# Patient Record
Sex: FEMALE | Race: WHITE | NOT HISPANIC OR LATINO | Employment: FULL TIME | URBAN - METROPOLITAN AREA
[De-identification: names, ages, dates, MRNs, and addresses within clinical notes are randomized per-mention and may not be internally consistent; named-entity substitution may affect disease eponyms.]

---

## 2018-05-17 ENCOUNTER — OFFICE VISIT (OUTPATIENT)
Dept: HEMATOLOGY/ONCOLOGY | Facility: CLINIC | Age: 23
End: 2018-05-17
Payer: MEDICAID

## 2018-05-17 VITALS
HEIGHT: 67 IN | DIASTOLIC BLOOD PRESSURE: 76 MMHG | HEART RATE: 81 BPM | TEMPERATURE: 99 F | WEIGHT: 189.13 LBS | BODY MASS INDEX: 29.69 KG/M2 | SYSTOLIC BLOOD PRESSURE: 123 MMHG

## 2018-05-17 DIAGNOSIS — D50.9 IRON DEFICIENCY ANEMIA, UNSPECIFIED IRON DEFICIENCY ANEMIA TYPE: ICD-10-CM

## 2018-05-17 DIAGNOSIS — E61.1 IRON DEFICIENCY: ICD-10-CM

## 2018-05-17 PROCEDURE — 99203 OFFICE O/P NEW LOW 30 MIN: CPT | Mod: ,,, | Performed by: INTERNAL MEDICINE

## 2018-05-17 RX ORDER — METHYLPREDNISOLONE 4 MG/1
TABLET ORAL
Refills: 0 | COMMUNITY
Start: 2018-05-15 | End: 2018-10-06

## 2018-05-17 RX ORDER — IRON,CARBONYL/ASCORBIC ACID 100-250 MG
1 TABLET ORAL DAILY
Qty: 30 EACH | Refills: 6 | Status: SHIPPED | OUTPATIENT
Start: 2018-05-17 | End: 2020-04-06 | Stop reason: SDUPTHER

## 2018-05-17 NOTE — PROGRESS NOTES
[facility] Hematolgy/Oncology  History & Physical    Subjective:      Patient ID:   NAME: Mena Nichols-Ponthieux : 1995     22 y.o. female    Referring Doc: Diana Parker  Other Physicians: Guicho        Chief Complaint: iron defic      HPI:  22 y.o. female with diagnosis of iron deficiency anemia who has been referred by Yaz for evaluation by medical hematology/oncology. She recently had a baby about 8 months ago. She has had iron issues for several years. She has had anemia issues with both pregnancies. She has some vertigo issues and is on a steroid back. She has fatigue and occasional nausea. Occasional light headedness. No CO, SOB, V. Occasional HA's.              ROS:   GEN: normal without any fever, night sweats or weight loss  HEENT: normal with no HA's, sore throat, stiff neck, changes in vision  CV: normal with no CP, SOB, PND, EDDY or orthopnea  PULM: normal with no SOB, cough, hemoptysis, sputum or pleuritic pain  GI: normal with no abdominal pain, nausea, vomiting, constipation, diarrhea, melanotic stools, BRBPR, or hematemesis  : normal with no hematuria, dysuria  BREAST: normal with no mass, discharge, pain  SKIN: normal with no rash, erythema, bruising, or swelling       Past Medical/Surgical History:  Past Medical History:   Diagnosis Date    Iron deficiency 2018    Iron deficiency anemia 2018     History reviewed. No pertinent surgical history.      Allergies:  Review of patient's allergies indicates:  No Known Allergies    Social/Family History:  Social History     Social History    Marital status: Single     Spouse name: N/A    Number of children: N/A    Years of education: N/A     Occupational History    Not on file.     Social History Main Topics    Smoking status: Never Smoker    Smokeless tobacco: Not on file    Alcohol use No    Drug use: No    Sexual activity: Not Currently     Partners: Male     Other Topics Concern    Not on file     Social  "History Narrative    No narrative on file     History reviewed. No pertinent family history.      Medications:    Current Outpatient Prescriptions:     methylPREDNISolone (MEDROL DOSEPACK) 4 mg tablet, TK UTD, Disp: , Rfl: 0      Pathology:  Cancer Staging  No matching staging information was found for the patient.      Objective:   Vitals:  Blood pressure 123/76, pulse 81, temperature 99.1 °F (37.3 °C), height 5' 7" (1.702 m), weight 85.8 kg (189 lb 1.6 oz).    Physical Examination:   GEN: no apparent distress, comfortable; AAOx3  HEAD: atraumatic and normocephalic  EYES: no pallor, no icterus, PERRLA  ENT: OMM, no pharyngeal erythema, external ears WNL; no nasal discharge; no thrush  NECK: no masses, thyroid normal, trachea midline, no LAD/LN's, supple  CV: RRR with no murmur; normal pulse; normal S1 and S2; no pedal edema  CHEST: Normal respiratory effort; CTAB; normal breath sounds; no wheeze or crackles  ABDOM: nontender and nondistended; soft; normal bowel sounds; no rebound/guarding  MUSC/Skeletal: ROM normal; no crepitus; joints normal; no deformities or arthropathy  EXTREM: no clubbing, cyanosis, inflammation or swelling  SKIN: no rashes, lesions, ulcers, petechiae or subcutaneous nodules  : no guevara  NEURO: grossly intact; motor/sensory WNL; AAOx3; no tremors  PSYCH: normal mood, affect and behavior  LYMPH: normal cervical, supraclavicular, axillary and groin LN's      Labs:   5/15/2018  On chart        Radiology/Diagnostic Studies:          All lab results and imaging results have been reviewed and discussed with the patient    Assessment:   (1)  22 y.o. female with diagnosis of iron deficiency anemia who has been referred by Yaz for evaluation by medical hematology/oncology. She recently had a baby about 8 months ago.   - her hgb is currently wnl at 12.1  - MCV is normal at 84.3  - iron was wnl at 65 and TIBC was normal at 348  - ferritin was low at 12    (2) Vertigo - s/p current " prednisone does pack                Plan:       Iron deficiency anemia, unspecified iron deficiency anemia type    Iron deficiency            PLAN:  1. Try ICAR-C oral iron supplement first  2. Check labs months  3. F/u with PCP and GYN  4. RTC in  3 months  Fax note to Diana Parker NP, Guicho        I have explained and the patient understands all of  the current recommendation(s). I have answered all of their questions to the best of my ability and to their complete satisfaction.             Thank you for allowing me to participate in this patient's care. Please call with any questions or concerns.    Electronically signed Kaz Saldana MD

## 2018-05-17 NOTE — LETTER
May 17, 2018      Diana Parker NP  [address] [address]  [address]  [place] [place] [address]           [facility] Hematology Oncology  [address]  [address]  [place] [address]  Phone: [phone]  Fax: [phone]          Patient: Mena Rascon   MR Number: [id number]   YOB: 1995   Date of Visit: 5/17/2018       Dear Diana Parker:    Thank you for referring Mena Rascon to me for evaluation. Attached you will find relevant portions of my assessment and plan of care.    If you have questions, please do not hesitate to call me. I look forward to following Mena Rascon along with you.    Sincerely,    Kaz Saldana MD    Enclosure  CC:  No Recipients    If you would like to receive this communication electronically, please contact [email or web address] or [phone] to request more information on [facility] Link access.    For providers and/or their staff who would like to refer a patient to [facility], please contact us through our one-stop-shop provider referral line, [facility], at [phone].    If you feel you have received this communication in error or would no longer like to receive these types of communications, please e-mail [email or web address]

## 2018-08-21 ENCOUNTER — OFFICE VISIT (OUTPATIENT)
Dept: HEMATOLOGY/ONCOLOGY | Facility: CLINIC | Age: 23
End: 2018-08-21
Payer: MEDICAID

## 2018-08-21 VITALS
WEIGHT: 190.69 LBS | RESPIRATION RATE: 18 BRPM | SYSTOLIC BLOOD PRESSURE: 118 MMHG | DIASTOLIC BLOOD PRESSURE: 82 MMHG | HEART RATE: 86 BPM | TEMPERATURE: 98 F | BODY MASS INDEX: 29.87 KG/M2

## 2018-08-21 DIAGNOSIS — E61.1 IRON DEFICIENCY: Primary | ICD-10-CM

## 2018-08-21 LAB
% SATURATION: 11 %
ALBUMIN SERPL-MCNC: 4.3 G/DL (ref 3.1–4.7)
ALP SERPL-CCNC: 72 IU/L (ref 40–104)
ALT (SGPT): 13 IU/L (ref 3–33)
AST SERPL-CCNC: 17 IU/L (ref 10–40)
BILIRUB SERPL-MCNC: 0.9 MG/DL (ref 0.3–1)
BUN SERPL-MCNC: 8 MG/DL (ref 8–20)
CALCIUM SERPL-MCNC: 8.9 MG/DL (ref 7.7–10.4)
CHLORIDE: 101 MMOL/L (ref 98–110)
CO2 SERPL-SCNC: 23.8 MMOL/L (ref 22.8–31.6)
CREATININE: 0.76 MG/DL (ref 0.6–1.4)
FERRITIN SERPL-MCNC: 48 NG/ML (ref 24–162)
GLUCOSE: 116 MG/DL (ref 70–99)
IRON: 34 MCG/DL (ref 24–162)
POTASSIUM SERPL-SCNC: 3.4 MMOL/L (ref 3.5–5)
PROT SERPL-MCNC: 8 G/DL (ref 6–8.2)
SODIUM: 135 MMOL/L (ref 134–144)
TOTAL IRON BINDING CAPACITY: 311 MCG/DL (ref 177–435)

## 2018-08-21 PROCEDURE — 99214 OFFICE O/P EST MOD 30 MIN: CPT | Mod: ,,, | Performed by: INTERNAL MEDICINE

## 2018-08-21 NOTE — LETTER
August 21, 2018      Diana Parker NP  [address] [address]  [address]  [facility]  [place] [address]           [facility] - Hematology Oncology  [address]  [address]  [facility] [address]  Phone: [phone]  Fax: [phone]          Patient: Mena Rascon   MR Number: [id number]   YOB: 1995   Date of Visit: 8/21/2018       Dear Diana Parker:    Thank you for referring Mena Rascon to me for evaluation. Attached you will find relevant portions of my assessment and plan of care.    If you have questions, please do not hesitate to call me. I look forward to following Mena Rascon along with you.    Sincerely,    Kaz Saldana MD    Enclosure  CC:  No Recipients    If you would like to receive this communication electronically, please contact [email or web address] or [phone] to request more information on [facility] Link access.    For providers and/or their staff who would like to refer a patient to [facility], please contact us through our one-stop-shop provider referral line, [facility], at [phone].    If you feel you have received this communication in error or would no longer like to receive these types of communications, please e-mail [email or web address]

## 2018-08-21 NOTE — PROGRESS NOTES
[facility] Hematology/Oncology  PROGRESS NOTE - 2nd Follow-up Visit      Subjective:       Patient ID:   NAME: Mena Nichols-Tonythialexander : 1995     22 y.o. female    Referring Doc: Diana Parker  Other Physicians: Guicho    Chief Complaint:  Iron defic anemia f/u    History of Present Illness:     Patient returns today for a 2nd regularly scheduled follow-up visit.  The patient is here today to go over the results of the recently ordered labs, tests and studies. She had repeat labs this am. She is here by herself. She denies any CP, SOB, HA's or N/V. Some mild upper respiratory issues.             ROS:   GEN: normal without any fever, night sweats or weight loss  HEENT: normal with no HA's, sore throat, stiff neck, changes in vision  CV: normal with no CP, SOB, PND, EDDY or orthopnea  PULM: normal with no SOB, cough, hemoptysis, sputum or pleuritic pain  GI: normal with no abdominal pain, nausea, vomiting, constipation, diarrhea, melanotic stools, BRBPR, or hematemesis  : normal with no hematuria, dysuria  BREAST: normal with no mass, discharge, pain  SKIN: normal with no rash, erythema, bruising, or swelling    Allergies:  Review of patient's allergies indicates:  No Known Allergies    Medications:    Current Outpatient Medications:     iron-vitamin C 100-250 mg, ICAR-C, (ICAR-C) 100-250 mg Tab, Take 1 tablet by mouth once daily., Disp: 30 each, Rfl: 6    methylPREDNISolone (MEDROL DOSEPACK) 4 mg tablet, TK UTD, Disp: , Rfl: 0    PMHx/PSHx Updates:  See patient's last visit with me on 2018.  See H&P on 2018        Pathology:  Cancer Staging  No matching staging information was found for the patient.          Objective:     Vitals:  Blood pressure 118/82, pulse 86, temperature 98.1 °F (36.7 °C), resp. rate 18, weight 86.5 kg (190 lb 11.2 oz).    Physical Examination:   GEN: no apparent distress, comfortable; AAOx3  HEAD: atraumatic and normocephalic  EYES: no pallor, no icterus, PERRLA  ENT: OMM, no  pharyngeal erythema, external ears WNL; no nasal discharge; no thrush  NECK: no masses, thyroid normal, trachea midline, no LAD/LN's, supple  CV: RRR with no murmur; normal pulse; normal S1 and S2; no pedal edema  CHEST: Normal respiratory effort; CTAB; normal breath sounds; no wheeze or crackles  ABDOM: nontender and nondistended; soft; normal bowel sounds; no rebound/guarding  MUSC/Skeletal: ROM normal; no crepitus; joints normal; no deformities or arthropathy  EXTREM: no clubbing, cyanosis, inflammation or swelling  SKIN: no rashes, lesions, ulcers, petechiae or subcutaneous nodules  : no guevara  NEURO: grossly intact; motor/sensory WNL; AAOx3; no tremors  PSYCH: normal mood, affect and behavior  LYMPH: normal cervical, supraclavicular, axillary and groin LN's            Labs:     8/3/2018  Lab Results   Component Value Date    WBC 6.0 08/21/2018    HGB 13.2 08/21/2018    HCT 39.8 08/21/2018     08/21/2018       Iron 34 and ferritin 48    Radiology/Diagnostic Studies:    No results found.    I have reviewed all available lab results and radiology reports.    Assessment/Plan:   (1) 22 y.o. female with diagnosis of iron deficiency anemia who has been referred by Yaz for evaluation by medical hematology/oncology. She recently had a baby about 8 months ago.   - her hgb is currently wnl at 13.2 and better  - MCV is normal at 86.7  - iron and ferritin are good now     (2) Vertigo - s/p prednisone in past            Iron deficiency          PLAN:  1. Check labs every three months  2. Continue oral iron for now  3. F/u with PCP and GYN  4. RTC in 6 months  Fax note to , Diana Parker NP, [place]    Discussion:       I spent over 25 mins of time with the patient. Reviewed results of the recently ordered labs, tests and studies; made directives with regards to the results. Over half of this time was spent couseling and coordinating care.    I have explained all of the above in detail and the  patient understands all of the current recommendation(s). I have answered all of their questions to the best of my ability and to their complete satisfaction.   The patient is to continue with the current management plan.            Electronically signed by Kaz Saldana MD

## 2018-10-06 ENCOUNTER — HOSPITAL ENCOUNTER (EMERGENCY)
Facility: HOSPITAL | Age: 23
Discharge: HOME | End: 2018-10-06
Attending: EMERGENCY MEDICINE
Payer: MEDICAID

## 2018-10-06 VITALS
SYSTOLIC BLOOD PRESSURE: 110 MMHG | BODY MASS INDEX: 28.25 KG/M2 | HEART RATE: 81 BPM | TEMPERATURE: 98 F | OXYGEN SATURATION: 98 % | RESPIRATION RATE: 18 BRPM | HEIGHT: 67 IN | DIASTOLIC BLOOD PRESSURE: 80 MMHG | WEIGHT: 180 LBS

## 2018-10-06 DIAGNOSIS — J02.9 VIRAL PHARYNGITIS: Primary | ICD-10-CM

## 2018-10-06 LAB
B-HCG UR QL: NEGATIVE
CTP QC/QA: YES
DEPRECATED S PYO AG THROAT QL EIA: NEGATIVE

## 2018-10-06 PROCEDURE — 87081 CULTURE SCREEN ONLY: CPT

## 2018-10-06 PROCEDURE — 81025 URINE PREGNANCY TEST: CPT | Performed by: EMERGENCY MEDICINE

## 2018-10-06 PROCEDURE — 87880 STREP A ASSAY W/OPTIC: CPT

## 2018-10-06 PROCEDURE — 99282 EMERGENCY DEPT VISIT SF MDM: CPT

## 2018-10-06 NOTE — ED PROVIDER NOTES
"Encounter Date: 10/6/2018    SCRIBE #1 NOTE: I, Michelle Hernandez, am scribing for, and in the presence of, Dr. Colorado .       History     Chief Complaint   Patient presents with    Sore Throat       Time seen by provider: 7:05 AM on 10/06/2018    Mena Ovalle is a 23 y.o. female with a PMHx of amenia who presents to the ED with complaints of sore throat and a productive cough with an onset x 1 day PTA. The patient relays that she has been coughing up mucous and had a sore throat since last night. She is concerned that she may have strep throat. She states that would not be able to see her PCP for a while and wanted to have a strep test done as soon as possible. Patient relays that she has been "kissing on her kids" a lot and wants to make sure that she did not give them strep throat. She denies any relief with the "allergy medication" she took last night. The patient denies fever, nausea, vomiting, or any other symptoms at this time. No pertinent SHx noted.         The history is provided by the patient.     Review of patient's allergies indicates:  No Known Allergies  Past Medical History:   Diagnosis Date    Iron deficiency 5/17/2018    Iron deficiency     Iron deficiency anemia 5/17/2018     History reviewed. No pertinent surgical history.  Family History   Problem Relation Age of Onset    Breast cancer Maternal Grandmother     Lymphoma Maternal Grandmother     Cervical cancer Maternal Grandmother      Social History     Tobacco Use    Smoking status: Never Smoker    Smokeless tobacco: Never Used   Substance Use Topics    Alcohol use: No    Drug use: No     Review of Systems   Constitutional: Negative for activity change, appetite change, chills, fatigue and fever.   HENT: Positive for sore throat. Negative for congestion, ear pain and rhinorrhea.    Eyes: Negative for visual disturbance.   Respiratory: Positive for cough. Negative for apnea and shortness of breath.    Cardiovascular: Negative for " chest pain and palpitations.   Gastrointestinal: Negative for abdominal distention, abdominal pain, diarrhea, nausea and vomiting.   Genitourinary: Negative for difficulty urinating.   Musculoskeletal: Negative for neck pain.   Skin: Negative for pallor and rash.   Neurological: Negative for headaches.   Hematological: Does not bruise/bleed easily.   Psychiatric/Behavioral: Negative for agitation.       Physical Exam     Initial Vitals [10/06/18 0701]   BP Pulse Resp Temp SpO2   110/80 81 18 98.2 °F (36.8 °C) 98 %      MAP       --         Physical Exam    Nursing note and vitals reviewed.  Constitutional: She appears well-developed and well-nourished. She is not diaphoretic. No distress.   HENT:   Head: Normocephalic and atraumatic.   Mouth/Throat: Uvula is midline and mucous membranes are normal. Posterior oropharyngeal erythema (mild ) present. No oropharyngeal exudate, posterior oropharyngeal edema or tonsillar abscesses.   Eyes: EOM are normal. Pupils are equal, round, and reactive to light.   Neck: Normal range of motion. Neck supple.   Cardiovascular: Normal rate, regular rhythm, normal heart sounds and intact distal pulses. Exam reveals no gallop and no friction rub.    No murmur heard.  Pulmonary/Chest: Breath sounds normal. No respiratory distress. She has no wheezes. She has no rhonchi. She has no rales.   Abdominal: Soft. Bowel sounds are normal. There is no tenderness. There is no rebound and no guarding.   Musculoskeletal: Normal range of motion.   Neurological: She is alert and oriented to person, place, and time.   Skin: Skin is warm and dry.   Psychiatric: She has a normal mood and affect. Her behavior is normal. Judgment and thought content normal.         ED Course   Procedures  Labs Reviewed   THROAT SCREEN, RAPID   CULTURE, STREP A,  THROAT   POCT URINE PREGNANCY          Imaging Results    None          Medical Decision Making:   History:   Old Medical Records: I decided to obtain old medical  records.  Initial Assessment:   23-year-old female presented with a chief complaint of a sore throat.  Differential Diagnosis:   Initial differential diagnosis included but not limited to peritonsillar abscess, pharyngitis, upper respiratory infection.  Clinical Tests:   Lab Tests: Ordered and Reviewed  ED Management:  The patient was urgently evaluated in the emergency department, her evaluation was significant for a young female mild redness in the back of her throat.  The patient's rapid strep screen was noted to be negative.  The patient likely has a pharyngitis from an upper respiratory infection.  She is stable for discharge to home.  She is to take over-the-counter medications as needed for symptomatic relief and she is to follow her PCP for further care.            Scribe Attestation:   Scribe #1: I performed the above scribed service and the documentation accurately describes the services I performed. I attest to the accuracy of the note.           I, Dr. Washington Colorado, personally performed the services described in this documentation. All medical record entries made by the scribe were at my direction and in my presence.  I have reviewed the chart and agree that the record reflects my personal performance and is accurate and complete. Washington Colorado MD.  7:34 AM 10/06/2018       Clinical Impression:   The encounter diagnosis was Viral pharyngitis.      Disposition:   Disposition: Discharged  Condition: Stable                        Washington Colorado MD  10/06/18 0736

## 2018-10-08 LAB — BACTERIA THROAT CULT: NORMAL

## 2018-12-13 DIAGNOSIS — Z83.49 FAMILY HISTORY OF MTHFR DEFICIENCY: Primary | ICD-10-CM

## 2019-03-04 ENCOUNTER — HOSPITAL ENCOUNTER (EMERGENCY)
Facility: HOSPITAL | Age: 24
Discharge: HOME | End: 2019-03-04
Attending: EMERGENCY MEDICINE
Payer: MEDICAID

## 2019-03-04 VITALS
HEIGHT: 67 IN | RESPIRATION RATE: 16 BRPM | SYSTOLIC BLOOD PRESSURE: 139 MMHG | HEART RATE: 89 BPM | DIASTOLIC BLOOD PRESSURE: 84 MMHG | TEMPERATURE: 99 F | OXYGEN SATURATION: 98 % | WEIGHT: 190 LBS | BODY MASS INDEX: 29.82 KG/M2

## 2019-03-04 DIAGNOSIS — L08.9 INFECTED BITE WOUND OF FINGER, INITIAL ENCOUNTER: Primary | ICD-10-CM

## 2019-03-04 DIAGNOSIS — S61.259A INFECTED BITE WOUND OF FINGER, INITIAL ENCOUNTER: Primary | ICD-10-CM

## 2019-03-04 PROCEDURE — 99283 EMERGENCY DEPT VISIT LOW MDM: CPT

## 2019-03-04 PROCEDURE — 25000003 PHARM REV CODE 250: Performed by: EMERGENCY MEDICINE

## 2019-03-04 RX ORDER — MUPIROCIN 20 MG/G
1 OINTMENT TOPICAL
Status: COMPLETED | OUTPATIENT
Start: 2019-03-04 | End: 2019-03-04

## 2019-03-04 RX ORDER — AMOXICILLIN 500 MG/1
500 CAPSULE ORAL
COMMUNITY
End: 2019-03-06

## 2019-03-04 RX ADMIN — MUPIROCIN 22 G: 20 OINTMENT TOPICAL at 08:03

## 2019-03-04 NOTE — ED PROVIDER NOTES
"Encounter Date: 3/4/2019    SCRIBE #1 NOTE: I, Val Eller, am scribing for, and in the presence of, Washington Colorado MD.       History     Chief Complaint   Patient presents with    Wound Check     right finger       Time seen by provider: 8:43 AM on 03/04/2019    Mena Ovalle is a 23 y.o. female who presents to the ED with an onset of an area of swelling on her right third finger starting 4 days ago. She states the wound started as a "pimple" and then became swollen and itchy this morning. The patient has been using Bactroban. She denies history of diabetes, fever, or any other symptoms at this time. Patient has a history of anemia. No pertinent SHx noted. No known drug allergies noted.       The history is provided by the patient.     Review of patient's allergies indicates:  No Known Allergies  Past Medical History:   Diagnosis Date    Iron deficiency 5/17/2018    Iron deficiency     Iron deficiency anemia 5/17/2018     No past surgical history on file.  Family History   Problem Relation Age of Onset    Breast cancer Maternal Grandmother     Lymphoma Maternal Grandmother     Cervical cancer Maternal Grandmother      Social History     Tobacco Use    Smoking status: Never Smoker    Smokeless tobacco: Never Used   Substance Use Topics    Alcohol use: No    Drug use: No     Review of Systems   Constitutional: Negative for chills and fever. Diaphoresis: resolved.   HENT: Negative for nosebleeds.    Eyes: Negative for visual disturbance.   Respiratory: Negative for cough and shortness of breath.    Cardiovascular: Negative for chest pain and palpitations.   Gastrointestinal: Negative for abdominal pain, diarrhea, nausea and vomiting.   Genitourinary: Negative for dysuria and hematuria.   Musculoskeletal: Negative for back pain and neck pain.   Skin: Negative for rash.        Positive for pustule.    Neurological: Negative for seizures, syncope and headaches.     Physical Exam     Initial Vitals " [03/04/19 0835]   BP Pulse Resp Temp SpO2   139/84 89 16 98.9 °F (37.2 °C) 98 %      MAP       --         Physical Exam    Nursing note and vitals reviewed.  Constitutional: She appears well-developed and well-nourished. She is not diaphoretic. No distress.   HENT:   Head: Normocephalic and atraumatic.   Eyes: EOM are normal. Pupils are equal, round, and reactive to light.   Neck: Normal range of motion. Neck supple.   Cardiovascular: Normal rate, regular rhythm, normal heart sounds and intact distal pulses. Exam reveals no gallop and no friction rub.    No murmur heard.  Pulmonary/Chest: Breath sounds normal. No respiratory distress. She has no wheezes. She has no rhonchi. She has no rales.   Musculoskeletal: Normal range of motion.   Pustule noted to the mid aspect of the right 3rd finger with surrounding redness.    Neurological: She is alert and oriented to person, place, and time.   Skin: Skin is warm and dry. There is erythema.   Psychiatric: She has a normal mood and affect. Her behavior is normal. Judgment and thought content normal.       ED Course   Procedures  Labs Reviewed - No data to display     Imaging Results    None          Medical Decision Making:   History:   Old Medical Records: I decided to obtain old medical records.  Initial Assessment:   23-year-old female presented with a chief complaint of a wound.  Differential Diagnosis:   Initial differential diagnosis included but not limited to abscess, infected insect bite, and cellulitis.  ED Management:  The patient was urgently evaluated in the emergency department, her evaluation was significant for a young female with a likely infected insect bite to the finger.  The patient does have a small amount of redness noted.  I did open the patient's insect bite, which looked like a small pustule with a needle, and patient tolerated the procedure well. There was no packing needed.  The procedure was very simple.  The patient did get localized wound care  after this and did get Bactroban ointment apply to the site.  She is stable for discharge home.  She will be discharged home on Bactroban ointment and she is to follow up with her PCP for further care.            Scribe Attestation:   Scribe #1: I performed the above scribed service and the documentation accurately describes the services I performed. I attest to the accuracy of the note.        I, Dr. Washington Colorado, personally performed the services described in this documentation. All medical record entries made by the scribe were at my direction and in my presence.  I have reviewed the chart and agree that the record reflects my personal performance and is accurate and complete. Washington Colorado MD.  5:44 PM 03/04/2019          Clinical Impression:       ICD-10-CM ICD-9-CM   1. Infected bite wound of finger, initial encounter S61.259A 883.1    L08.9          Disposition:   Disposition: Discharged  Condition: Stable                        Washington Colorado MD  03/04/19 6294

## 2019-03-06 ENCOUNTER — HOSPITAL ENCOUNTER (EMERGENCY)
Facility: HOSPITAL | Age: 24
Discharge: HOME | End: 2019-03-06
Attending: EMERGENCY MEDICINE
Payer: COMMERCIAL

## 2019-03-06 VITALS
RESPIRATION RATE: 16 BRPM | SYSTOLIC BLOOD PRESSURE: 112 MMHG | WEIGHT: 190 LBS | OXYGEN SATURATION: 99 % | TEMPERATURE: 99 F | HEART RATE: 87 BPM | BODY MASS INDEX: 29.76 KG/M2 | DIASTOLIC BLOOD PRESSURE: 76 MMHG

## 2019-03-06 DIAGNOSIS — L02.511 ABSCESS OF FINGER OF RIGHT HAND: Primary | ICD-10-CM

## 2019-03-06 LAB
B-HCG UR QL: NEGATIVE
CTP QC/QA: YES

## 2019-03-06 PROCEDURE — 10060 I&D ABSCESS SIMPLE/SINGLE: CPT

## 2019-03-06 PROCEDURE — 25000003 PHARM REV CODE 250: Performed by: PHYSICIAN ASSISTANT

## 2019-03-06 PROCEDURE — 81025 URINE PREGNANCY TEST: CPT | Performed by: PHYSICIAN ASSISTANT

## 2019-03-06 PROCEDURE — 26010 DRAINAGE OF FINGER ABSCESS: CPT

## 2019-03-06 PROCEDURE — 99283 EMERGENCY DEPT VISIT LOW MDM: CPT | Mod: 25

## 2019-03-06 RX ORDER — LIDOCAINE HYDROCHLORIDE 10 MG/ML
10 INJECTION INFILTRATION; PERINEURAL
Status: COMPLETED | OUTPATIENT
Start: 2019-03-06 | End: 2019-03-06

## 2019-03-06 RX ORDER — MUPIROCIN 20 MG/G
OINTMENT TOPICAL 3 TIMES DAILY
COMMUNITY
End: 2019-07-29

## 2019-03-06 RX ORDER — SULFAMETHOXAZOLE AND TRIMETHOPRIM 800; 160 MG/1; MG/1
1 TABLET ORAL 2 TIMES DAILY
Qty: 14 TABLET | Refills: 0 | Status: SHIPPED | OUTPATIENT
Start: 2019-03-06 | End: 2019-03-13

## 2019-03-06 RX ORDER — SULFAMETHOXAZOLE AND TRIMETHOPRIM 800; 160 MG/1; MG/1
1 TABLET ORAL
Status: COMPLETED | OUTPATIENT
Start: 2019-03-06 | End: 2019-03-06

## 2019-03-06 RX ADMIN — LIDOCAINE HYDROCHLORIDE 10 ML: 10 INJECTION, SOLUTION INFILTRATION; PERINEURAL at 09:03

## 2019-03-06 RX ADMIN — SULFAMETHOXAZOLE AND TRIMETHOPRIM 1 TABLET: 800; 160 TABLET ORAL at 10:03

## 2019-03-06 NOTE — DISCHARGE INSTRUCTIONS
Take tylenol or motrin as needed.  Continue antibiotics as prescribed.  Follow up with your primary care provider.  Keep wound clean and dry.  For worsening symptoms, chest pain, shortness of breath, increased abdominal pain, high grade fever, stroke or stroke like symptoms, immediately go to the nearest Emergency Room or call 911 as soon as possible.

## 2019-05-14 ENCOUNTER — TELEPHONE (OUTPATIENT)
Dept: OPHTHALMOLOGY | Facility: CLINIC | Age: 24
End: 2019-05-14

## 2019-05-14 NOTE — TELEPHONE ENCOUNTER
----- Message from Cass Mccurdy sent at 5/14/2019 11:29 AM CDT -----  Contact: Patient ([employer] [facility]) ph#[phone]  Patient ([facility] employee) ph#[phone] requesting an eye exam on 5/24/19.  Please call, to notify if she can be worked in.  Reason: red eye and need glasses  Patient has eye med insurance

## 2019-05-27 ENCOUNTER — TELEPHONE (OUTPATIENT)
Dept: OPHTHALMOLOGY | Facility: CLINIC | Age: 24
End: 2019-05-27

## 2019-05-27 NOTE — TELEPHONE ENCOUNTER
----- Message from Jorge Morgan sent at 5/27/2019  8:09 AM CDT -----  Contact: Patient  Type: Needs Medical Advice    Who Called:  Patient  Best Call Back Number: [phone] (ask for Mena)  Additional Information: Patient would like to reschedule missed appointment. Please call to advise. Thanks!

## 2019-06-06 ENCOUNTER — TELEPHONE (OUTPATIENT)
Dept: HEMATOLOGY/ONCOLOGY | Facility: CLINIC | Age: 24
End: 2019-06-06

## 2019-06-06 DIAGNOSIS — D50.9 IRON DEFICIENCY ANEMIA, UNSPECIFIED IRON DEFICIENCY ANEMIA TYPE: ICD-10-CM

## 2019-06-06 DIAGNOSIS — E61.1 IRON DEFICIENCY: Primary | ICD-10-CM

## 2019-06-07 ENCOUNTER — TELEPHONE (OUTPATIENT)
Dept: HEMATOLOGY/ONCOLOGY | Facility: CLINIC | Age: 24
End: 2019-06-07

## 2019-06-21 ENCOUNTER — OFFICE VISIT (OUTPATIENT)
Dept: OPTOMETRY | Facility: CLINIC | Age: 24
End: 2019-06-21
Payer: COMMERCIAL

## 2019-06-21 DIAGNOSIS — Z01.00 EXAMINATION OF EYES AND VISION: Primary | ICD-10-CM

## 2019-06-21 DIAGNOSIS — H52.7 REFRACTIVE ERROR: ICD-10-CM

## 2019-06-21 DIAGNOSIS — H04.123 DRY EYE SYNDROME, BILATERAL: ICD-10-CM

## 2019-06-21 PROCEDURE — 92004 COMPRE OPH EXAM NEW PT 1/>: CPT | Mod: S$GLB,,, | Performed by: OPTOMETRIST

## 2019-06-21 PROCEDURE — 99999 PR PBB SHADOW E&M-EST. PATIENT-LVL II: CPT | Mod: PBBFAC,,, | Performed by: OPTOMETRIST

## 2019-06-21 PROCEDURE — 92004 PR EYE EXAM, NEW PATIENT,COMPREHESV: ICD-10-PCS | Mod: S$GLB,,, | Performed by: OPTOMETRIST

## 2019-06-21 PROCEDURE — 92015 DETERMINE REFRACTIVE STATE: CPT | Mod: S$GLB,,, | Performed by: OPTOMETRIST

## 2019-06-21 PROCEDURE — 99999 PR PBB SHADOW E&M-EST. PATIENT-LVL II: ICD-10-PCS | Mod: PBBFAC,,, | Performed by: OPTOMETRIST

## 2019-06-21 PROCEDURE — 92015 PR REFRACTION: ICD-10-PCS | Mod: S$GLB,,, | Performed by: OPTOMETRIST

## 2019-06-21 NOTE — PROGRESS NOTES
"HPI     HPI    Pt here today for yearly eye exam and blurred vision.   (+) Pt states eyes gets red occasionally. One eye at a time. Pt states   eyes have been getting red over the last few years. Pt states she uses   "clear eye drops" for rendded and only work 30 minutes to an hour and   redness comes back. Pt states no crust.       Would patient like a refraction today?Yes, Pt states used to wear glasses.   Vision has been blurry near and far.      (-)drops  (-)flashes  (-)floaters  (-)diplopia     (-)Diabetes     OCULAR HISTORY  Last Eye Exam: 5 years  (-)eye surgery              Last edited by Jose Gates, OD on 6/21/2019  9:19 AM. (History)            Assessment /Plan     For exam results, see Encounter Report.    Examination of eyes and vision    Refractive error    Dry eye syndrome, bilateral      Mild ZAFAR OU. Discussed ocular affects of dry eyes. Recommend OTC systane gel artificial tears twice daily, caution transient blurring of vision with gel use. Discussed chronicity of ZAFAR. RTC if symptoms not alleviated by continued use of artificial tears. D/C clear eyes drops, substitute with lumify as needed, use sparingly.     Dispensed updated spectacle Rx. Discussed various spectacle lens options. Discussed adaptation period to new specs.  Demonstrated new spec Rx vs uncorrected vision in phoropter with patient satisfaction.      RTC in 1  year for comprehensive eye exam, or sooner prn.                        "

## 2019-07-26 ENCOUNTER — TELEPHONE (OUTPATIENT)
Dept: HEMATOLOGY/ONCOLOGY | Facility: CLINIC | Age: 24
End: 2019-07-26

## 2019-07-26 DIAGNOSIS — Z83.49 FAMILY HISTORY OF MTHFR DEFICIENCY: ICD-10-CM

## 2019-07-26 DIAGNOSIS — E61.1 IRON DEFICIENCY: Primary | ICD-10-CM

## 2019-07-26 DIAGNOSIS — D50.9 IRON DEFICIENCY ANEMIA, UNSPECIFIED IRON DEFICIENCY ANEMIA TYPE: ICD-10-CM

## 2019-07-26 NOTE — TELEPHONE ENCOUNTER
Called to see if the pt had any labs done prior to f/u appt.     for the labs to be done @ [facility]

## 2019-07-29 ENCOUNTER — OFFICE VISIT (OUTPATIENT)
Dept: HEMATOLOGY/ONCOLOGY | Facility: CLINIC | Age: 24
End: 2019-07-29
Payer: MEDICAID

## 2019-07-29 ENCOUNTER — LAB VISIT (OUTPATIENT)
Dept: LAB | Facility: HOSPITAL | Age: 24
End: 2019-07-29
Attending: INTERNAL MEDICINE
Payer: MEDICAID

## 2019-07-29 VITALS
HEART RATE: 75 BPM | SYSTOLIC BLOOD PRESSURE: 122 MMHG | DIASTOLIC BLOOD PRESSURE: 82 MMHG | WEIGHT: 202.19 LBS | BODY MASS INDEX: 31.67 KG/M2 | RESPIRATION RATE: 20 BRPM | TEMPERATURE: 98 F

## 2019-07-29 DIAGNOSIS — E61.1 IRON DEFICIENCY: Primary | ICD-10-CM

## 2019-07-29 DIAGNOSIS — D50.9 IRON DEFICIENCY ANEMIA, UNSPECIFIED IRON DEFICIENCY ANEMIA TYPE: ICD-10-CM

## 2019-07-29 DIAGNOSIS — E61.1 IRON DEFICIENCY: ICD-10-CM

## 2019-07-29 DIAGNOSIS — Z83.49 FAMILY HISTORY OF MTHFR DEFICIENCY: ICD-10-CM

## 2019-07-29 LAB
ALBUMIN SERPL BCP-MCNC: 4 G/DL (ref 3.5–5.2)
ALP SERPL-CCNC: 65 U/L (ref 55–135)
ALT SERPL W/O P-5'-P-CCNC: 26 U/L (ref 10–44)
ANION GAP SERPL CALC-SCNC: 9 MMOL/L (ref 8–16)
AST SERPL-CCNC: 18 U/L (ref 10–40)
BASOPHILS # BLD AUTO: 0.04 K/UL (ref 0–0.2)
BASOPHILS NFR BLD: 0.6 % (ref 0–1.9)
BILIRUB SERPL-MCNC: 0.4 MG/DL (ref 0.1–1)
BUN SERPL-MCNC: 12 MG/DL (ref 6–20)
CALCIUM SERPL-MCNC: 9.3 MG/DL (ref 8.7–10.5)
CHLORIDE SERPL-SCNC: 105 MMOL/L (ref 95–110)
CO2 SERPL-SCNC: 25 MMOL/L (ref 23–29)
CREAT SERPL-MCNC: 0.8 MG/DL (ref 0.5–1.4)
DIFFERENTIAL METHOD: ABNORMAL
EOSINOPHIL # BLD AUTO: 0.1 K/UL (ref 0–0.5)
EOSINOPHIL NFR BLD: 1.4 % (ref 0–8)
ERYTHROCYTE [DISTWIDTH] IN BLOOD BY AUTOMATED COUNT: 12.8 % (ref 11.5–14.5)
EST. GFR  (AFRICAN AMERICAN): >60 ML/MIN/1.73 M^2
EST. GFR  (NON AFRICAN AMERICAN): >60 ML/MIN/1.73 M^2
GLUCOSE SERPL-MCNC: 106 MG/DL (ref 70–110)
HCT VFR BLD AUTO: 38.5 % (ref 37–48.5)
HGB BLD-MCNC: 12.2 G/DL (ref 12–16)
IMM GRANULOCYTES # BLD AUTO: 0.01 K/UL (ref 0–0.04)
LYMPHOCYTES # BLD AUTO: 2.3 K/UL (ref 1–4.8)
LYMPHOCYTES NFR BLD: 35 % (ref 18–48)
MCH RBC QN AUTO: 27.2 PG (ref 27–31)
MCHC RBC AUTO-ENTMCNC: 31.7 G/DL (ref 32–36)
MCV RBC AUTO: 86 FL (ref 82–98)
MONOCYTES # BLD AUTO: 0.4 K/UL (ref 0.3–1)
MONOCYTES NFR BLD: 6.7 % (ref 4–15)
NEUTROPHILS # BLD AUTO: 3.6 K/UL (ref 1.8–7.7)
NEUTROPHILS NFR BLD: 56.1 % (ref 38–73)
NRBC BLD-RTO: 0 /100 WBC
PLATELET # BLD AUTO: 306 K/UL (ref 150–350)
PMV BLD AUTO: 10.4 FL (ref 9.2–12.9)
POTASSIUM SERPL-SCNC: 4 MMOL/L (ref 3.5–5.1)
PROT SERPL-MCNC: 7.5 G/DL (ref 6–8.4)
RBC # BLD AUTO: 4.49 M/UL (ref 4–5.4)
SODIUM SERPL-SCNC: 139 MMOL/L (ref 136–145)
WBC # BLD AUTO: 6.43 K/UL (ref 3.9–12.7)

## 2019-07-29 PROCEDURE — 80053 COMPREHEN METABOLIC PANEL: CPT

## 2019-07-29 PROCEDURE — 99213 PR OFFICE/OUTPT VISIT, EST, LEVL III, 20-29 MIN: ICD-10-PCS | Mod: S$GLB,,, | Performed by: INTERNAL MEDICINE

## 2019-07-29 PROCEDURE — 99213 OFFICE O/P EST LOW 20 MIN: CPT | Mod: S$GLB,,, | Performed by: INTERNAL MEDICINE

## 2019-07-29 PROCEDURE — 36415 COLL VENOUS BLD VENIPUNCTURE: CPT

## 2019-07-29 PROCEDURE — 85025 COMPLETE CBC W/AUTO DIFF WBC: CPT

## 2019-07-29 NOTE — PROGRESS NOTES
[facility]  PROGRESS NOTE -   Follow-up Visit      Subjective:       Patient ID:   NAME: Mena Ovalle : 1995     23 y.o. female    Referring Doc: Diana Parker  Other Physicians: [place]    Chief Complaint:  Iron defic anemia f/u    History of Present Illness:     Patient returns today for a  regularly scheduled follow-up visit.  The patient is here today to go over the results of the recently ordered labs, tests and studies. She had repeat labs this am. She is here by herself. She denies any CP, SOB, HA's or N/V.          ROS:   GEN: normal without any fever, night sweats or weight loss  HEENT: normal with no HA's, sore throat, stiff neck, changes in vision  CV: normal with no CP, SOB, PND, EDDY or orthopnea  PULM: normal with no SOB, cough, hemoptysis, sputum or pleuritic pain  GI: normal with no abdominal pain, nausea, vomiting, constipation, diarrhea, melanotic stools, BRBPR, or hematemesis  : normal with no hematuria, dysuria  BREAST: normal with no mass, discharge, pain  SKIN: normal with no rash, erythema, bruising, or swelling    Allergies:  Review of patient's allergies indicates:  No Known Allergies    Medications:    Current Outpatient Medications:     iron-vitamin C 100-250 mg, ICAR-C, (ICAR-C) 100-250 mg Tab, Take 1 tablet by mouth once daily., Disp: 30 each, Rfl: 6    PMHx/PSHx Updates:  See patient's last visit with me on 2018.  See H&P on 2018        Pathology:  Cancer Staging  No matching staging information was found for the patient.          Objective:     Vitals:  Blood pressure 122/82, pulse 75, temperature 98.1 °F (36.7 °C), temperature source Oral, resp. rate 20, weight 91.7 kg (202 lb 3.2 oz).    Physical Examination:   GEN: no apparent distress, comfortable; AAOx3  HEAD: atraumatic and normocephalic  EYES: no pallor, no icterus, PERRLA  ENT: OMM, no pharyngeal erythema, external ears WNL; no nasal discharge; no thrush  NECK: no masses, thyroid  normal, trachea midline, no LAD/LN's, supple  CV: RRR with no murmur; normal pulse; normal S1 and S2; no pedal edema  CHEST: Normal respiratory effort; CTAB; normal breath sounds; no wheeze or crackles  ABDOM: nontender and nondistended; soft; normal bowel sounds; no rebound/guarding  MUSC/Skeletal: ROM normal; no crepitus; joints normal; no deformities or arthropathy  EXTREM: no clubbing, cyanosis, inflammation or swelling  SKIN: no rashes, lesions, ulcers, petechiae or subcutaneous nodules  : no guevara  NEURO: grossly intact; motor/sensory WNL; AAOx3; no tremors  PSYCH: normal mood, affect and behavior  LYMPH: normal cervical, supraclavicular, axillary and groin LN's            Labs:     7/29/2019  Lab Results   Component Value Date    WBC 6.43 07/29/2019    HGB 12.2 07/29/2019    HCT 38.5 07/29/2019    MCV 86 07/29/2019     07/29/2019     CMP  Sodium   Date Value Ref Range Status   07/29/2019 139 136 - 145 mmol/L Final   08/21/2018 135 134 - 144 mmol/L      Potassium   Date Value Ref Range Status   07/29/2019 4.0 3.5 - 5.1 mmol/L Final     Chloride   Date Value Ref Range Status   07/29/2019 105 95 - 110 mmol/L Final   08/21/2018 101 98 - 110 mmol/L      CO2   Date Value Ref Range Status   07/29/2019 25 23 - 29 mmol/L Final     Glucose   Date Value Ref Range Status   07/29/2019 106 70 - 110 mg/dL Final   08/21/2018 116 (H) 70 - 99 mg/dL      BUN, Bld   Date Value Ref Range Status   07/29/2019 12 6 - 20 mg/dL Final     Creatinine   Date Value Ref Range Status   07/29/2019 0.8 0.5 - 1.4 mg/dL Final   08/21/2018 0.76 0.60 - 1.40 mg/dL      Calcium   Date Value Ref Range Status   07/29/2019 9.3 8.7 - 10.5 mg/dL Final     Total Protein   Date Value Ref Range Status   07/29/2019 7.5 6.0 - 8.4 g/dL Final     Albumin   Date Value Ref Range Status   07/29/2019 4.0 3.5 - 5.2 g/dL Final   08/21/2018 4.3 3.1 - 4.7 g/dL      Total Bilirubin   Date Value Ref Range Status   07/29/2019 0.4 0.1 - 1.0 mg/dL Final      Comment:     For infants and newborns, interpretation of results should be based  on gestational age, weight and in agreement with clinical  observations.  Premature Infant recommended reference ranges:  Up to 24 hours.............<8.0 mg/dL  Up to 48 hours............<12.0 mg/dL  3-5 days..................<15.0 mg/dL  6-29 days.................<15.0 mg/dL       Alkaline Phosphatase   Date Value Ref Range Status   07/29/2019 65 55 - 135 U/L Final     AST   Date Value Ref Range Status   07/29/2019 18 10 - 40 U/L Final     ALT   Date Value Ref Range Status   07/29/2019 26 10 - 44 U/L Final     Anion Gap   Date Value Ref Range Status   07/29/2019 9 8 - 16 mmol/L Final     eGFR if    Date Value Ref Range Status   07/29/2019 >60 >60 mL/min/1.73 m^2 Final     eGFR if non    Date Value Ref Range Status   07/29/2019 >60 >60 mL/min/1.73 m^2 Final     Comment:     Calculation used to obtain the estimated glomerular filtration  rate (eGFR) is the CKD-EPI equation.              Radiology/Diagnostic Studies:    No results found.    I have reviewed all available lab results and radiology reports.    Assessment/Plan:   (1) 23 y.o. female with diagnosis of iron deficiency anemia who has been referred by Yaz for evaluation by medical hematology/oncology. She recently had a baby about 8 months ago.   - her hgb is currently wnl at 12.2  - MCV is normal at 86.0  - iron and ferritin were good previously     (2) Vertigo - s/p prednisone in past            Iron deficiency          PLAN:  1. Check labs every six months; repeat order MTHFR  2. Continue oral iron for now  3. F/u with PCP and GYN  4. RTC in 6 months  Fax note to , Diana Parker NP, [place]    Discussion:       I spent over 25 mins of time with the patient. Reviewed results of the recently ordered labs, tests and studies; made directives with regards to the results. Over half of this time was spent couseling and coordinating  care.    I have explained all of the above in detail and the patient understands all of the current recommendation(s). I have answered all of their questions to the best of my ability and to their complete satisfaction.   The patient is to continue with the current management plan.            Electronically signed by Kaz Saldana MD

## 2019-12-23 ENCOUNTER — OFFICE VISIT (OUTPATIENT)
Dept: OBSTETRICS AND GYNECOLOGY | Facility: CLINIC | Age: 24
End: 2019-12-23
Payer: COMMERCIAL

## 2019-12-23 VITALS
SYSTOLIC BLOOD PRESSURE: 102 MMHG | DIASTOLIC BLOOD PRESSURE: 70 MMHG | HEIGHT: 67 IN | BODY MASS INDEX: 31.11 KG/M2 | WEIGHT: 198.19 LBS

## 2019-12-23 DIAGNOSIS — Z82.49 FAMILY HISTORY OF PULMONARY EMBOLISM: ICD-10-CM

## 2019-12-23 DIAGNOSIS — Z30.9 ENCOUNTER FOR CONTRACEPTIVE MANAGEMENT, UNSPECIFIED TYPE: ICD-10-CM

## 2019-12-23 DIAGNOSIS — Z01.419 GYNECOLOGIC EXAM NORMAL: Primary | ICD-10-CM

## 2019-12-23 PROCEDURE — 88175 CYTOPATH C/V AUTO FLUID REDO: CPT

## 2019-12-23 PROCEDURE — 99999 PR PBB SHADOW E&M-EST. PATIENT-LVL III: ICD-10-PCS | Mod: PBBFAC,,, | Performed by: OBSTETRICS & GYNECOLOGY

## 2019-12-23 PROCEDURE — 99999 PR PBB SHADOW E&M-EST. PATIENT-LVL III: CPT | Mod: PBBFAC,,, | Performed by: OBSTETRICS & GYNECOLOGY

## 2019-12-23 PROCEDURE — 99385 PREV VISIT NEW AGE 18-39: CPT | Mod: S$GLB,,, | Performed by: OBSTETRICS & GYNECOLOGY

## 2019-12-23 PROCEDURE — 99385 PR PREVENTIVE VISIT,NEW,18-39: ICD-10-PCS | Mod: S$GLB,,, | Performed by: OBSTETRICS & GYNECOLOGY

## 2019-12-23 NOTE — PROGRESS NOTES
Chief Complaint   Patient presents with    [facility]    Well Woman    Contraception       History of Present Illness: Mena Ovalle is a 24 y.o. female that presents today 12/23/2019 for well gyn visit.  Used mirena until 7/19 removed.  She reports last couple years periods are heavy and very light on mirena.      Past Medical History:   Diagnosis Date    Abnormal Pap smear of cervix     repeat pap    Family history of pulmonary embolism     MOTHER with MTHFR, PE found after knee replacement.     Iron deficiency 5/17/2018    Iron deficiency     Iron deficiency anemia 5/17/2018       Past Surgical History:   Procedure Laterality Date    WISDOM TOOTH EXTRACTION  11/15/2019       Current Outpatient Medications   Medication Sig Dispense Refill    iron-vitamin C 100-250 mg, ICAR-C, (ICAR-C) 100-250 mg Tab Take 1 tablet by mouth once daily. 30 each 6     No current facility-administered medications for this visit.        Review of patient's allergies indicates:  No Known Allergies    Family History   Problem Relation Age of Onset    Breast cancer Maternal Grandmother     Lymphoma Maternal Grandmother     Cervical cancer Maternal Grandmother     Pulmonary embolism Mother         MTHFR    Glaucoma Neg Hx     Retinal detachment Neg Hx     Macular degeneration Neg Hx     Ovarian cancer Neg Hx        Social History     Socioeconomic History    Marital status:      Spouse name: Not on file    Number of children: Not on file    Years of education: Not on file    Highest education level: Not on file   Occupational History    Not on file   Social Needs    Financial resource strain: Not on file    Food insecurity:     Worry: Not on file     Inability: Not on file    Transportation needs:     Medical: Not on file     Non-medical: Not on file   Tobacco Use    Smoking status: Never Smoker    Smokeless tobacco: Never Used   Substance and Sexual Activity    Alcohol use: No    Drug use: No  "   Sexual activity: Not Currently     Partners: Male   Lifestyle    Physical activity:     Days per week: Not on file     Minutes per session: Not on file    Stress: Not on file   Relationships    Social connections:     Talks on phone: Not on file     Gets together: Not on file     Attends Denominational service: Not on file     Active member of club or organization: Not on file     Attends meetings of clubs or organizations: Not on file     Relationship status: Not on file   Other Topics Concern    Not on file   Social History Narrative    Not on file       OB History    Para Term  AB Living   3 2 2   1     SAB TAB Ectopic Multiple Live Births   1              # Outcome Date GA Lbr Trace/2nd Weight Sex Delivery Anes PTL Lv   3 2014 6w0d          2 Term      Vag-Spont      1 Term      Vag-Spont          Review of Symptoms:  GENERAL: Denies weight gain or weight loss. Feeling well overall.   SKIN: Denies rash or lesions.   HEAD: Denies head injury or headache.   NODES: Denies enlarged lymph nodes.   CHEST: Denies chest pain or shortness of breath.   CARDIOVASCULAR: Denies palpitations or left sided chest pain.   ABDOMEN: No abdominal pain, constipation, diarrhea, nausea, vomiting or rectal bleeding.   URINARY: No frequency, dysuria, hematuria, or burning on urination.  HEMATOLOGIC: No easy bruisability or excessive bleeding.   MUSCULOSKELETAL: Denies joint pain or swelling.     /70   Ht 5' 7" (1.702 m)   Wt 89.9 kg (198 lb 3.1 oz)   LMP 2019   Physical Exam:  APPEARANCE: Well nourished, well developed, in no acute distress.  SKIN: Normal skin turgor, no lesions.  NECK: Neck symmetric without masses   RESPIRATORY: Normal respiratory effort with no retractions or use of accessory muscles  CARDIOVASCULAR: Peripheral vascular system with no swelling no varicosities and palpation of pulses normal  LYMPHATIC: No enlargements of the lymph nodes noted in the neck, axillae, or groin  ABDOMEN: " Soft. No tenderness or masses. No hepatosplenomegaly. No hernias.  BREASTS: Symmetrical, no skin changes or visible lesions. No palpable masses, nipple discharge or adenopathy bilaterally.  PELVIC: Normal external female genitalia without lesions. Normal hair distribution. Adequate perineal body, normal urethral meatus. Urethra with no masses.  Bladder nontender. Vagina moist and well rugated without lesions or discharge. Cervix pink and without lesions. No significant cystocele or rectocele. Bimanual exam showed uterus normal size, shape, position, mobile and nontender. Adnexa without masses or tenderness. Urethra and bladder normal.   EXTREMITIES: No clubbing cyanosis or edema.    ASSESSMENT/PLAN:  Gynecologic exam normal  -     Liquid-Based Pap Smear, Screening    Family history of pulmonary embolism  -     METHYLENETETRAHYDROFOL GENOTYPING ([id number]); Future; Expected date: 12/23/2019          Patient was counseled today on Pelvic exams and Pap Smear guidelines.   We discussed STD screening if at high risk for a STD.  We discussed recommendation for breast cancer screening with mammogram every other year after the age of 40 and annually after the age of 50.    We discussed colon cancer screening when indicated.   Osteoporosis screening discussed when indicated.   She was advised to see her primary care physician for all other health maintenance.     FOLLOW-UP with me for next routine visit.

## 2020-01-11 LAB
FINAL PATHOLOGIC DIAGNOSIS: NORMAL
Lab: NORMAL

## 2020-01-14 ENCOUNTER — OFFICE VISIT (OUTPATIENT)
Dept: OBSTETRICS AND GYNECOLOGY | Facility: CLINIC | Age: 25
End: 2020-01-14
Payer: COMMERCIAL

## 2020-01-14 VITALS
SYSTOLIC BLOOD PRESSURE: 120 MMHG | WEIGHT: 203.5 LBS | HEIGHT: 67 IN | DIASTOLIC BLOOD PRESSURE: 76 MMHG | BODY MASS INDEX: 31.94 KG/M2

## 2020-01-14 DIAGNOSIS — Z30.430 ENCOUNTER FOR IUD INSERTION: Primary | ICD-10-CM

## 2020-01-14 LAB
B-HCG UR QL: NEGATIVE
CTP QC/QA: YES

## 2020-01-14 PROCEDURE — 99999 PR PBB SHADOW E&M-EST. PATIENT-LVL III: ICD-10-PCS | Mod: PBBFAC,,, | Performed by: OBSTETRICS & GYNECOLOGY

## 2020-01-14 PROCEDURE — 81025 POCT URINE PREGNANCY: ICD-10-PCS | Mod: S$GLB,,, | Performed by: OBSTETRICS & GYNECOLOGY

## 2020-01-14 PROCEDURE — 99499 NO LOS: ICD-10-PCS | Mod: S$GLB,,, | Performed by: OBSTETRICS & GYNECOLOGY

## 2020-01-14 PROCEDURE — 58300 PR INSERT INTRAUTERINE DEVICE: ICD-10-PCS | Mod: S$GLB,,, | Performed by: OBSTETRICS & GYNECOLOGY

## 2020-01-14 PROCEDURE — 58300 INSERT INTRAUTERINE DEVICE: CPT | Mod: S$GLB,,, | Performed by: OBSTETRICS & GYNECOLOGY

## 2020-01-14 PROCEDURE — 99499 UNLISTED E&M SERVICE: CPT | Mod: S$GLB,,, | Performed by: OBSTETRICS & GYNECOLOGY

## 2020-01-14 PROCEDURE — 99999 PR PBB SHADOW E&M-EST. PATIENT-LVL III: CPT | Mod: PBBFAC,,, | Performed by: OBSTETRICS & GYNECOLOGY

## 2020-01-14 PROCEDURE — 87491 CHLMYD TRACH DNA AMP PROBE: CPT

## 2020-01-14 PROCEDURE — 81025 URINE PREGNANCY TEST: CPT | Mod: S$GLB,,, | Performed by: OBSTETRICS & GYNECOLOGY

## 2020-01-14 NOTE — PROGRESS NOTES
TIMEOUT PERFORMED  Patient identified, device confirmed, and allergies reviewed.     IUD PLACEMENT:    Female patient  presents for an IUD placement.    negative UPT    PRE-IUD PLACEMENT COUNSELING:  All contraceptive options were reviewed and the patient chooses an IUD.  The patient's history was reviewed and there are no contraindications to an IUD. The procedure and minimal risks of pain, bleeding, perforation and infection at the insertion and spontaneous expulsion within the first two weeks was discussed. The benefits of amenorrhea and no systemic side effects were explained. All questions were answered and the patient agrees to proceed. Consent was signed (scanned into computer).    EXAM:  Uterine Position: AV    PROCEDURE:  TIME OUT PERFORMED.  The cervix visualized with a speculum.  A single tooth tenaculum placed on the anterior lip.  The uterus sounded to 7 cm using sterile technique.    The Mirena       IUD was loaded and placed high in uterine fundus without difficulty using sterile technique.  The string was cut to 2cm length from exo cervix.  The tenaculum and speculum were removed. The patient tolerated the procedure well.    ASSESSMENT:  1. Contraception management / IUD insertion.V25.0.    POST IUD PLACEMENT COUNSELING:  Manage post IUD placement pain with NSAIDs, Tylenol or Rx per [employer].  IUD danger signs and how to check the strings.  Removal in 5 years for Mirena IUD and in 10 years for Copper IUD.    Counseling lasted approximately 15 minutes and all her questions were answered.    FOLLOW-UP: With me in 6 weeks.

## 2020-01-15 LAB
C TRACH DNA SPEC QL NAA+PROBE: NOT DETECTED
N GONORRHOEA DNA SPEC QL NAA+PROBE: NOT DETECTED

## 2020-01-17 ENCOUNTER — OFFICE VISIT (OUTPATIENT)
Dept: PSYCHIATRY | Facility: CLINIC | Age: 25
End: 2020-01-17
Payer: COMMERCIAL

## 2020-01-17 PROCEDURE — 90837 PSYTX W PT 60 MINUTES: CPT | Mod: S$GLB,,, | Performed by: SOCIAL WORKER

## 2020-01-17 PROCEDURE — 90837 PR PSYCHOTHERAPY W/PATIENT, 60 MIN: ICD-10-PCS | Mod: S$GLB,,, | Performed by: SOCIAL WORKER

## 2020-01-17 NOTE — PROGRESS NOTES
Psychiatry Initial Visit (PhD/LCSW)  Diagnostic Interview - CPT 95208    Date: 1/17/2020    Site: [place]    Referral source: self-referral      EAP Visit #1 of 5    [facility]      Diagnostic Impression - Plan:       ICD-10-CM ICD-9-CM   1. Ill-defined and unknown cause of mortality R99 799.9       Plan:individual psychotherapy    Return to Clinic: 1 week    Length of Service (minutes): 45

## 2020-01-22 ENCOUNTER — OFFICE VISIT (OUTPATIENT)
Dept: PSYCHIATRY | Facility: CLINIC | Age: 25
End: 2020-01-22
Payer: COMMERCIAL

## 2020-01-22 PROCEDURE — 90834 PSYTX W PT 45 MINUTES: CPT | Mod: S$GLB,,, | Performed by: SOCIAL WORKER

## 2020-01-22 PROCEDURE — 90834 PR PSYCHOTHERAPY W/PATIENT, 45 MIN: ICD-10-PCS | Mod: S$GLB,,, | Performed by: SOCIAL WORKER

## 2020-01-22 NOTE — PROGRESS NOTES
Individual Psychotherapy (PhD/LCSW)    1/22/2020    #2 EAP Visit of 5     See clinician's confidential notes.     Diagnosis:     ICD-10-CM ICD-9-CM   1. Ill-defined and unknown cause of mortality R99 799.9       Plan:  individual psychotherapy    Return to clinic: as scheduled    Length of Service (minutes): 45

## 2020-02-20 ENCOUNTER — OFFICE VISIT (OUTPATIENT)
Dept: PSYCHIATRY | Facility: CLINIC | Age: 25
End: 2020-02-20
Payer: COMMERCIAL

## 2020-02-20 PROCEDURE — 90834 PSYTX W PT 45 MINUTES: CPT | Mod: S$GLB,,, | Performed by: SOCIAL WORKER

## 2020-02-20 PROCEDURE — 90834 PR PSYCHOTHERAPY W/PATIENT, 45 MIN: ICD-10-PCS | Mod: S$GLB,,, | Performed by: SOCIAL WORKER

## 2020-02-20 NOTE — PROGRESS NOTES
Individual Psychotherapy (PhD/LCSW)    2/20/2020    Site:  [facility] #3 of 5    See clinician's confidential notes.     Diagnosis:     ICD-10-CM ICD-9-CM   1. Ill-defined and unknown cause of mortality R99 799.9       Plan:  individual psychotherapy    Return to clinic: as scheduled    Length of Service (minutes): 45

## 2020-03-03 ENCOUNTER — OFFICE VISIT (OUTPATIENT)
Dept: PSYCHIATRY | Facility: CLINIC | Age: 25
End: 2020-03-03
Payer: COMMERCIAL

## 2020-03-03 PROCEDURE — 90837 PR PSYCHOTHERAPY W/PATIENT, 60 MIN: ICD-10-PCS | Mod: S$GLB,,, | Performed by: SOCIAL WORKER

## 2020-03-03 PROCEDURE — 90837 PSYTX W PT 60 MINUTES: CPT | Mod: S$GLB,,, | Performed by: SOCIAL WORKER

## 2020-03-03 NOTE — PROGRESS NOTES
Individual Psychotherapy (PhD/LCSW)    3/3/2020    Site:  [facility] Visit 4 of 5    Diagnosis:     ICD-10-CM ICD-9-CM   1. Ill-defined and unknown cause of mortality R99 799.9       Plan:  individual psychotherapy    Return to clinic: as scheduled    Length of Service (minutes): 60

## 2020-03-23 ENCOUNTER — PATIENT MESSAGE (OUTPATIENT)
Dept: PSYCHIATRY | Facility: CLINIC | Age: 25
End: 2020-03-23

## 2020-04-03 ENCOUNTER — LAB VISIT (OUTPATIENT)
Dept: LAB | Facility: HOSPITAL | Age: 25
End: 2020-04-03
Attending: INTERNAL MEDICINE
Payer: COMMERCIAL

## 2020-04-03 DIAGNOSIS — E61.1 IRON DEFICIENCY: Primary | ICD-10-CM

## 2020-04-03 LAB
ALBUMIN SERPL BCP-MCNC: 3.9 G/DL (ref 3.5–5.2)
ALP SERPL-CCNC: 75 U/L (ref 55–135)
ALT SERPL W/O P-5'-P-CCNC: 20 U/L (ref 10–44)
ANION GAP SERPL CALC-SCNC: 8 MMOL/L (ref 8–16)
AST SERPL-CCNC: 16 U/L (ref 10–40)
BASOPHILS # BLD AUTO: 0.04 K/UL (ref 0–0.2)
BASOPHILS NFR BLD: 0.5 % (ref 0–1.9)
BILIRUB SERPL-MCNC: 0.3 MG/DL (ref 0.1–1)
BUN SERPL-MCNC: 9 MG/DL (ref 6–20)
CALCIUM SERPL-MCNC: 9.2 MG/DL (ref 8.7–10.5)
CHLORIDE SERPL-SCNC: 105 MMOL/L (ref 95–110)
CO2 SERPL-SCNC: 25 MMOL/L (ref 23–29)
CREAT SERPL-MCNC: 0.8 MG/DL (ref 0.5–1.4)
DIFFERENTIAL METHOD: ABNORMAL
EOSINOPHIL # BLD AUTO: 0.1 K/UL (ref 0–0.5)
EOSINOPHIL NFR BLD: 1.1 % (ref 0–8)
ERYTHROCYTE [DISTWIDTH] IN BLOOD BY AUTOMATED COUNT: 13.6 % (ref 11.5–14.5)
EST. GFR  (AFRICAN AMERICAN): >60 ML/MIN/1.73 M^2
EST. GFR  (NON AFRICAN AMERICAN): >60 ML/MIN/1.73 M^2
FERRITIN SERPL-MCNC: 15 NG/ML (ref 20–300)
GLUCOSE SERPL-MCNC: 90 MG/DL (ref 70–110)
HCT VFR BLD AUTO: 37.6 % (ref 37–48.5)
HGB BLD-MCNC: 11.7 G/DL (ref 12–16)
IMM GRANULOCYTES # BLD AUTO: 0.02 K/UL (ref 0–0.04)
IMM GRANULOCYTES NFR BLD AUTO: 0.2 % (ref 0–0.5)
IRON SERPL-MCNC: 50 UG/DL (ref 30–160)
LYMPHOCYTES # BLD AUTO: 2.4 K/UL (ref 1–4.8)
LYMPHOCYTES NFR BLD: 29.3 % (ref 18–48)
MCH RBC QN AUTO: 26.2 PG (ref 27–31)
MCHC RBC AUTO-ENTMCNC: 31.1 G/DL (ref 32–36)
MCV RBC AUTO: 84 FL (ref 82–98)
MONOCYTES # BLD AUTO: 0.6 K/UL (ref 0.3–1)
MONOCYTES NFR BLD: 7.8 % (ref 4–15)
NEUTROPHILS # BLD AUTO: 4.9 K/UL (ref 1.8–7.7)
NEUTROPHILS NFR BLD: 61.1 % (ref 38–73)
NRBC BLD-RTO: 0 /100 WBC
PLATELET # BLD AUTO: 349 K/UL (ref 150–350)
PMV BLD AUTO: 10.2 FL (ref 9.2–12.9)
POTASSIUM SERPL-SCNC: 3.7 MMOL/L (ref 3.5–5.1)
PROT SERPL-MCNC: 7.6 G/DL (ref 6–8.4)
RBC # BLD AUTO: 4.47 M/UL (ref 4–5.4)
SATURATED IRON: 13 % (ref 20–50)
SODIUM SERPL-SCNC: 138 MMOL/L (ref 136–145)
TOTAL IRON BINDING CAPACITY: 400 UG/DL (ref 250–450)
TRANSFERRIN SERPL-MCNC: 270 MG/DL (ref 200–375)
WBC # BLD AUTO: 8.05 K/UL (ref 3.9–12.7)

## 2020-04-03 PROCEDURE — 36415 COLL VENOUS BLD VENIPUNCTURE: CPT

## 2020-04-03 PROCEDURE — 85025 COMPLETE CBC W/AUTO DIFF WBC: CPT

## 2020-04-03 PROCEDURE — 82728 ASSAY OF FERRITIN: CPT

## 2020-04-03 PROCEDURE — 83540 ASSAY OF IRON: CPT

## 2020-04-03 PROCEDURE — 80053 COMPREHEN METABOLIC PANEL: CPT

## 2020-04-03 NOTE — PROGRESS NOTES
[facility] Hematology/Oncology  PROGRESS NOTE -   Telemedicine Visit      Subjective:       Patient ID:   NAME: Mena Ovalle : 1995     24 y.o. female    Referring Doc: none  Other Physicians: Destiny Odonnell    Chief Complaint:  Iron defic anemia f/u    History of Present Illness:     Patient is being seen today via a telemedicine follow-up visit in lieu of a normal in-person visit due to the recent COVID19 outbreak. The patient is currently located at home. This visit type is a virtual visit with synchronous audio with video.     The patient is here today to go over the results of the recently ordered labs, tests and studies. She had repeat labs this am. She is here by herself. She denies any CP, SOB, HA's or N/V.  She has some fatigue. She has not been taking any oral iron.        ROS:   GEN: normal without any fever, night sweats or weight loss; fatigue  HEENT: normal with no HA's, sore throat, stiff neck, changes in vision  CV: normal with no CP, SOB, PND, EDDY or orthopnea  PULM: normal with no SOB, cough, hemoptysis, sputum or pleuritic pain  GI: normal with no abdominal pain, nausea, vomiting, constipation, diarrhea, melanotic stools, BRBPR, or hematemesis  : normal with no hematuria, dysuria  BREAST: normal with no mass, discharge, pain  SKIN: normal with no rash, erythema, bruising, or swelling    Allergies:  Review of patient's allergies indicates:  No Known Allergies    Medications:    Current Outpatient Medications:     iron-vitamin C 100-250 mg, ICAR-C, (ICAR-C) 100-250 mg Tab, Take 1 tablet by mouth once daily., Disp: 30 each, Rfl: 6    PMHx/PSHx Updates:  See patient's last visit with me on 2019.  See H&P on 2018        Pathology:  Cancer Staging  No matching staging information was found for the patient.          Objective:     Vitals:  Afebrile      Physical Examination:   GEN: no apparent distress, comfortable; AAOx3  HEAD: atraumatic and normocephalic  EYES: no conjunctival pallor  or muddiness, no icterus; normal pupil reaction to ambient light  ENT: OMM, no pharyngeal erythema, external bilateral ears WNL; no visible thrush or ulcers  NECK: no masses or swelling, trachea midline, no visible LAD/LN's   CV: no palpitations; no pedal edema; no noticeable JVD or neck vein distension  CHEST: Normal respiratory effort; chest wall breath movements symmetrical; no audible wheezing  ABDOM: non-distended; no bloating  MUSC/Skeletal: ROM normal; joints visibly normal; no deformities or arthropathy  EXTREM: no clubbing, cyanosis, inflammation or swelling  SKIN: no rashes, lesions, ulcers, petechiae or subcutaneous nodules  : no guevara  NEURO: moving all 4 extremities; AAOx3; no tremors  PSYCH: normal mood, affect and behavior  LYMPH: no visible LN's or LAD        Labs:     4/3/2020  Lab Results   Component Value Date    WBC 8.05 04/03/2020    HGB 11.7 (L) 04/03/2020    HCT 37.6 04/03/2020    MCV 84 04/03/2020     04/03/2020     CMP  Sodium   Date Value Ref Range Status   04/03/2020 138 136 - 145 mmol/L Final   08/21/2018 135 134 - 144 mmol/L      Potassium   Date Value Ref Range Status   04/03/2020 3.7 3.5 - 5.1 mmol/L Final     Chloride   Date Value Ref Range Status   04/03/2020 105 95 - 110 mmol/L Final   08/21/2018 101 98 - 110 mmol/L      CO2   Date Value Ref Range Status   04/03/2020 25 23 - 29 mmol/L Final     Glucose   Date Value Ref Range Status   04/03/2020 90 70 - 110 mg/dL Final   08/21/2018 116 (H) 70 - 99 mg/dL      BUN, Bld   Date Value Ref Range Status   04/03/2020 9 6 - 20 mg/dL Final     Creatinine   Date Value Ref Range Status   04/03/2020 0.8 0.5 - 1.4 mg/dL Final   08/21/2018 0.76 0.60 - 1.40 mg/dL      Calcium   Date Value Ref Range Status   04/03/2020 9.2 8.7 - 10.5 mg/dL Final     Total Protein   Date Value Ref Range Status   04/03/2020 7.6 6.0 - 8.4 g/dL Final     Albumin   Date Value Ref Range Status   04/03/2020 3.9 3.5 - 5.2 g/dL Final   08/21/2018 4.3 3.1 - 4.7 g/dL       Total Bilirubin   Date Value Ref Range Status   04/03/2020 0.3 0.1 - 1.0 mg/dL Final     Comment:     For infants and newborns, interpretation of results should be based  on gestational age, weight and in agreement with clinical  observations.  Premature Infant recommended reference ranges:  Up to 24 hours.............<8.0 mg/dL  Up to 48 hours............<12.0 mg/dL  3-5 days..................<15.0 mg/dL  6-29 days.................<15.0 mg/dL       Alkaline Phosphatase   Date Value Ref Range Status   04/03/2020 75 55 - 135 U/L Final     AST   Date Value Ref Range Status   04/03/2020 16 10 - 40 U/L Final     ALT   Date Value Ref Range Status   04/03/2020 20 10 - 44 U/L Final     Anion Gap   Date Value Ref Range Status   04/03/2020 8 8 - 16 mmol/L Final     eGFR if    Date Value Ref Range Status   04/03/2020 >60 >60 mL/min/1.73 m^2 Final     eGFR if non    Date Value Ref Range Status   04/03/2020 >60 >60 mL/min/1.73 m^2 Final     Comment:     Calculation used to obtain the estimated glomerular filtration  rate (eGFR) is the CKD-EPI equation.          Lab Results   Component Value Date    IRON 50 04/03/2020    TIBC 400 04/03/2020    FERRITIN 15 (L) 04/03/2020         Radiology/Diagnostic Studies:    No results found.    I have reviewed all available lab results and radiology reports.    Assessment/Plan:   (1) 24 y.o. female with diagnosis of iron deficiency anemia who has been referred by Yaz for evaluation by medical hematology/oncology. She recently had a baby about 8 months ago.   - her hgb is now a little lower at 11.7  - MCV is normal at 86.0  - iron is good but ferritin down to 15       (2) Vertigo - s/p prednisone in past            Iron deficiency          PLAN:  1. Check labs every 3 months; repeat order MTHFR  2. Call in ICAR-C po daily for now  3. F/u with PCP and GYN  4. RTC in 6 months  Fax note to Destiny Odonnell    Discussion:       Total Time spent on  patient:    I spent over 25 mins of time with the patient. Reviewed results of the recently ordered labs, tests, reports and studies; made directives with regards to the results. Over half of this time was spent couseling and coordinating care, making treatment and analytical decisions; ordering necessary labs, tests and studies; and discussing treatment options and setting up treatment plan(s) if indicated.        COVID-19 Discussion:    I had long discussion with patient and any applicable family about the COVID-19 coronavirus epidemic and the recommended precautions with regard to cancer and/or hematology patients. I have re-iterated the CDC recommendations for adequate hand washing, use of hand -like products, and coughing into elbow, etc. In addition, especially for our patients who are on chemotherapy and/or our otherwise immunocompromised patients, I have recommended avoidance of crowds, including movie theaters, restaurants, churches, etc. I have recommended avoidance of any sick or symptomatic family members and/or friends. I have also recommended avoidance of any raw and unwashed food products, and general avoidance of food items that have not been prepared by themselves. The patient has been asked to call us immediately with any symptom developments, issues, questions or other general concerns.       Telemedicine Statement:    The patient acknowledged and agreed to the audio/video encounter and the patient who is being provided medical services by telemedicine is:  (1) informed of the relationship between the physician and patient and the respective role of any other health care provider with respect to management of the patient; and (2) notified that he or she may decline to receive medical services by telemedicine and may withdraw from such care at any time.      I have explained all of the above in detail and the patient understands all of the current recommendation(s). I have answered all of  their questions to the best of my ability and to their complete satisfaction.   The patient is to continue with the current management plan.            Electronically signed by Kaz Saldana MD        Answers for HPI/ROS submitted by the patient on 4/6/2020   appetite change : No  unexpected weight change: No  visual disturbance: No  cough: No  shortness of breath: No  chest pain: No  abdominal pain: No  diarrhea: No  frequency: No  back pain: No  rash: No  headaches: No  adenopathy: No  nervous/ anxious: Yes

## 2020-04-06 ENCOUNTER — OFFICE VISIT (OUTPATIENT)
Dept: PSYCHIATRY | Facility: CLINIC | Age: 25
End: 2020-04-06
Payer: COMMERCIAL

## 2020-04-06 ENCOUNTER — OFFICE VISIT (OUTPATIENT)
Dept: HEMATOLOGY/ONCOLOGY | Facility: CLINIC | Age: 25
End: 2020-04-06
Payer: COMMERCIAL

## 2020-04-06 DIAGNOSIS — E61.1 IRON DEFICIENCY: Primary | ICD-10-CM

## 2020-04-06 DIAGNOSIS — D50.9 IRON DEFICIENCY ANEMIA, UNSPECIFIED IRON DEFICIENCY ANEMIA TYPE: ICD-10-CM

## 2020-04-06 PROCEDURE — 99213 OFFICE O/P EST LOW 20 MIN: CPT | Mod: 95,,, | Performed by: INTERNAL MEDICINE

## 2020-04-06 PROCEDURE — 99201 PR OFFICE/OUTPT VISIT,NEW,LEVL I: CPT | Mod: 95,,, | Performed by: SOCIAL WORKER

## 2020-04-06 PROCEDURE — 99213 PR OFFICE/OUTPT VISIT, EST, LEVL III, 20-29 MIN: ICD-10-PCS | Mod: 95,,, | Performed by: INTERNAL MEDICINE

## 2020-04-06 PROCEDURE — 99201 PR OFFICE/OUTPT VISIT,NEW,LEVL I: ICD-10-PCS | Mod: 95,,, | Performed by: SOCIAL WORKER

## 2020-04-06 RX ORDER — IRON,CARBONYL/ASCORBIC ACID 100-250 MG
1 TABLET ORAL DAILY
Qty: 30 EACH | Refills: 6 | Status: SHIPPED | OUTPATIENT
Start: 2020-04-06 | End: 2020-07-08 | Stop reason: ALTCHOICE

## 2020-04-06 NOTE — PROGRESS NOTES
Individual Psychotherapy (PhD/LCSW)    4/6/2020    Site:  [facility] Visit - Virtual Session and client was at her place of employment when we conducted same.     See clinician's confidential notes if necessary.             Diagnosis:   1. Ill-defined and unknown cause of mortality        Plan:  individual psychotherapy    Return to clinic: as scheduled    Length of Service (minutes): 45

## 2020-04-07 ENCOUNTER — TELEPHONE (OUTPATIENT)
Dept: HEMATOLOGY/ONCOLOGY | Facility: CLINIC | Age: 25
End: 2020-04-07

## 2020-04-20 ENCOUNTER — TELEPHONE (OUTPATIENT)
Dept: PSYCHIATRY | Facility: CLINIC | Age: 25
End: 2020-04-20

## 2020-04-20 ENCOUNTER — PATIENT MESSAGE (OUTPATIENT)
Dept: PSYCHIATRY | Facility: CLINIC | Age: 25
End: 2020-04-20

## 2020-04-20 NOTE — TELEPHONE ENCOUNTER
----- Message from Natalya Calvo LCSW sent at 4/20/2020  3:54 PM CDT -----  Regarding: Scheduling  She had trouble with virtual.  Please give her another appt asap.

## 2020-04-21 ENCOUNTER — OFFICE VISIT (OUTPATIENT)
Dept: PSYCHIATRY | Facility: CLINIC | Age: 25
End: 2020-04-21
Payer: COMMERCIAL

## 2020-04-21 DIAGNOSIS — F43.20 ADJUSTMENT DISORDER, UNSPECIFIED TYPE: Primary | ICD-10-CM

## 2020-04-21 DIAGNOSIS — Z01.84 ANTIBODY RESPONSE EXAMINATION: ICD-10-CM

## 2020-04-21 PROCEDURE — 90834 PSYTX W PT 45 MINUTES: CPT | Mod: 95,,, | Performed by: SOCIAL WORKER

## 2020-04-21 PROCEDURE — 90834 PR PSYCHOTHERAPY W/PATIENT, 45 MIN: ICD-10-PCS | Mod: 95,,, | Performed by: SOCIAL WORKER

## 2020-04-22 ENCOUNTER — LAB VISIT (OUTPATIENT)
Dept: LAB | Facility: HOSPITAL | Age: 25
End: 2020-04-22
Attending: INTERNAL MEDICINE
Payer: COMMERCIAL

## 2020-04-22 DIAGNOSIS — Z01.84 ANTIBODY RESPONSE EXAMINATION: ICD-10-CM

## 2020-04-22 LAB — SARS-COV-2 IGG SERPL QL IA: NEGATIVE

## 2020-04-22 PROCEDURE — 86769 SARS-COV-2 COVID-19 ANTIBODY: CPT

## 2020-04-22 PROCEDURE — 36415 COLL VENOUS BLD VENIPUNCTURE: CPT

## 2020-05-19 ENCOUNTER — CLINICAL SUPPORT (OUTPATIENT)
Dept: URGENT CARE | Facility: CLINIC | Age: 25
End: 2020-05-19
Payer: COMMERCIAL

## 2020-05-19 ENCOUNTER — PATIENT MESSAGE (OUTPATIENT)
Dept: PSYCHIATRY | Facility: CLINIC | Age: 25
End: 2020-05-19

## 2020-05-19 VITALS
BODY MASS INDEX: 32.74 KG/M2 | WEIGHT: 208.63 LBS | OXYGEN SATURATION: 98 % | DIASTOLIC BLOOD PRESSURE: 75 MMHG | HEART RATE: 78 BPM | HEIGHT: 67 IN | SYSTOLIC BLOOD PRESSURE: 119 MMHG | TEMPERATURE: 98 F

## 2020-05-19 DIAGNOSIS — B86 SCABIES: Primary | ICD-10-CM

## 2020-05-19 DIAGNOSIS — L25.9 CONTACT DERMATITIS, UNSPECIFIED CONTACT DERMATITIS TYPE, UNSPECIFIED TRIGGER: ICD-10-CM

## 2020-05-19 PROCEDURE — 99204 PR OFFICE/OUTPT VISIT, NEW, LEVL IV, 45-59 MIN: ICD-10-PCS | Mod: 25,S$GLB,, | Performed by: NURSE PRACTITIONER

## 2020-05-19 PROCEDURE — 96372 THER/PROPH/DIAG INJ SC/IM: CPT | Mod: S$GLB,,, | Performed by: NURSE PRACTITIONER

## 2020-05-19 PROCEDURE — 96372 PR INJECTION,THERAP/PROPH/DIAG2ST, IM OR SUBCUT: ICD-10-PCS | Mod: S$GLB,,, | Performed by: NURSE PRACTITIONER

## 2020-05-19 PROCEDURE — 99204 OFFICE O/P NEW MOD 45 MIN: CPT | Mod: 25,S$GLB,, | Performed by: NURSE PRACTITIONER

## 2020-05-19 RX ORDER — PERMETHRIN 50 MG/G
CREAM TOPICAL ONCE
Qty: 60 G | Refills: 0 | Status: SHIPPED | OUTPATIENT
Start: 2020-05-19 | End: 2020-05-19

## 2020-05-19 RX ORDER — PREDNISONE 20 MG/1
20 TABLET ORAL 2 TIMES DAILY
Qty: 10 TABLET | Refills: 0 | Status: SHIPPED | OUTPATIENT
Start: 2020-05-19 | End: 2020-05-24

## 2020-05-19 RX ORDER — DEXAMETHASONE SODIUM PHOSPHATE 4 MG/ML
8 INJECTION, SOLUTION INTRA-ARTICULAR; INTRALESIONAL; INTRAMUSCULAR; INTRAVENOUS; SOFT TISSUE
Status: COMPLETED | OUTPATIENT
Start: 2020-05-19 | End: 2020-05-19

## 2020-05-19 RX ORDER — HYDROXYZINE HYDROCHLORIDE 50 MG/1
50 TABLET, FILM COATED ORAL 4 TIMES DAILY PRN
Qty: 30 TABLET | Refills: 0 | Status: SHIPPED | OUTPATIENT
Start: 2020-05-19 | End: 2020-07-08 | Stop reason: ALTCHOICE

## 2020-05-19 RX ADMIN — DEXAMETHASONE SODIUM PHOSPHATE 8 MG: 4 INJECTION, SOLUTION INTRA-ARTICULAR; INTRALESIONAL; INTRAMUSCULAR; INTRAVENOUS; SOFT TISSUE at 11:05

## 2020-05-19 NOTE — PATIENT INSTRUCTIONS
Scabies     To prevent spread of infection, wash clothing, linens, and toys in very hot water.     Scabies is an infection caused by very tiny mites that burrow into the skin. The mites are called Sarcoptes scabiei. They cause severe itching. Though children are most commonly infected, anyone can get scabies. Scabies mites can pass from person to person through close physical contact. They can also be passed through shared clothing, towels, and bedding. Scabies infection is not usually dangerous, but it is uncomfortable. Because it is so contagious, scabies should be treated immediately to keep the infection from spreading.  Symptoms  Symptoms of scabies appear about 2 to 6 weeks after infection in a child or adult who has never had scabies before. A child or adult who has been infected before will experience symptoms much sooner, in 1 to 4 days. Signs of scabies infection may include:  · Intense itching, especially at night or after a hot bath  · Skin irritations that look like hives, insect bites, pimples, or blisters, especially on warmer areas of the body (such as between the fingers, in the armpits, and in the creases of the wrists, elbows, and knees)  · Sores on the body caused by scratching (the sores may become infected)  · Rafael Gonzalez created by mites traveling under the skin, which look like lines on the skins surface  Treating scabies infection  Scabies infections are usually treated with a prescription lotion that kills the mites. The lotion must be applied to the entire body from the neck down. This includes the palms of the hands, soles of the feet, groin, and under the fingernails. The lotion must be left on for 8 to 14 hours. In some cases, a second application of lotion is needed a week after the first. Medicines work quickly, but most children and adults continue to have an itchy rash for several weeks after treatment. Marks on the skin from scabies usually go away in 1 to 2 weeks, but sometimes  take a few months to clear.  Preventing spread of the infection  To prevent reinfection and the spread of scabies to others, follow these instructions:  · Wash the infected persons clothing, towels, bed linens, cloth toys, and other personal items in very hot, soapy water. Dry them thoroughly. Do not share among family members.   · Seal items that cant be washed in plastic bags for 2 weeks.  · Vacuum floors and furniture. Throw the vacuum bag away afterward.  · Notify an infected childs school and caregivers so that other children can be checked and treated.  · Keep an infected child home from  or school until the morning after treatment for scabies.  · Warn children not to share items such as clothing and towels with other children.  · You may need to treat all household members who may have been exposed to scabies, whether they show symptoms or not. Talk with your healthcare provider.  · Do not spray your house with chemicals or pesticides. These can be dangerous to your familys health.  When to call the healthcare provider if:  · The infected person has a fever, red streaks, pain, or swelling of the skin.  · Sores get worse or do not heal.  · New rashes appear or itching continues for more than 2 weeks after treatment.   Date Last Reviewed: 6/1/2016 © 2000-2017 [employer]. [address] [place] [address], [place] [address]. All rights reserved. This information is not intended as a substitute for professional medical care. Always follow your healthcare professional's instructions.        Contact Dermatitis  Contact dermatitis is a skin rash caused by something that touches the skin and makes it irritated and inflamed. Your skin may be red, swollen, dry, and may be cracked. Blisters may form and ooze. The rash will itch.  Contact dermatitis can form on the face and neck, backs of hands, forearms, genitals, and lower legs.  People can get contact dermatitis from lots of sources. These  "include:  · Plants such as poison ivy, oak, or sumac  · Chemicals in hair dyes and rinses, soaps, solvents, waxes, fingernail polish, and deodorants   · Jewelry or watchbands made of nickel  Contact dermatitis is not passed from person to person.  Talk with your healthcare provider about what may have caused the rash. A type of allergy testing called "patch testing" may be used to discover what you are allergic to. You will need to avoid the source of your rash in the future to prevent it from coming back.  Treatment is done to relieve itching and prevent the rash from coming back. The rash should go away in a few days to a few weeks.  Home care  Your healthcare provider may prescribe medicine to relieve swelling and itching. Follow all instructions when using these medicines.  General care:  · Avoid anything that heats up your skin, such as hot showers or baths, or direct sunlight. This can make itching worse.  · Apply cold compresses to soothe your sores to help relieve your symptoms. Do this for 30 minutes 3 to 4 times a day. You can make a cold compress by soaking a cloth in cold water. Squeeze out excess water. You can add colloidal oatmeal to the water to help reduce itching. For severe itching in a small area, apply an ice pack wrapped in a thin towel. Do this for 20 minutes 3 to 4 times a day.  · You can also try wet dressings. One way to do this is to wear a wet piece of clothing under a dry one. Wear a damp shirt under a dry shirt if your upper body is affected. This can relieve itching and prevent you from scratching the affected area.  · You can also help relieve large areas of itching by taking a lukewarm bath with colloidal oatmeal added to the water.  · Use hydrocortisone cream for redness and irritation, unless another medicine was prescribed. You can also use benzocaine anesthetic cream or spray. Calamine lotion can also relieve mild symptoms.  · Use oral diphenhydramine to help reduce itching. You " can buy this antihistamine at drug and grocery stores. It can make you sleepy, so use lower doses during the daytime. Or you can use loratadine. This is an antihistamine that will not make you sleepy. Do not use diphenhydramine if you have glaucoma or have trouble urinating due to an enlarged prostate.  · If a plant causes your rash, make sure to wash your skin and the clothes you were wearing when you came into contact with the plant. This is to wash away the plant oils that gave you the rash and prevent more or worse symptoms.  · Stay away from the substance or object that causes your symptoms. If you cant avoid it, wear gloves or some other type of protection.  Follow-up care  Follow up with your healthcare provider, or as advised.  When to seek medical advice  Call your healthcare provider right away if any of these occur:  · Spreading of the rash to other parts of your body  · Severe swelling of your face, eyelids, mouth, throat or tongue  · Trouble urinating due to swelling in the genital area  · Fever of 100.4°F (38°C) or higher  · Redness or swelling that gets worse  · Pain that gets worse  · Foul-smelling fluid leaking from the skin  · Yellow-brown crusts on the open blisters  Date Last Reviewed: 9/1/2016  © 9719-6692 [employer]. [address] [place] [address], [place] [address]. All rights reserved. This information is not intended as a substitute for professional medical care. Always follow your healthcare professional's instructions.

## 2020-05-19 NOTE — PROGRESS NOTES
"Subjective:       Patient ID: Mena Ovalle is a 24 y.o. female.    Vitals:  height is 5' 7" (1.702 m) and weight is 94.6 kg (208 lb 9.6 oz). Her oral temperature is 98.3 °F (36.8 °C). Her blood pressure is 119/75 and her pulse is 78. Her oxygen saturation is 98%.     Chief Complaint: Rash    Reports intense itching, worse at night    Rash   This is a new problem. The current episode started in the past 7 days. The problem has been gradually worsening since onset. The affected locations include the neck, back, chest, left ear, right ear, left arm, right arm, left buttock, right buttock, right upper leg, right lower leg, left upper leg, left lower leg, left shoulder and right shoulder. The rash is characterized by itchiness, redness and burning (Hot to touch). It is unknown if there was an exposure to a precipitant. Pertinent negatives include no cough, fever or sore throat. Past treatments include antihistamine. The treatment provided no relief.       Constitution: Negative for chills and fever.   HENT: Negative for facial swelling and sore throat.    Neck: Negative for painful lymph nodes.   Eyes: Negative for eye itching and eyelid swelling.   Respiratory: Negative for cough.    Musculoskeletal: Negative for joint pain and joint swelling.   Skin: Positive for rash and erythema. Negative for color change, pale, wound, abrasion, laceration, lesion, skin thickening/induration, puncture wound, bruising, abscess, avulsion and hives.   Allergic/Immunologic: Negative for environmental allergies, immunocompromised state and hives.   Hematologic/Lymphatic: Negative for swollen lymph nodes.       Objective:      Physical Exam   Constitutional: She is oriented to person, place, and time. She appears well-developed and well-nourished.   HENT:   Head: Normocephalic and atraumatic. Head is without abrasion, without contusion and without laceration.   Right Ear: External ear normal.   Left Ear: External ear normal.   Nose: " Nose normal.   Mouth/Throat: Oropharynx is clear and moist and mucous membranes are normal.   Eyes: Pupils are equal, round, and reactive to light. Conjunctivae, EOM and lids are normal.   Neck: Trachea normal, full passive range of motion without pain and phonation normal. Neck supple.   Cardiovascular: Normal rate, regular rhythm and normal heart sounds.   Pulmonary/Chest: Effort normal and breath sounds normal. No stridor. No respiratory distress.   Musculoskeletal: Normal range of motion.   Neurological: She is alert and oriented to person, place, and time.   Skin: Skin is warm, dry, intact, rash and urticarial. Capillary refill takes less than 2 seconds. Lesions:  erythemaabrasion, burn, bruising and ecchymosis       Psychiatric: She has a normal mood and affect. Her speech is normal and behavior is normal. Judgment and thought content normal. Cognition and memory are normal.   Nursing note and vitals reviewed.        Assessment:       1. Scabies    2. Contact dermatitis, unspecified contact dermatitis type, unspecified trigger        Plan:     concern for scabies, will treat with permethrin    Scabies    Contact dermatitis, unspecified contact dermatitis type, unspecified trigger    Other orders  -     predniSONE (DELTASONE) 20 MG tablet; Take 1 tablet (20 mg total) by mouth 2 (two) times daily. for 5 days  Dispense: 10 tablet; Refill: 0  -     dexamethasone injection 8 mg  -     hydrOXYzine (ATARAX) 50 MG tablet; Take 1 tablet (50 mg total) by mouth 4 (four) times daily as needed for Itching.  Dispense: 30 tablet; Refill: 0  -     permethrin (ELIMITE) 5 % cream; Apply topically once. for 1 dose  Dispense: 60 g; Refill: 0

## 2020-05-19 NOTE — LETTER
May 19, 2020      [facility]  [address]  [facility] [address]  Phone: [phone]       Patient: Mena Ovalle   YOB: 1995  Date of Visit: 05/19/2020    To Whom It May Concern:    Omega Ovalle  was at [facility] on 05/19/2020. She may return to work/school on 5/21/2020 with no restrictions. If you have any questions or concerns, or if I can be of further assistance, please do not hesitate to contact me.    Sincerely,    JACKIE Patel

## 2020-07-08 ENCOUNTER — OFFICE VISIT (OUTPATIENT)
Dept: FAMILY MEDICINE | Facility: CLINIC | Age: 25
End: 2020-07-08
Payer: COMMERCIAL

## 2020-07-08 VITALS
SYSTOLIC BLOOD PRESSURE: 122 MMHG | BODY MASS INDEX: 32.7 KG/M2 | WEIGHT: 208.31 LBS | RESPIRATION RATE: 18 BRPM | OXYGEN SATURATION: 97 % | HEART RATE: 91 BPM | TEMPERATURE: 98 F | HEIGHT: 67 IN | DIASTOLIC BLOOD PRESSURE: 84 MMHG

## 2020-07-08 DIAGNOSIS — Z13.220 NEED FOR LIPID SCREENING: ICD-10-CM

## 2020-07-08 DIAGNOSIS — Z86.2 HISTORY OF IRON DEFICIENCY ANEMIA: Primary | ICD-10-CM

## 2020-07-08 DIAGNOSIS — E66.9 OBESITY (BMI 30-39.9): ICD-10-CM

## 2020-07-08 PROCEDURE — 99999 PR PBB SHADOW E&M-EST. PATIENT-LVL IV: CPT | Mod: PBBFAC,,, | Performed by: FAMILY MEDICINE

## 2020-07-08 PROCEDURE — 99999 PR PBB SHADOW E&M-EST. PATIENT-LVL IV: ICD-10-PCS | Mod: PBBFAC,,, | Performed by: FAMILY MEDICINE

## 2020-07-08 PROCEDURE — 3008F PR BODY MASS INDEX (BMI) DOCUMENTED: ICD-10-PCS | Mod: CPTII,S$GLB,, | Performed by: FAMILY MEDICINE

## 2020-07-08 PROCEDURE — 99203 OFFICE O/P NEW LOW 30 MIN: CPT | Mod: S$GLB,,, | Performed by: FAMILY MEDICINE

## 2020-07-08 PROCEDURE — 3008F BODY MASS INDEX DOCD: CPT | Mod: CPTII,S$GLB,, | Performed by: FAMILY MEDICINE

## 2020-07-08 PROCEDURE — 99203 PR OFFICE/OUTPT VISIT, NEW, LEVL III, 30-44 MIN: ICD-10-PCS | Mod: S$GLB,,, | Performed by: FAMILY MEDICINE

## 2020-07-08 NOTE — PROGRESS NOTES
Subjective:       Patient ID: Mena Ovalle is a 24 y.o. female.    Chief Complaint: Establish Care    HPI   Patient presents to establish care with a new family doctor.  As an adult she has previously followed with her gyn (Dr. Destiny Odonnell) for all of her routine care and prior to this she was seen at the [facility] in [facility].  She tells me she feels good today and has no current concerns or complaints.  She has a history iron deficiency anemia and follows with hematology within our system for this with recent labs in April of this year showing a normal CMP, essentially normal CBC with only a slightly low hemoglobin at 11.7 and normal iron and TIBC with exception of her iron saturation low at 13.  Questioning her about health maintenance issues she does not believe she has ever had lipid panel screening but tells me she is up-to-date with Tdap vaccination receiving this last year upon being hired at [employer], completed the Gardasil series as a teenager at the [facility] and previously had STD and HIV screening in her teens but is now only long-term committed relationship and has deferred these screenings through her gyn.    Review of Systems   Constitutional: Negative for activity change, appetite change, fatigue and unexpected weight change.   HENT: Negative for congestion, hearing loss, sore throat, trouble swallowing and voice change.    Eyes: Negative for visual disturbance.   Respiratory: Negative for cough, chest tightness, shortness of breath and wheezing.    Cardiovascular: Negative for chest pain, palpitations and leg swelling.   Gastrointestinal: Negative for abdominal pain, blood in stool, constipation, diarrhea, nausea and vomiting.   Genitourinary: Negative for difficulty urinating, dysuria, flank pain, frequency and menstrual problem.   Musculoskeletal: Negative for arthralgias, back pain, gait problem, joint swelling, myalgias, neck pain and neck stiffness.   Skin: Negative  "for rash and wound.   Neurological: Negative for dizziness, tremors, syncope, weakness, light-headedness, numbness and headaches.   Hematological: Negative for adenopathy. Does not bruise/bleed easily.   Psychiatric/Behavioral: Negative for dysphoric mood and sleep disturbance. The patient is not nervous/anxious.          Objective:      Vitals:    07/08/20 0947   BP: 122/84   Pulse: 91   Resp: 18   Temp: 98 °F (36.7 °C)   TempSrc: Oral   SpO2: 97%   Weight: 94.5 kg (208 lb 5.4 oz)   Height: 5' 7" (1.702 m)   PainSc: 0-No pain     Physical Exam  Vitals signs and nursing note reviewed.   Constitutional:       General: She is not in acute distress.     Appearance: She is well-developed. She is not diaphoretic.   HENT:      Head: Normocephalic and atraumatic.   Cardiovascular:      Rate and Rhythm: Normal rate and regular rhythm.      Heart sounds: Normal heart sounds. No murmur. No friction rub. No gallop.    Pulmonary:      Effort: Pulmonary effort is normal. No respiratory distress.      Breath sounds: Normal breath sounds. No wheezing.   Chest:      Chest wall: No tenderness.   Abdominal:      General: Bowel sounds are normal. There is no distension.      Palpations: Abdomen is soft. There is no mass.      Tenderness: There is no abdominal tenderness. There is no guarding or rebound.   Musculoskeletal: Normal range of motion.         General: No tenderness or deformity.   Skin:     General: Skin is warm and dry.   Neurological:      Mental Status: She is alert and oriented to person, place, and time.   Psychiatric:         Behavior: Behavior normal.         Thought Content: Thought content normal.         Judgment: Judgment normal.           Assessment:       1. History of iron deficiency anemia    2. Obesity (BMI 30-39.9)    3. Need for lipid screening          Plan:   History of iron deficiency anemia    Obesity (BMI 30-39.9)    Need for lipid screening  -     Lipid Panel; Future; Expected date: " 07/08/2020      Follow up in about 1 year (around 7/8/2021).    Mena appears to be healthy and doing well currently without use of any medications.  She will continue follow with Hematology for her history of iron deficiency anemia but currently her labs look good.  My biggest concern today is her weight and I discussed her current weight and BMI and chronic health risks of obesity.  She reports significant weight gain with her last pregnancy and has not had a focused effort for weight loss since giving birth.  She reports with her 1st pregnancy she had no difficulty with weight loss using an unknown weight loss medication for brief period of time, focusing on smaller portions and lower calorie options and exercising more with a .  She tells me she has been considering making dietary changes in getting a  again but has no interest in any weight loss medications at this time.  I reviewed her current diet and exercise routine and recommended slowly make changes back to her previous diet and exercise which she has little difficulty losing weight and keeping this off for an extended period of time.  I did offer a quick follow-up appointment to make sure she is able to make these changes and is successful with weight loss but she had no interest in this and I advised her to return sooner than routine yearly follow-up if she has any difficulty with weight loss.  I discussed the need for lipid panel screening and she was interested in this order but was not fasting.  She will be scheduled to complete this in the near future and will call her with results once obtained.  I have asked my nurse to obtain her Gardasil series from the [facility] and she will obtain her Tdap vaccination from [facility] and provide us with a copy to update her maintenance issues.  I did offer STD/HIV screening today but she still has no interest in this.  If she has any interest in this in the future I am  happy to place these orders at any time.

## 2020-07-20 ENCOUNTER — PATIENT MESSAGE (OUTPATIENT)
Dept: FAMILY MEDICINE | Facility: CLINIC | Age: 25
End: 2020-07-20

## 2020-08-05 ENCOUNTER — OFFICE VISIT (OUTPATIENT)
Dept: PRIMARY CARE CLINIC | Facility: CLINIC | Age: 25
End: 2020-08-05
Payer: COMMERCIAL

## 2020-08-05 VITALS
SYSTOLIC BLOOD PRESSURE: 131 MMHG | OXYGEN SATURATION: 99 % | DIASTOLIC BLOOD PRESSURE: 78 MMHG | RESPIRATION RATE: 20 BRPM | TEMPERATURE: 99 F | HEART RATE: 93 BPM

## 2020-08-05 DIAGNOSIS — R50.9 FEVER: ICD-10-CM

## 2020-08-05 DIAGNOSIS — Z20.822 SUSPECTED COVID-19 VIRUS INFECTION: Primary | ICD-10-CM

## 2020-08-05 PROCEDURE — 99203 PR OFFICE/OUTPT VISIT, NEW, LEVL III, 30-44 MIN: ICD-10-PCS | Mod: S$GLB,,, | Performed by: PHYSICIAN ASSISTANT

## 2020-08-05 PROCEDURE — 99203 OFFICE O/P NEW LOW 30 MIN: CPT | Mod: S$GLB,,, | Performed by: PHYSICIAN ASSISTANT

## 2020-08-05 PROCEDURE — U0003 INFECTIOUS AGENT DETECTION BY NUCLEIC ACID (DNA OR RNA); SEVERE ACUTE RESPIRATORY SYNDROME CORONAVIRUS 2 (SARS-COV-2) (CORONAVIRUS DISEASE [COVID-19]), AMPLIFIED PROBE TECHNIQUE, MAKING USE OF HIGH THROUGHPUT TECHNOLOGIES AS DESCRIBED BY CMS-2020-01-R: HCPCS

## 2020-08-05 NOTE — PROGRESS NOTES
Subjective:        Time seen by provider: 8:37 AM on 08/05/2020    Mena Ovalle is a 24 y.o. female who presents for an evaluation of possible COVID-19. The patient c/o cough, congestion, body aches, sweats, and diarrhea. She denies any other symptoms at this time. Patient reports exposure to an individual who recently tested positive for COVID-19. Her children are also experiencing similar symptoms. No pertinent PMHx or PSHx.     Review of Systems   Constitutional: Positive for diaphoresis. Negative for activity change, appetite change, fatigue and fever.   HENT: Positive for congestion. Negative for rhinorrhea and sore throat.    Respiratory: Positive for cough. Negative for chest tightness, shortness of breath and wheezing.    Cardiovascular: Negative for chest pain and palpitations.   Gastrointestinal: Positive for diarrhea. Negative for nausea and vomiting.   Musculoskeletal: Positive for myalgias (generalized). Negative for arthralgias.   Skin: Negative for rash.   Neurological: Negative for weakness, light-headedness, numbness and headaches.       Objective:      Physical Exam  Vitals signs and nursing note reviewed.   Constitutional:       General: She is not in acute distress.     Appearance: She is well-developed. She is not diaphoretic.   HENT:      Head: Normocephalic and atraumatic.      Nose: Nose normal.   Eyes:      Conjunctiva/sclera: Conjunctivae normal.   Neck:      Musculoskeletal: Normal range of motion.   Cardiovascular:      Rate and Rhythm: Normal rate and regular rhythm.      Heart sounds: Normal heart sounds. No murmur.   Pulmonary:      Effort: No respiratory distress.      Breath sounds: Normal breath sounds. No wheezing.   Musculoskeletal: Normal range of motion.   Skin:     General: Skin is warm and dry.   Neurological:      Mental Status: She is alert and oriented to person, place, and time.         Assessment and Plan:      Diagnoses and all orders for this visit:    Suspected  Covid-19 Virus Infection  -     COVID-19 Routine Screening  - Discharge home and await results.   - Return to clinic or ED for new or worsening symptoms.   - Follow-up with PCP as needed.     Scribe Attestation:   I, Pretty Smiley, am scribing for, and in the presence of, Ann Marie Torres PA-C. I performed the above scribed service and the documentation accurately describes the services I performed. I attest to the accuracy of the note.    I, Ann Marie Torres PA-C, personally performed the services described in this documentation. All medical record entries made by the scribe were at my direction and in my presence.  I have reviewed the chart and agree that the record reflects my personal performance and is accurate and complete. Ann Marie Torres PA-C.  12:25 PM 08/05/2020

## 2020-08-06 ENCOUNTER — TELEPHONE (OUTPATIENT)
Dept: EMERGENCY MEDICINE | Facility: HOSPITAL | Age: 25
End: 2020-08-06

## 2020-08-06 LAB — SARS-COV-2 RNA RESP QL NAA+PROBE: NOT DETECTED

## 2020-08-06 NOTE — TELEPHONE ENCOUNTER
Unable to reach patient to notify of COVID result on behalf of Employee [employer] and unable to leave VM. Patient is cleared to RTW tomorrow from a COVID standpoint.

## 2020-08-11 ENCOUNTER — OFFICE VISIT (OUTPATIENT)
Dept: URGENT CARE | Facility: CLINIC | Age: 25
End: 2020-08-11
Payer: COMMERCIAL

## 2020-08-11 VITALS
WEIGHT: 206.38 LBS | BODY MASS INDEX: 32.39 KG/M2 | HEART RATE: 94 BPM | DIASTOLIC BLOOD PRESSURE: 85 MMHG | TEMPERATURE: 98 F | OXYGEN SATURATION: 97 % | SYSTOLIC BLOOD PRESSURE: 118 MMHG | HEIGHT: 67 IN

## 2020-08-11 DIAGNOSIS — J40 BRONCHITIS: Primary | ICD-10-CM

## 2020-08-11 DIAGNOSIS — J01.90 SUBACUTE SINUSITIS, UNSPECIFIED LOCATION: ICD-10-CM

## 2020-08-11 PROCEDURE — 71046 X-RAY EXAM CHEST 2 VIEWS: CPT | Mod: S$GLB,,, | Performed by: NURSE PRACTITIONER

## 2020-08-11 PROCEDURE — 99214 PR OFFICE/OUTPT VISIT, EST, LEVL IV, 30-39 MIN: ICD-10-PCS | Mod: S$GLB,,, | Performed by: NURSE PRACTITIONER

## 2020-08-11 PROCEDURE — 71046 PR XRAY, CHEST, 2 VIEWS: ICD-10-PCS | Mod: S$GLB,,, | Performed by: NURSE PRACTITIONER

## 2020-08-11 PROCEDURE — 99214 OFFICE O/P EST MOD 30 MIN: CPT | Mod: S$GLB,,, | Performed by: NURSE PRACTITIONER

## 2020-08-11 RX ORDER — AMOXICILLIN AND CLAVULANATE POTASSIUM 875; 125 MG/1; MG/1
1 TABLET, FILM COATED ORAL EVERY 12 HOURS
Qty: 14 TABLET | Refills: 0 | Status: SHIPPED | OUTPATIENT
Start: 2020-08-11 | End: 2020-08-18

## 2020-08-11 RX ORDER — GUAIFENESIN 1200 MG/1
1200 TABLET, EXTENDED RELEASE ORAL 2 TIMES DAILY
Qty: 20 TABLET | Refills: 0 | Status: SHIPPED | OUTPATIENT
Start: 2020-08-11 | End: 2020-08-21

## 2020-08-11 RX ORDER — FLUTICASONE PROPIONATE 50 MCG
2 SPRAY, SUSPENSION (ML) NASAL DAILY
Qty: 16 G | Refills: 0 | Status: SHIPPED | OUTPATIENT
Start: 2020-08-11 | End: 2020-11-11 | Stop reason: SDUPTHER

## 2020-08-11 RX ORDER — PREDNISONE 20 MG/1
40 TABLET ORAL DAILY
Qty: 10 TABLET | Refills: 0 | Status: SHIPPED | OUTPATIENT
Start: 2020-08-11 | End: 2020-08-16

## 2020-08-11 NOTE — PROGRESS NOTES
"Subjective:       Patient ID: Mena Ovalle is a 24 y.o. female.    Vitals:  height is 5' 7" (1.702 m) and weight is 93.6 kg (206 lb 6.4 oz). Her oral temperature is 98 °F (36.7 °C). Her blood pressure is 118/85 and her pulse is 94. Her oxygen saturation is 97%.     Chief Complaint: Cough    Patient presents today with complaints of cough, fatigue, ear fullness and sob.  Patient reports that she was tested for COVID on 8/6/2020 and it was negative. Denies f/c/n/v/d. She has tried dayquil/nyquil without relief.     Cough  This is a new problem. The current episode started 1 to 4 weeks ago. The problem has been gradually worsening. The problem occurs constantly. The cough is non-productive. Associated symptoms include ear pain, headaches and shortness of breath. Pertinent negatives include no chills, eye redness, fever, hemoptysis, myalgias, rash, sore throat or wheezing. Associated symptoms comments: Weakness/Fatigue  Lightheadedness  Chest congestion. She has tried OTC cough suppressant (Sudafed PE) for the symptoms. The treatment provided no relief. Her past medical history is significant for bronchitis and pneumonia.       Constitution: Positive for fatigue. Negative for chills, sweating and fever.   HENT: Positive for ear pain, congestion and sinus pressure. Negative for sinus pain, sore throat and voice change.    Neck: Negative for painful lymph nodes.   Eyes: Negative for eye redness.   Respiratory: Positive for cough, sputum production and shortness of breath. Negative for chest tightness, bloody sputum, COPD, stridor, wheezing and asthma.    Gastrointestinal: Negative for nausea, vomiting and diarrhea.   Musculoskeletal: Negative for muscle ache.   Skin: Negative for rash.   Allergic/Immunologic: Negative for seasonal allergies and asthma.   Neurological: Positive for headaches. Negative for dizziness and light-headedness.   Hematologic/Lymphatic: Negative for swollen lymph nodes.       Objective:    "   Physical Exam   Constitutional: She is oriented to person, place, and time. She appears well-developed. She is cooperative.  Non-toxic appearance. She appears ill. No distress.   HENT:   Head: Normocephalic and atraumatic.   Ears:   Right Ear: Hearing, tympanic membrane, external ear and ear canal normal.   Left Ear: Hearing, tympanic membrane, external ear and ear canal normal.   Nose: Mucosal edema present. No rhinorrhea or nasal deformity. No epistaxis. Right sinus exhibits no maxillary sinus tenderness and no frontal sinus tenderness. Left sinus exhibits no maxillary sinus tenderness and no frontal sinus tenderness.   Mouth/Throat: Uvula is midline and mucous membranes are normal. No trismus in the jaw. Normal dentition. No uvula swelling. Posterior oropharyngeal edema and posterior oropharyngeal erythema present. No oropharyngeal exudate.   Eyes: Conjunctivae and lids are normal. No scleral icterus.   Neck: Trachea normal, full passive range of motion without pain and phonation normal. Neck supple. No neck rigidity. No edema and no erythema present.   Cardiovascular: Normal rate, regular rhythm and normal heart sounds.   Pulmonary/Chest: Effort normal and breath sounds normal. No respiratory distress. She has no decreased breath sounds. She has no rhonchi.   Abdominal: Normal appearance.   Musculoskeletal: Normal range of motion.         General: No deformity.   Neurological: She is alert and oriented to person, place, and time. She exhibits normal muscle tone. Coordination normal.   Skin: Skin is warm, dry, intact, not diaphoretic and not pale. Psychiatric: Her speech is normal and behavior is normal. Judgment and thought content normal.   Nursing note and vitals reviewed.        Assessment:       1. Bronchitis    2. Subacute sinusitis, unspecified location        Plan:         Bronchitis  -     XR CHEST PA AND LATERAL; Future; Expected date: 08/11/2020  -     amoxicillin-clavulanate 875-125mg (AUGMENTIN)  875-125 mg per tablet; Take 1 tablet by mouth every 12 (twelve) hours. for 7 days  Dispense: 14 tablet; Refill: 0  -     guaiFENesin 1,200 mg Ta12; Take 1,200 mg by mouth 2 (two) times daily. for 10 days  Dispense: 20 tablet; Refill: 0  -     fluticasone propionate (FLONASE) 50 mcg/actuation nasal spray; 2 sprays (100 mcg total) by Each Nostril route once daily.  Dispense: 16 g; Refill: 0  -     predniSONE (DELTASONE) 20 MG tablet; Take 2 tablets (40 mg total) by mouth once daily. for 5 days  Dispense: 10 tablet; Refill: 0    Subacute sinusitis, unspecified location  -     amoxicillin-clavulanate 875-125mg (AUGMENTIN) 875-125 mg per tablet; Take 1 tablet by mouth every 12 (twelve) hours. for 7 days  Dispense: 14 tablet; Refill: 0  -     guaiFENesin 1,200 mg Ta12; Take 1,200 mg by mouth 2 (two) times daily. for 10 days  Dispense: 20 tablet; Refill: 0  -     fluticasone propionate (FLONASE) 50 mcg/actuation nasal spray; 2 sprays (100 mcg total) by Each Nostril route once daily.  Dispense: 16 g; Refill: 0

## 2020-08-11 NOTE — LETTER
August 11, 2020      [facility]  [address]  [place] [address]  Phone: [phone]       Patient: Mena Ovalle   YOB: 1995  Date of Visit: 08/11/2020    To Whom It May Concern:    Omega Ovalle  was at [facility] on 08/11/2020. She may return to work/school on 08/14/2020 with no restrictions. If you have any questions or concerns, or if I can be of further assistance, please do not hesitate to contact me.    Sincerely,    Sarah Cali MA

## 2020-09-28 NOTE — TELEPHONE ENCOUNTER
Please advise. Pt needing covid testing.   
Ht.   165cm      Wt.   142#      BMI: 23.6         IBW    130#      Pt is at   109 %  IBW

## 2020-11-11 ENCOUNTER — OFFICE VISIT (OUTPATIENT)
Dept: FAMILY MEDICINE | Facility: CLINIC | Age: 25
End: 2020-11-11
Payer: COMMERCIAL

## 2020-11-11 VITALS
BODY MASS INDEX: 32.33 KG/M2 | TEMPERATURE: 98 F | HEART RATE: 86 BPM | SYSTOLIC BLOOD PRESSURE: 118 MMHG | DIASTOLIC BLOOD PRESSURE: 78 MMHG | OXYGEN SATURATION: 98 % | WEIGHT: 206 LBS | HEIGHT: 67 IN

## 2020-11-11 DIAGNOSIS — R53.83 FATIGUE, UNSPECIFIED TYPE: ICD-10-CM

## 2020-11-11 DIAGNOSIS — Z00.00 ANNUAL PHYSICAL EXAM: Primary | ICD-10-CM

## 2020-11-11 DIAGNOSIS — E61.1 IRON DEFICIENCY: ICD-10-CM

## 2020-11-11 DIAGNOSIS — G43.709 CHRONIC MIGRAINE WITHOUT AURA WITHOUT STATUS MIGRAINOSUS, NOT INTRACTABLE: ICD-10-CM

## 2020-11-11 DIAGNOSIS — J01.90 SUBACUTE SINUSITIS, UNSPECIFIED LOCATION: ICD-10-CM

## 2020-11-11 DIAGNOSIS — J40 BRONCHITIS: ICD-10-CM

## 2020-11-11 DIAGNOSIS — T78.40XA ALLERGY, INITIAL ENCOUNTER: ICD-10-CM

## 2020-11-11 PROCEDURE — 99214 PR OFFICE/OUTPT VISIT, EST, LEVL IV, 30-39 MIN: ICD-10-PCS | Mod: S$GLB,,, | Performed by: NURSE PRACTITIONER

## 2020-11-11 PROCEDURE — 3008F BODY MASS INDEX DOCD: CPT | Mod: CPTII,S$GLB,, | Performed by: NURSE PRACTITIONER

## 2020-11-11 PROCEDURE — 3008F PR BODY MASS INDEX (BMI) DOCUMENTED: ICD-10-PCS | Mod: CPTII,S$GLB,, | Performed by: NURSE PRACTITIONER

## 2020-11-11 PROCEDURE — 99214 OFFICE O/P EST MOD 30 MIN: CPT | Mod: S$GLB,,, | Performed by: NURSE PRACTITIONER

## 2020-11-11 RX ORDER — BUTALBITAL, ACETAMINOPHEN AND CAFFEINE 50; 325; 40 MG/1; MG/1; MG/1
1 TABLET ORAL EVERY 6 HOURS PRN
Qty: 60 TABLET | Refills: 1 | Status: SHIPPED | OUTPATIENT
Start: 2020-11-11 | End: 2020-12-11

## 2020-11-11 RX ORDER — CETIRIZINE HYDROCHLORIDE 10 MG/1
10 TABLET ORAL DAILY
Status: ON HOLD | COMMUNITY
End: 2022-02-10

## 2020-11-11 RX ORDER — FLUTICASONE PROPIONATE 50 MCG
1 SPRAY, SUSPENSION (ML) NASAL 2 TIMES DAILY PRN
Qty: 15.58 ML | Refills: 3 | Status: ON HOLD | OUTPATIENT
Start: 2020-11-11 | End: 2022-02-10

## 2020-11-11 RX ORDER — FLUTICASONE PROPIONATE 50 MCG
2 SPRAY, SUSPENSION (ML) NASAL DAILY
Qty: 16 G | Refills: 3 | Status: ON HOLD | OUTPATIENT
Start: 2020-11-11 | End: 2022-02-10

## 2020-11-11 NOTE — PROGRESS NOTES
Subjective:       Patient ID: Mena Ovalle is a 25 y.o. female.    Chief Complaint: Establish Care and Headache (off and on dizziness some)    HPI  Review of Systems   Constitutional:        Vss; bp is well controlled  States has headaches always on right side of head and temple  States allergies have been increasing  Is on zyrtec now  Needs annual labs done  States has fatigue as well  States headaches are not as bad with lights and noise off  Has not been do eye doctor in over a year  Wears glasses  Denies fever denies chills  Denies c/p denies sob       Objective:      Physical Exam  Vitals signs and nursing note reviewed.   Constitutional:       Appearance: Normal appearance. She is normal weight.   HENT:      Head: Normocephalic and atraumatic.      Right Ear: Tympanic membrane, ear canal and external ear normal.      Left Ear: Tympanic membrane, ear canal and external ear normal.      Nose: Nose normal.      Mouth/Throat:      Mouth: Mucous membranes are moist.      Pharynx: Oropharynx is clear.   Eyes:      Pupils: Pupils are equal, round, and reactive to light.   Neck:      Musculoskeletal: Normal range of motion.      Comments: No thrills no bruits  No abnormal neck masses felt  Cardiovascular:      Rate and Rhythm: Normal rate and regular rhythm.      Pulses: Normal pulses.      Heart sounds: Normal heart sounds.      Comments: s1 s2 nsr  No edema on bilateral lower ext  Pulmonary:      Effort: Pulmonary effort is normal.      Breath sounds: Normal breath sounds.   Skin:     General: Skin is warm and dry.   Neurological:      General: No focal deficit present.      Mental Status: She is alert and oriented to person, place, and time. Mental status is at baseline.   Psychiatric:         Mood and Affect: Mood normal.         Behavior: Behavior normal.         Thought Content: Thought content normal.         Judgment: Judgment normal.         Assessment:       1. Annual physical exam    2. Iron  deficiency    3. Fatigue, unspecified type    4. Allergy, initial encounter    5. Chronic migraine without aura without status migrainosus, not intractable    6. Bronchitis    7. Subacute sinusitis, unspecified location        Plan:       Take flonase as ordered  Take firocet as ordered; do not take tylenol when taking fiorcet- use ibuprofen instead  Go to eye doctor to make sure glasses prescription is correct  Keep room dark and quiet  Use antihistamine as well  Get fasting labs done

## 2020-11-23 ENCOUNTER — OFFICE VISIT (OUTPATIENT)
Dept: URGENT CARE | Facility: CLINIC | Age: 25
End: 2020-11-23
Payer: COMMERCIAL

## 2020-11-23 VITALS
DIASTOLIC BLOOD PRESSURE: 78 MMHG | WEIGHT: 209 LBS | HEART RATE: 89 BPM | TEMPERATURE: 99 F | OXYGEN SATURATION: 98 % | HEIGHT: 67 IN | BODY MASS INDEX: 32.8 KG/M2 | SYSTOLIC BLOOD PRESSURE: 113 MMHG | RESPIRATION RATE: 18 BRPM

## 2020-11-23 DIAGNOSIS — H65.192 ACUTE EFFUSION OF LEFT EAR: ICD-10-CM

## 2020-11-23 DIAGNOSIS — H66.92 LEFT OTITIS MEDIA, UNSPECIFIED OTITIS MEDIA TYPE: Primary | ICD-10-CM

## 2020-11-23 PROCEDURE — 99214 OFFICE O/P EST MOD 30 MIN: CPT | Mod: S$GLB,,, | Performed by: NURSE PRACTITIONER

## 2020-11-23 PROCEDURE — 99214 PR OFFICE/OUTPT VISIT, EST, LEVL IV, 30-39 MIN: ICD-10-PCS | Mod: S$GLB,,, | Performed by: NURSE PRACTITIONER

## 2020-11-23 PROCEDURE — 3008F PR BODY MASS INDEX (BMI) DOCUMENTED: ICD-10-PCS | Mod: CPTII,S$GLB,, | Performed by: NURSE PRACTITIONER

## 2020-11-23 PROCEDURE — 3008F BODY MASS INDEX DOCD: CPT | Mod: CPTII,S$GLB,, | Performed by: NURSE PRACTITIONER

## 2020-11-23 RX ORDER — AMOXICILLIN AND CLAVULANATE POTASSIUM 875; 125 MG/1; MG/1
1 TABLET, FILM COATED ORAL 2 TIMES DAILY
Qty: 14 TABLET | Refills: 0 | Status: SHIPPED | OUTPATIENT
Start: 2020-11-23 | End: 2020-11-30

## 2020-11-23 RX ORDER — PSEUDOEPHEDRINE HCL 30 MG
60 TABLET ORAL EVERY 6 HOURS PRN
Qty: 24 TABLET | Refills: 0 | Status: SHIPPED | OUTPATIENT
Start: 2020-11-23 | End: 2021-11-23

## 2020-11-24 NOTE — PROGRESS NOTES
"Subjective:       Patient ID: Mena Ovalle is a 25 y.o. female.    Vitals:  height is 5' 7" (1.702 m) and weight is 94.8 kg (209 lb). Her temperature is 98.8 °F (37.1 °C). Her blood pressure is 113/78 and her pulse is 89. Her respiration is 18 and oxygen saturation is 98%.     Chief Complaint: Ear Fullness    Mena Ovalle is a 25 year old female presenting to the clinic with c/o left ear fullness and decreased hearing. She states that this started when she got home from being on a flight. She has been using nasal spray and zyrtec without relief of symptoms.     Ear Fullness   Associated symptoms include hearing loss. Pertinent negatives include no coughing, diarrhea, headaches, rash, sore throat or vomiting.       Constitution: Negative for chills, fatigue and fever.   HENT: Positive for ear pain and hearing loss. Negative for congestion and sore throat.    Neck: Negative for painful lymph nodes.   Cardiovascular: Negative for chest pain and leg swelling.   Eyes: Negative for double vision and blurred vision.   Respiratory: Negative for cough and shortness of breath.    Gastrointestinal: Negative for nausea, vomiting and diarrhea.   Genitourinary: Negative for dysuria, frequency, urgency and history of kidney stones.   Musculoskeletal: Negative for joint pain, joint swelling, muscle cramps and muscle ache.   Skin: Negative for color change, pale, rash and bruising.   Allergic/Immunologic: Negative for seasonal allergies.   Neurological: Negative for dizziness, history of vertigo, light-headedness, passing out and headaches.   Hematologic/Lymphatic: Negative for swollen lymph nodes.   Psychiatric/Behavioral: Negative for nervous/anxious, sleep disturbance and depression. The patient is not nervous/anxious.        Objective:      Physical Exam   Constitutional: She is oriented to person, place, and time. She appears well-developed. She is cooperative.  Non-toxic appearance. She does not appear ill. No " distress.   HENT:   Head: Normocephalic and atraumatic.   Ears:   Right Ear: Hearing, tympanic membrane, external ear and ear canal normal.   Left Ear: Hearing, external ear and ear canal normal. Tympanic membrane is bulging. A middle ear effusion is present.   Nose: Nose normal. No mucosal edema, rhinorrhea or nasal deformity. No epistaxis. Right sinus exhibits no maxillary sinus tenderness and no frontal sinus tenderness. Left sinus exhibits no maxillary sinus tenderness and no frontal sinus tenderness.   Mouth/Throat: Uvula is midline, oropharynx is clear and moist and mucous membranes are normal. No trismus in the jaw. Normal dentition. No uvula swelling. No posterior oropharyngeal erythema.   Eyes: Conjunctivae and lids are normal. Right eye exhibits no discharge. Left eye exhibits no discharge. No scleral icterus.   Neck: Trachea normal, normal range of motion, full passive range of motion without pain and phonation normal. Neck supple.   Cardiovascular: Normal rate, regular rhythm, normal heart sounds and normal pulses.   Pulmonary/Chest: Effort normal and breath sounds normal. No respiratory distress.   Abdominal: Soft. Normal appearance and bowel sounds are normal. She exhibits no distension, no pulsatile midline mass and no mass. There is no abdominal tenderness.   Musculoskeletal: Normal range of motion.         General: No deformity.   Neurological: She is alert and oriented to person, place, and time. She exhibits normal muscle tone. Coordination normal.   Skin: Skin is warm, dry, intact, not diaphoretic and not pale. Psychiatric: Her speech is normal and behavior is normal. Judgment and thought content normal.   Nursing note and vitals reviewed.        Assessment:       1. Left otitis media, unspecified otitis media type    2. Acute effusion of left ear        Plan:       Patient appears to have left AOM with no evidence of TM perforation. No mastoid tenderness to suggest mastoiditis. Will start  sudafed, augmentin, continue zyrtec and flonase.   Follow up as needed.     Left otitis media, unspecified otitis media type    Acute effusion of left ear    Other orders  -     pseudoephedrine (SUDAFED) 30 MG tablet; Take 2 tablets (60 mg total) by mouth every 6 (six) hours as needed for Congestion.  Dispense: 24 tablet; Refill: 0  -     amoxicillin-clavulanate 875-125mg (AUGMENTIN) 875-125 mg per tablet; Take 1 tablet by mouth 2 (two) times daily. for 7 days  Dispense: 14 tablet; Refill: 0

## 2020-11-24 NOTE — PATIENT INSTRUCTIONS
Middle Ear Infection (Adult)  You have an infection of the middle ear, the space behind the eardrum. This is also called acute otitis media (AOM). Sometimes it is caused by the common cold. This is because congestion can block the internal passage (eustachian tube) that drains fluid from the middle ear. When the middle ear fills with fluid, bacteria can grow there and cause an infection. Oral antibiotics are used to treat this illness, not ear drops. Symptoms usually start to improve within 1 to 2 days of treatment.    Home care  The following are general care guidelines:  · Finish all of the antibiotic medicine given, even though you may feel better after the first few days.  · You may use over-the-counter medicine, such as acetaminophen or ibuprofen, to control pain and fever, unless something else was prescribed. If you have chronic liver or kidney disease or have ever had a stomach ulcer or gastrointestinal bleeding, talk with your healthcare provider before using these medicines. Do not give aspirin to anyone under 18 years of age who has a fever. It may cause severe illness or death.  Follow-up care  Follow up with your healthcare provider, or as advised, in 2 weeks if all symptoms have not gotten better, or if hearing doesn't go back to normal within 1 month.  When to seek medical advice  Call your healthcare provider right away if any of these occur:  · Ear pain gets worse or does not improve after 3 days of treatment  · Unusual drowsiness or confusion  · Neck pain, stiff neck, or headache  · Fluid or blood draining from the ear canal  · Fever of 100.4°F (38°C) or as advised   · Seizure  Date Last Reviewed: 6/1/2016  © 4210-8318 [employer]. [address] [place] [address], [place] [address]. All rights reserved. This information is not intended as a substitute for professional medical care. Always follow your healthcare professional's instructions.

## 2020-12-18 ENCOUNTER — IMMUNIZATION (OUTPATIENT)
Dept: PRIMARY CARE CLINIC | Facility: CLINIC | Age: 25
End: 2020-12-18
Payer: COMMERCIAL

## 2020-12-18 DIAGNOSIS — Z23 NEED FOR VACCINATION: ICD-10-CM

## 2020-12-18 PROCEDURE — 91300 COVID-19, MRNA, LNP-S, PF, 30 MCG/0.3 ML DOSE VACCINE: CPT | Mod: S$GLB,,, | Performed by: FAMILY MEDICINE

## 2020-12-18 PROCEDURE — 0001A COVID-19, MRNA, LNP-S, PF, 30 MCG/0.3 ML DOSE VACCINE: ICD-10-PCS | Mod: CV19,S$GLB,, | Performed by: FAMILY MEDICINE

## 2020-12-18 PROCEDURE — 0001A COVID-19, MRNA, LNP-S, PF, 30 MCG/0.3 ML DOSE VACCINE: CPT | Mod: CV19,S$GLB,, | Performed by: FAMILY MEDICINE

## 2020-12-18 PROCEDURE — 91300 COVID-19, MRNA, LNP-S, PF, 30 MCG/0.3 ML DOSE VACCINE: ICD-10-PCS | Mod: S$GLB,,, | Performed by: FAMILY MEDICINE

## 2021-01-06 ENCOUNTER — PATIENT MESSAGE (OUTPATIENT)
Dept: OBSTETRICS AND GYNECOLOGY | Facility: CLINIC | Age: 26
End: 2021-01-06

## 2021-01-08 ENCOUNTER — IMMUNIZATION (OUTPATIENT)
Dept: PRIMARY CARE CLINIC | Facility: CLINIC | Age: 26
End: 2021-01-08
Payer: COMMERCIAL

## 2021-01-08 DIAGNOSIS — Z23 NEED FOR VACCINATION: ICD-10-CM

## 2021-01-08 PROCEDURE — 0002A COVID-19, MRNA, LNP-S, PF, 30 MCG/0.3 ML DOSE VACCINE: CPT | Mod: S$GLB,,, | Performed by: FAMILY MEDICINE

## 2021-01-08 PROCEDURE — 0002A COVID-19, MRNA, LNP-S, PF, 30 MCG/0.3 ML DOSE VACCINE: ICD-10-PCS | Mod: S$GLB,,, | Performed by: FAMILY MEDICINE

## 2021-01-08 PROCEDURE — 91300 COVID-19, MRNA, LNP-S, PF, 30 MCG/0.3 ML DOSE VACCINE: CPT | Mod: S$GLB,,, | Performed by: FAMILY MEDICINE

## 2021-01-08 PROCEDURE — 91300 COVID-19, MRNA, LNP-S, PF, 30 MCG/0.3 ML DOSE VACCINE: ICD-10-PCS | Mod: S$GLB,,, | Performed by: FAMILY MEDICINE

## 2021-03-12 LAB
PAP RECOMMENDATION EXT: NORMAL
PAP SMEAR: NORMAL

## 2021-06-25 ENCOUNTER — HOSPITAL ENCOUNTER (EMERGENCY)
Facility: HOSPITAL | Age: 26
Discharge: HOME | End: 2021-06-25
Attending: EMERGENCY MEDICINE
Payer: COMMERCIAL

## 2021-06-25 VITALS
DIASTOLIC BLOOD PRESSURE: 64 MMHG | SYSTOLIC BLOOD PRESSURE: 105 MMHG | TEMPERATURE: 98 F | RESPIRATION RATE: 15 BRPM | HEIGHT: 67 IN | WEIGHT: 185 LBS | OXYGEN SATURATION: 100 % | HEART RATE: 86 BPM | BODY MASS INDEX: 29.03 KG/M2

## 2021-06-25 DIAGNOSIS — O20.0 THREATENED MISCARRIAGE: Primary | ICD-10-CM

## 2021-06-25 LAB
ABO + RH BLD: NORMAL
ALBUMIN SERPL BCP-MCNC: 4.2 G/DL (ref 3.5–5.2)
ALP SERPL-CCNC: 49 U/L (ref 55–135)
ALT SERPL W/O P-5'-P-CCNC: 21 U/L (ref 10–44)
ANION GAP SERPL CALC-SCNC: 12 MMOL/L (ref 8–16)
AST SERPL-CCNC: 23 U/L (ref 10–40)
B-HCG UR QL: POSITIVE
BACTERIA #/AREA URNS HPF: ABNORMAL /HPF
BASOPHILS # BLD AUTO: 0.04 K/UL (ref 0–0.2)
BASOPHILS NFR BLD: 0.4 % (ref 0–1.9)
BILIRUB SERPL-MCNC: 0.6 MG/DL (ref 0.1–1)
BILIRUB UR QL STRIP: NEGATIVE
BUN SERPL-MCNC: 7 MG/DL (ref 6–20)
C TRACH RRNA SPEC QL PROBE: NEGATIVE
CALCIUM SERPL-MCNC: 8.8 MG/DL (ref 8.7–10.5)
CHLORIDE SERPL-SCNC: 104 MMOL/L (ref 95–110)
CLARITY UR: CLEAR
CO2 SERPL-SCNC: 24 MMOL/L (ref 23–29)
COLOR UR: YELLOW
CREAT SERPL-MCNC: 0.6 MG/DL (ref 0.5–1.4)
CTP QC/QA: YES
DIFFERENTIAL METHOD: ABNORMAL
EOSINOPHIL # BLD AUTO: 0.2 K/UL (ref 0–0.5)
EOSINOPHIL NFR BLD: 1.7 % (ref 0–8)
ERYTHROCYTE [DISTWIDTH] IN BLOOD BY AUTOMATED COUNT: 13.2 % (ref 11.5–14.5)
EST. GFR  (AFRICAN AMERICAN): >60 ML/MIN/1.73 M^2
EST. GFR  (NON AFRICAN AMERICAN): >60 ML/MIN/1.73 M^2
GLUCOSE SERPL-MCNC: 97 MG/DL (ref 70–110)
GLUCOSE UR QL STRIP: NEGATIVE
HBV SURFACE AG SERPL QL IA: NEGATIVE
HCG INTACT+B SERPL-ACNC: 3360 MIU/ML
HCT VFR BLD AUTO: 35.5 % (ref 37–48.5)
HGB BLD-MCNC: 11.6 G/DL (ref 12–16)
HGB UR QL STRIP: ABNORMAL
HIV 1+2 AB+HIV1 P24 AG SERPL QL IA: NEGATIVE
HYALINE CASTS #/AREA URNS LPF: 25 /LPF
IMM GRANULOCYTES # BLD AUTO: 0.03 K/UL (ref 0–0.04)
IMM GRANULOCYTES NFR BLD AUTO: 0.3 % (ref 0–0.5)
INDIRECT COOMBS: NEGATIVE
KETONES UR QL STRIP: ABNORMAL
LEUKOCYTE ESTERASE UR QL STRIP: ABNORMAL
LYMPHOCYTES # BLD AUTO: 3.3 K/UL (ref 1–4.8)
LYMPHOCYTES NFR BLD: 31.8 % (ref 18–48)
MCH RBC QN AUTO: 28.2 PG (ref 27–31)
MCHC RBC AUTO-ENTMCNC: 32.7 G/DL (ref 32–36)
MCV RBC AUTO: 86 FL (ref 82–98)
MICROSCOPIC COMMENT: ABNORMAL
MONOCYTES # BLD AUTO: 0.9 K/UL (ref 0.3–1)
MONOCYTES NFR BLD: 8.5 % (ref 4–15)
N GONORRHOEAE, AMPLIFIED DNA: NEGATIVE
NEUTROPHILS # BLD AUTO: 5.9 K/UL (ref 1.8–7.7)
NEUTROPHILS NFR BLD: 57.3 % (ref 38–73)
NITRITE UR QL STRIP: NEGATIVE
NRBC BLD-RTO: 0 /100 WBC
PH UR STRIP: 6 [PH] (ref 5–8)
PLATELET # BLD AUTO: 353 K/UL (ref 150–450)
PMV BLD AUTO: 11.1 FL (ref 9.2–12.9)
POTASSIUM SERPL-SCNC: 4 MMOL/L (ref 3.5–5.1)
PROT SERPL-MCNC: 7.7 G/DL (ref 6–8.4)
PROT UR QL STRIP: ABNORMAL
RBC # BLD AUTO: 4.12 M/UL (ref 4–5.4)
RBC #/AREA URNS HPF: 60 /HPF (ref 0–4)
RPR: NORMAL
RUBELLA IMMUNE STATUS: NORMAL
SODIUM SERPL-SCNC: 140 MMOL/L (ref 136–145)
SP GR UR STRIP: >1.03 (ref 1–1.03)
SQUAMOUS #/AREA URNS HPF: 5 /HPF
URN SPEC COLLECT METH UR: ABNORMAL
UROBILINOGEN UR STRIP-ACNC: ABNORMAL EU/DL
WBC # BLD AUTO: 10.26 K/UL (ref 3.9–12.7)
WBC #/AREA URNS HPF: 6 /HPF (ref 0–5)

## 2021-06-25 PROCEDURE — 84702 CHORIONIC GONADOTROPIN TEST: CPT | Performed by: EMERGENCY MEDICINE

## 2021-06-25 PROCEDURE — 81025 URINE PREGNANCY TEST: CPT | Performed by: EMERGENCY MEDICINE

## 2021-06-25 PROCEDURE — 99284 EMERGENCY DEPT VISIT MOD MDM: CPT | Mod: 25

## 2021-06-25 PROCEDURE — 81001 URINALYSIS AUTO W/SCOPE: CPT | Performed by: EMERGENCY MEDICINE

## 2021-06-25 PROCEDURE — 80053 COMPREHEN METABOLIC PANEL: CPT | Performed by: EMERGENCY MEDICINE

## 2021-06-25 PROCEDURE — 96360 HYDRATION IV INFUSION INIT: CPT

## 2021-06-25 PROCEDURE — 85025 COMPLETE CBC W/AUTO DIFF WBC: CPT | Performed by: EMERGENCY MEDICINE

## 2021-06-25 PROCEDURE — 86900 BLOOD TYPING SEROLOGIC ABO: CPT | Performed by: EMERGENCY MEDICINE

## 2021-06-25 PROCEDURE — 25000003 PHARM REV CODE 250: Performed by: EMERGENCY MEDICINE

## 2021-06-25 RX ORDER — PROMETHAZINE HYDROCHLORIDE 25 MG/1
25 TABLET ORAL EVERY 4 HOURS
Status: ON HOLD | COMMUNITY
End: 2022-02-19

## 2021-06-25 RX ADMIN — SODIUM CHLORIDE 1000 ML: 0.9 INJECTION, SOLUTION INTRAVENOUS at 06:06

## 2021-09-15 NOTE — TELEPHONE ENCOUNTER
Scheduled patient for virtual visit tomorrow @ 10 AM also rescheduled 2 othe Monday appointments    Yes

## 2021-12-28 ENCOUNTER — HOSPITAL ENCOUNTER (OUTPATIENT)
Facility: HOSPITAL | Age: 26
Discharge: HOME | End: 2021-12-28
Attending: SPECIALIST | Admitting: SPECIALIST
Payer: COMMERCIAL

## 2021-12-28 VITALS
DIASTOLIC BLOOD PRESSURE: 68 MMHG | SYSTOLIC BLOOD PRESSURE: 120 MMHG | HEART RATE: 93 BPM | OXYGEN SATURATION: 98 % | RESPIRATION RATE: 16 BRPM | TEMPERATURE: 98 F

## 2021-12-28 DIAGNOSIS — O26.899 ABDOMINAL PAIN IN PREGNANCY: ICD-10-CM

## 2021-12-28 DIAGNOSIS — R10.9 ABDOMINAL PAIN IN PREGNANCY: ICD-10-CM

## 2021-12-28 LAB
BILIRUB UR QL STRIP: NEGATIVE
CLARITY UR: CLEAR
COLOR UR: YELLOW
GLUCOSE UR QL STRIP: NEGATIVE
HGB UR QL STRIP: NEGATIVE
KETONES UR QL STRIP: ABNORMAL
LEUKOCYTE ESTERASE UR QL STRIP: NEGATIVE
NITRITE UR QL STRIP: NEGATIVE
PH UR STRIP: 8 [PH] (ref 5–8)
PROT UR QL STRIP: NEGATIVE
SP GR UR STRIP: 1.01 (ref 1–1.03)
URN SPEC COLLECT METH UR: ABNORMAL
UROBILINOGEN UR STRIP-ACNC: NEGATIVE EU/DL

## 2021-12-28 PROCEDURE — 99211 OFF/OP EST MAY X REQ PHY/QHP: CPT

## 2021-12-28 PROCEDURE — 81003 URINALYSIS AUTO W/O SCOPE: CPT | Performed by: OBSTETRICS & GYNECOLOGY

## 2022-01-01 ENCOUNTER — HOSPITAL ENCOUNTER (OUTPATIENT)
Facility: HOSPITAL | Age: 27
Discharge: HOME | End: 2022-01-01
Attending: SPECIALIST | Admitting: OBSTETRICS & GYNECOLOGY
Payer: MEDICAID

## 2022-01-01 VITALS
HEIGHT: 67 IN | WEIGHT: 206 LBS | HEART RATE: 75 BPM | TEMPERATURE: 98 F | DIASTOLIC BLOOD PRESSURE: 68 MMHG | RESPIRATION RATE: 18 BRPM | SYSTOLIC BLOOD PRESSURE: 113 MMHG | BODY MASS INDEX: 32.33 KG/M2 | OXYGEN SATURATION: 99 %

## 2022-01-01 DIAGNOSIS — M54.9 BACK PAIN AFFECTING PREGNANCY: ICD-10-CM

## 2022-01-01 DIAGNOSIS — O99.891 BACK PAIN AFFECTING PREGNANCY: ICD-10-CM

## 2022-01-01 LAB
BACTERIA #/AREA URNS HPF: NEGATIVE /HPF
BILIRUB UR QL STRIP: NEGATIVE
CLARITY UR: CLEAR
COLOR UR: YELLOW
GLUCOSE UR QL STRIP: NEGATIVE
HGB UR QL STRIP: NEGATIVE
HYALINE CASTS #/AREA URNS LPF: 5 /LPF
KETONES UR QL STRIP: ABNORMAL
LEUKOCYTE ESTERASE UR QL STRIP: NEGATIVE
MICROSCOPIC COMMENT: ABNORMAL
NITRITE UR QL STRIP: NEGATIVE
PH UR STRIP: 8 [PH] (ref 5–8)
PROT UR QL STRIP: ABNORMAL
RBC #/AREA URNS HPF: 7 /HPF (ref 0–4)
SP GR UR STRIP: 1.02 (ref 1–1.03)
SQUAMOUS #/AREA URNS HPF: 2 /HPF
URN SPEC COLLECT METH UR: ABNORMAL
UROBILINOGEN UR STRIP-ACNC: ABNORMAL EU/DL
WBC #/AREA URNS HPF: 1 /HPF (ref 0–5)

## 2022-01-01 PROCEDURE — 99211 OFF/OP EST MAY X REQ PHY/QHP: CPT

## 2022-01-01 PROCEDURE — 81001 URINALYSIS AUTO W/SCOPE: CPT | Performed by: OBSTETRICS & GYNECOLOGY

## 2022-01-01 NOTE — PROGRESS NOTES
, 32.2wks; Pt presented c/o right flank pain, radiating down to right hip & coming into front round ligament area. Pt denies any other issues/complaints. No ctxs noted on palpation, nor on monitor. Urine sample visibly concentrated; pt educated on hydration importance. Dr. Maurer notified, MD ordered UA with reflex Urine Culture. UA results sent to MD; MD ordered D/C with RX for Macrobid BID for 7 days. Pt provided with D/C instructions, RX, and educated on labor precautions. NADN. Pt D/C'ed.

## 2022-01-10 ENCOUNTER — HOSPITAL ENCOUNTER (OUTPATIENT)
Facility: HOSPITAL | Age: 27
Discharge: HOME | End: 2022-01-10
Attending: SPECIALIST | Admitting: SPECIALIST
Payer: MEDICAID

## 2022-01-10 VITALS — HEART RATE: 80 BPM | SYSTOLIC BLOOD PRESSURE: 109 MMHG | DIASTOLIC BLOOD PRESSURE: 69 MMHG

## 2022-01-10 DIAGNOSIS — O36.5990 IUGR, ANTENATAL: ICD-10-CM

## 2022-01-10 PROCEDURE — 59025 FETAL NON-STRESS TEST: CPT

## 2022-01-13 ENCOUNTER — HOSPITAL ENCOUNTER (OUTPATIENT)
Facility: HOSPITAL | Age: 27
Discharge: HOME | End: 2022-01-13
Attending: SPECIALIST | Admitting: SPECIALIST
Payer: MEDICAID

## 2022-01-13 VITALS — DIASTOLIC BLOOD PRESSURE: 65 MMHG | SYSTOLIC BLOOD PRESSURE: 124 MMHG

## 2022-01-13 DIAGNOSIS — O36.5990 IUGR (INTRAUTERINE GROWTH RESTRICTION) AFFECTING CARE OF MOTHER: ICD-10-CM

## 2022-01-13 PROCEDURE — 59025 FETAL NON-STRESS TEST: CPT

## 2022-01-14 ENCOUNTER — PATIENT MESSAGE (OUTPATIENT)
Dept: INTERNAL MEDICINE | Facility: CLINIC | Age: 27
End: 2022-01-14
Payer: MEDICAID

## 2022-01-14 ENCOUNTER — CLINICAL SUPPORT (OUTPATIENT)
Dept: PEDIATRICS | Facility: CLINIC | Age: 27
End: 2022-01-14
Payer: MEDICAID

## 2022-01-14 DIAGNOSIS — U07.1 COVID-19 VIRUS DETECTED: ICD-10-CM

## 2022-01-14 DIAGNOSIS — R05.9 COUGH: ICD-10-CM

## 2022-01-14 DIAGNOSIS — R05.9 COUGH: Primary | ICD-10-CM

## 2022-01-14 LAB
CTP QC/QA: YES
SARS-COV-2 RDRP RESP QL NAA+PROBE: POSITIVE

## 2022-01-14 PROCEDURE — U0002 COVID-19 LAB TEST NON-CDC: HCPCS | Mod: PBBFAC,PO

## 2022-01-17 ENCOUNTER — HOSPITAL ENCOUNTER (OUTPATIENT)
Facility: HOSPITAL | Age: 27
Discharge: HOME | End: 2022-01-17
Attending: SPECIALIST | Admitting: SPECIALIST
Payer: MEDICAID

## 2022-01-17 DIAGNOSIS — O36.5990 IUGR (INTRAUTERINE GROWTH RESTRICTION) AFFECTING CARE OF MOTHER: ICD-10-CM

## 2022-01-17 PROCEDURE — 59025 FETAL NON-STRESS TEST: CPT

## 2022-01-17 NOTE — DISCHARGE INSTRUCTIONS
Keep your scheduled appointment with your provider.    Call your Doctor if you have any of the following:  Temperature above 100 degrees  Nausea, vomiting and/or diarrhea  Severe headache, dizziness, or blurred vision  Notable increase in swelling of hands or feet  Notable swelling of face and lips  Difficulty, pain or burning with urination  Foul smelling vaginal discharge  Decreased fetal movement    Come to the hospital if you have any of the following symptoms:  Your water breaks  More than 4-6 contractions in 1 hour for 2 or more hours  Vaginal bleeding like a period    After 28 weeks, you should feel 10 distinct fetal movements within a 2 hour period.    It is recommended that you drink 1/2 a gallon of water each day.  Tea, Soda and Juice are  in addition to this.        Patient Education       Fetal Movement   About this topic   Feeling your baby move for the first time is a good sign that your baby is doing well. You may begin to feel these movements between the 18th and 25th weeks of your pregnancy. If this is your first time being pregnant, it may be closer to 25 weeks. Your baby has been moving around before this, but the kicks have not been strong enough for you to feel. During the first weeks of feeling movement, you may start to see a pattern during the day when your baby is most active. You can track your baby's kicks each day at home. This is also known as kick counting. It is a good way to check on your baby's movements and well being.  Most often, fetal kick counting is used in high-risk pregnancies. It may be useful for all pregnancies. Counting and writing down your baby's kicks, jabs, twists, flutters, rolls, turns, flips, and swishes may help find a problem that needs more evaluation. The American College of Obstetricians and Gynecologists, or ACOG, suggests that you record how much time it takes you to feel 10 of these movements. Ideally, you should be able to feel 10 movements within 2 hours.  "Many people will track these movements in much less time.  General   How to Track Your Baby's Kick Counts   Most often your doctor will want you to wait until the 28th to 30th weeks of your pregnancy to start kick counting. Here are some tips to help you get started.  · Find the time of day when your baby is most active. For some people, this is right after eating. Others find their baby moving a lot after they have been exercising or more active. Some babies are more active in the evenings when your blood sugar starts to lower.  · Try to count kicks at about the same time each day.  · Before you start counting, have something to eat or drink. Also take a short walk or do some light activity.  · Choose a quiet place where you can focus on your baby's movements. Also get in a comfortable position. Try and lie on one side or the other. You may need to change positions until you find one that works best for you and your baby.  · Keep a notebook to track your baby's kicks. Your doctor may give you a chart to use or you can make your own. Write down the date, time you started counting, and the time of each "kick" during a 2-hour period until you have felt 10 kicks.  · Once you have recorded 10 kicks within 2 hours you can stop counting.  · If you are not able to record 10 movements over 2 hours you should get up and move around or eat something and try again.  · If you are not able to record 10 movements over 2 hours the second time, call your doctor. They may want you to go to the hospital to get your baby checked.  When do I need to call the doctor?   · You have felt less than 10 movements over a period of 2 hours.  · It takes longer each day to record 10 movements.  · There is a big change in the pattern of movements you are writing down.  · You feel no movement for 2 hours even after eating a snack, light activity, and position changes.  Teach Back: Helping You Understand   The Teach Back Method helps you understand " the information we are giving you. After you talk with the staff, tell them in your own words what you learned. This helps to make sure the staff has described each thing clearly. It also helps to explain things that may have been confusing. Before going home, make sure you can do these:  · I can tell you about feeling my baby move.  · I can tell you how I will track my babys kicks.  · I can tell you what I will do if I feel less than 10 movements in 2 hours, it takes longer to feel my baby move 10 times, or there is a big change in how my baby is moving.  Last Reviewed Date   2021-10-08  Consumer Information Use and Disclaimer   This information is not specific medical advice and does not replace information you receive from your health care provider. This is only a brief summary of general information. It does NOT include all information about conditions, illnesses, injuries, tests, procedures, treatments, therapies, discharge instructions or life-style choices that may apply to you. You must talk with your health care provider for complete information about your health and treatment options. This information should not be used to decide whether or not to accept your health care providers advice, instructions or recommendations. Only your health care provider has the knowledge and training to provide advice that is right for you.  Copyright   Copyright © 2021 [employer] and its affiliates and/or licensors. All rights reserved.

## 2022-01-20 ENCOUNTER — HOSPITAL ENCOUNTER (OUTPATIENT)
Facility: HOSPITAL | Age: 27
Discharge: HOME | End: 2022-01-20
Attending: SPECIALIST | Admitting: SPECIALIST
Payer: MEDICAID

## 2022-01-20 VITALS — SYSTOLIC BLOOD PRESSURE: 117 MMHG | DIASTOLIC BLOOD PRESSURE: 71 MMHG | RESPIRATION RATE: 18 BRPM | HEART RATE: 90 BPM

## 2022-01-20 DIAGNOSIS — O36.5990 IUGR (INTRAUTERINE GROWTH RESTRICTION) AFFECTING CARE OF MOTHER: ICD-10-CM

## 2022-01-20 PROCEDURE — 59025 FETAL NON-STRESS TEST: CPT

## 2022-01-24 ENCOUNTER — HOSPITAL ENCOUNTER (OUTPATIENT)
Facility: HOSPITAL | Age: 27
Discharge: HOME | End: 2022-01-24
Attending: SPECIALIST | Admitting: SPECIALIST
Payer: MEDICAID

## 2022-01-24 VITALS — SYSTOLIC BLOOD PRESSURE: 120 MMHG | HEART RATE: 96 BPM | DIASTOLIC BLOOD PRESSURE: 71 MMHG

## 2022-01-24 DIAGNOSIS — O36.5990 IUGR (INTRAUTERINE GROWTH RESTRICTION) AFFECTING CARE OF MOTHER: ICD-10-CM

## 2022-01-24 PROCEDURE — 59025 FETAL NON-STRESS TEST: CPT

## 2022-01-25 ENCOUNTER — HOSPITAL ENCOUNTER (OUTPATIENT)
Facility: HOSPITAL | Age: 27
Discharge: HOME | End: 2022-01-25
Attending: SPECIALIST | Admitting: SPECIALIST
Payer: MEDICAID

## 2022-01-25 VITALS — SYSTOLIC BLOOD PRESSURE: 112 MMHG | HEART RATE: 87 BPM | DIASTOLIC BLOOD PRESSURE: 70 MMHG

## 2022-01-25 DIAGNOSIS — O36.8190 DECREASED FETAL MOVEMENT: ICD-10-CM

## 2022-01-25 PROCEDURE — 59025 FETAL NON-STRESS TEST: CPT

## 2022-01-26 LAB — PRENATAL STREP B CULTURE: NEGATIVE

## 2022-01-27 ENCOUNTER — HOSPITAL ENCOUNTER (OUTPATIENT)
Facility: HOSPITAL | Age: 27
Discharge: HOME | End: 2022-01-27
Attending: SPECIALIST | Admitting: SPECIALIST
Payer: MEDICAID

## 2022-01-27 VITALS — HEART RATE: 86 BPM | SYSTOLIC BLOOD PRESSURE: 123 MMHG | DIASTOLIC BLOOD PRESSURE: 73 MMHG | RESPIRATION RATE: 18 BRPM

## 2022-01-27 DIAGNOSIS — O36.5990 IUGR (INTRAUTERINE GROWTH RESTRICTION) AFFECTING CARE OF MOTHER: ICD-10-CM

## 2022-01-27 PROCEDURE — 59025 FETAL NON-STRESS TEST: CPT

## 2022-01-31 ENCOUNTER — HOSPITAL ENCOUNTER (OUTPATIENT)
Facility: HOSPITAL | Age: 27
Discharge: HOME | End: 2022-01-31
Attending: SPECIALIST | Admitting: SPECIALIST
Payer: MEDICAID

## 2022-01-31 VITALS — HEART RATE: 86 BPM | RESPIRATION RATE: 18 BRPM | DIASTOLIC BLOOD PRESSURE: 68 MMHG | SYSTOLIC BLOOD PRESSURE: 118 MMHG

## 2022-01-31 DIAGNOSIS — O36.5990 INTRAUTERINE GROWTH RESTRICTION (IUGR) AFFECTING CARE OF MOTHER: ICD-10-CM

## 2022-01-31 PROCEDURE — 59025 FETAL NON-STRESS TEST: CPT

## 2022-02-03 ENCOUNTER — HOSPITAL ENCOUNTER (OUTPATIENT)
Facility: HOSPITAL | Age: 27
Discharge: HOME | End: 2022-02-03
Attending: SPECIALIST | Admitting: SPECIALIST
Payer: MEDICAID

## 2022-02-03 DIAGNOSIS — O36.5990 INTRAUTERINE GROWTH RESTRICTION (IUGR) AFFECTING CARE OF MOTHER: ICD-10-CM

## 2022-02-03 PROCEDURE — 59025 FETAL NON-STRESS TEST: CPT

## 2022-02-07 ENCOUNTER — HOSPITAL ENCOUNTER (OUTPATIENT)
Facility: HOSPITAL | Age: 27
Discharge: HOME | End: 2022-02-07
Attending: SPECIALIST | Admitting: SPECIALIST
Payer: MEDICAID

## 2022-02-07 VITALS — SYSTOLIC BLOOD PRESSURE: 117 MMHG | HEART RATE: 83 BPM | DIASTOLIC BLOOD PRESSURE: 70 MMHG

## 2022-02-07 DIAGNOSIS — O36.5990 IUGR (INTRAUTERINE GROWTH RESTRICTION) AFFECTING CARE OF MOTHER: ICD-10-CM

## 2022-02-07 PROCEDURE — 59025 FETAL NON-STRESS TEST: CPT

## 2022-02-10 ENCOUNTER — HOSPITAL ENCOUNTER (OUTPATIENT)
Facility: HOSPITAL | Age: 27
Discharge: HOME | End: 2022-02-10
Attending: SPECIALIST | Admitting: SPECIALIST
Payer: MEDICAID

## 2022-02-10 VITALS — HEART RATE: 90 BPM | DIASTOLIC BLOOD PRESSURE: 68 MMHG | RESPIRATION RATE: 18 BRPM | SYSTOLIC BLOOD PRESSURE: 109 MMHG

## 2022-02-10 DIAGNOSIS — O99.891 BACK PAIN IN PREGNANCY: ICD-10-CM

## 2022-02-10 DIAGNOSIS — M54.9 BACK PAIN IN PREGNANCY: ICD-10-CM

## 2022-02-10 LAB
BACTERIA #/AREA URNS HPF: ABNORMAL /HPF
BILIRUB UR QL STRIP: NEGATIVE
CLARITY UR: CLEAR
COLOR UR: YELLOW
GLUCOSE UR QL STRIP: NEGATIVE
HGB UR QL STRIP: ABNORMAL
HYALINE CASTS #/AREA URNS LPF: 18 /LPF
KETONES UR QL STRIP: ABNORMAL
LEUKOCYTE ESTERASE UR QL STRIP: ABNORMAL
MICROSCOPIC COMMENT: ABNORMAL
NITRITE UR QL STRIP: NEGATIVE
PH UR STRIP: 7 [PH] (ref 5–8)
PROT UR QL STRIP: ABNORMAL
RBC #/AREA URNS HPF: 4 /HPF (ref 0–4)
SP GR UR STRIP: >1.03 (ref 1–1.03)
SQUAMOUS #/AREA URNS HPF: 7 /HPF
URN SPEC COLLECT METH UR: ABNORMAL
UROBILINOGEN UR STRIP-ACNC: ABNORMAL EU/DL
WBC #/AREA URNS HPF: 6 /HPF (ref 0–5)

## 2022-02-10 PROCEDURE — 81001 URINALYSIS AUTO W/SCOPE: CPT | Performed by: SPECIALIST

## 2022-02-10 PROCEDURE — 99211 OFF/OP EST MAY X REQ PHY/QHP: CPT

## 2022-02-10 PROCEDURE — 25000003 PHARM REV CODE 250: Performed by: SPECIALIST

## 2022-02-10 PROCEDURE — 63600175 PHARM REV CODE 636 W HCPCS: Performed by: SPECIALIST

## 2022-02-10 RX ORDER — BUTORPHANOL TARTRATE 2 MG/ML
1 INJECTION INTRAMUSCULAR; INTRAVENOUS ONCE
Status: COMPLETED | OUTPATIENT
Start: 2022-02-10 | End: 2022-02-10

## 2022-02-10 RX ADMIN — SODIUM CHLORIDE, POTASSIUM CHLORIDE, SODIUM LACTATE AND CALCIUM CHLORIDE 1000 ML: 600; 310; 30; 20 INJECTION, SOLUTION INTRAVENOUS at 04:02

## 2022-02-10 RX ADMIN — PROMETHAZINE HYDROCHLORIDE 12.5 MG: 25 INJECTION INTRAMUSCULAR; INTRAVENOUS at 04:02

## 2022-02-10 RX ADMIN — BUTORPHANOL TARTRATE 1 MG: 2 INJECTION, SOLUTION INTRAMUSCULAR; INTRAVENOUS at 04:02

## 2022-02-10 NOTE — NURSING
Patient arrived to unit from home c/o back pain. Patient states that she went walking yesterday and noticed at 1130 pm that she was keily. Patient states she took tylenol 500 mg. Patient was able to sleep until 330 and took another 500 mg of tylenol. Patient decided to come into L&D for back pain that would not resolve.    Patient given room orientation and placed in gown. UA collected.   Patient states that she is not hydrated and has not drank any fluids.   Patient was unaware of contractions until RN noted to patient. Vag exam performed. Patient was 1/50/-3. No leaking of fluid or bloody show.   Patient states that she threw up before she left and requests IV over PO hydration.     0429- Dr. Coates notified of patient status and concerns. Orders given to IV hydrate patient and to give Stadol 1 mg and Phenergan 12.5 mg.      0625- Dr. Coates updated on patient status. Joann-MELVIN checked patients cervix and was unchanged. At 1/50/-3.   0630- Dr. Coates gave orders for Discharge.   0645- IV fluids stopped and patient states that she is feeling better.  0650- Ashley BROOKS RN given report and discharged patient with instructions.

## 2022-02-16 ENCOUNTER — HOSPITAL ENCOUNTER (INPATIENT)
Facility: HOSPITAL | Age: 27
LOS: 3 days | Discharge: HOME | End: 2022-02-19
Attending: SPECIALIST | Admitting: SPECIALIST
Payer: MEDICAID

## 2022-02-16 DIAGNOSIS — Z34.90 ENCOUNTER FOR ELECTIVE INDUCTION OF LABOR: ICD-10-CM

## 2022-02-16 LAB
ABO + RH BLD: NORMAL
AMPHET+METHAMPHET UR QL: NEGATIVE
BARBITURATES UR QL SCN>200 NG/ML: NEGATIVE
BASOPHILS # BLD AUTO: 0.03 K/UL (ref 0–0.2)
BASOPHILS NFR BLD: 0.3 % (ref 0–1.9)
BENZODIAZ UR QL SCN>200 NG/ML: NEGATIVE
BILIRUB UR QL STRIP: NEGATIVE
BLD GP AB SCN CELLS X3 SERPL QL: NORMAL
BUPRENORPHINE UR QL: NEGATIVE
BZE UR QL SCN: NEGATIVE
CANNABINOIDS UR QL SCN: NEGATIVE
CLARITY UR: CLEAR
COLOR UR: YELLOW
CREAT UR-MCNC: 169 MG/DL (ref 15–325)
DIFFERENTIAL METHOD: ABNORMAL
EOSINOPHIL # BLD AUTO: 0 K/UL (ref 0–0.5)
EOSINOPHIL NFR BLD: 0.4 % (ref 0–8)
ERYTHROCYTE [DISTWIDTH] IN BLOOD BY AUTOMATED COUNT: 14.4 % (ref 11.5–14.5)
GLUCOSE UR QL STRIP: NEGATIVE
HCT VFR BLD AUTO: 30.8 % (ref 37–48.5)
HGB BLD-MCNC: 9.8 G/DL (ref 12–16)
HGB UR QL STRIP: ABNORMAL
IMM GRANULOCYTES # BLD AUTO: 0.03 K/UL (ref 0–0.04)
IMM GRANULOCYTES NFR BLD AUTO: 0.3 % (ref 0–0.5)
KETONES UR QL STRIP: NEGATIVE
LEUKOCYTE ESTERASE UR QL STRIP: NEGATIVE
LYMPHOCYTES # BLD AUTO: 2.2 K/UL (ref 1–4.8)
LYMPHOCYTES NFR BLD: 21.5 % (ref 18–48)
MCH RBC QN AUTO: 25.9 PG (ref 27–31)
MCHC RBC AUTO-ENTMCNC: 31.8 G/DL (ref 32–36)
MCV RBC AUTO: 81 FL (ref 82–98)
MONOCYTES # BLD AUTO: 0.8 K/UL (ref 0.3–1)
MONOCYTES NFR BLD: 8 % (ref 4–15)
NEUTROPHILS # BLD AUTO: 7.3 K/UL (ref 1.8–7.7)
NEUTROPHILS NFR BLD: 69.5 % (ref 38–73)
NITRITE UR QL STRIP: NEGATIVE
NRBC BLD-RTO: 0 /100 WBC
OPIATES UR QL SCN: NEGATIVE
PCP UR QL SCN>25 NG/ML: NEGATIVE
PH UR STRIP: 7 [PH] (ref 5–8)
PLATELET # BLD AUTO: 212 K/UL (ref 150–450)
PMV BLD AUTO: 12.8 FL (ref 9.2–12.9)
PROT UR QL STRIP: ABNORMAL
RBC # BLD AUTO: 3.79 M/UL (ref 4–5.4)
SP GR UR STRIP: 1.03 (ref 1–1.03)
TOXICOLOGY INFORMATION: NORMAL
URN SPEC COLLECT METH UR: ABNORMAL
UROBILINOGEN UR STRIP-ACNC: NEGATIVE EU/DL
WBC # BLD AUTO: 10.42 K/UL (ref 3.9–12.7)

## 2022-02-16 PROCEDURE — 85025 COMPLETE CBC W/AUTO DIFF WBC: CPT | Performed by: SPECIALIST

## 2022-02-16 PROCEDURE — 63600175 PHARM REV CODE 636 W HCPCS: Performed by: SPECIALIST

## 2022-02-16 PROCEDURE — 80307 DRUG TEST PRSMV CHEM ANLYZR: CPT | Performed by: SPECIALIST

## 2022-02-16 PROCEDURE — 81003 URINALYSIS AUTO W/O SCOPE: CPT | Mod: 59 | Performed by: SPECIALIST

## 2022-02-16 PROCEDURE — 86592 SYPHILIS TEST NON-TREP QUAL: CPT | Performed by: SPECIALIST

## 2022-02-16 PROCEDURE — 12000002 HC ACUTE/MED SURGE SEMI-PRIVATE ROOM

## 2022-02-16 PROCEDURE — 86901 BLOOD TYPING SEROLOGIC RH(D): CPT | Performed by: SPECIALIST

## 2022-02-16 RX ORDER — CARBOPROST TROMETHAMINE 250 UG/ML
250 INJECTION, SOLUTION INTRAMUSCULAR
Status: DISCONTINUED | OUTPATIENT
Start: 2022-02-16 | End: 2022-02-19 | Stop reason: HOSPADM

## 2022-02-16 RX ORDER — BUTORPHANOL TARTRATE 2 MG/ML
1 INJECTION INTRAMUSCULAR; INTRAVENOUS
Status: DISCONTINUED | OUTPATIENT
Start: 2022-02-16 | End: 2022-02-19 | Stop reason: HOSPADM

## 2022-02-16 RX ORDER — CALCIUM CARBONATE 200(500)MG
500 TABLET,CHEWABLE ORAL 3 TIMES DAILY PRN
Status: DISCONTINUED | OUTPATIENT
Start: 2022-02-16 | End: 2022-02-19 | Stop reason: HOSPADM

## 2022-02-16 RX ORDER — METHYLERGONOVINE MALEATE 0.2 MG/ML
200 INJECTION INTRAVENOUS
Status: DISCONTINUED | OUTPATIENT
Start: 2022-02-16 | End: 2022-02-19 | Stop reason: HOSPADM

## 2022-02-16 RX ORDER — MISOPROSTOL 200 UG/1
800 TABLET ORAL
Status: DISCONTINUED | OUTPATIENT
Start: 2022-02-16 | End: 2022-02-19 | Stop reason: HOSPADM

## 2022-02-16 RX ORDER — OXYTOCIN-SODIUM CHLORIDE 0.9% IV SOLN 30 UNIT/500ML 30-0.9/5 UT/ML-%
0-30 SOLUTION INTRAVENOUS CONTINUOUS
Status: DISCONTINUED | OUTPATIENT
Start: 2022-02-16 | End: 2022-02-19 | Stop reason: HOSPADM

## 2022-02-16 RX ORDER — ONDANSETRON 4 MG/1
8 TABLET, ORALLY DISINTEGRATING ORAL EVERY 8 HOURS PRN
Status: DISCONTINUED | OUTPATIENT
Start: 2022-02-16 | End: 2022-02-19 | Stop reason: HOSPADM

## 2022-02-16 RX ORDER — SODIUM CHLORIDE, SODIUM LACTATE, POTASSIUM CHLORIDE, CALCIUM CHLORIDE 600; 310; 30; 20 MG/100ML; MG/100ML; MG/100ML; MG/100ML
INJECTION, SOLUTION INTRAVENOUS CONTINUOUS
Status: DISCONTINUED | OUTPATIENT
Start: 2022-02-16 | End: 2022-02-19 | Stop reason: HOSPADM

## 2022-02-16 RX ORDER — SODIUM CHLORIDE 9 MG/ML
INJECTION, SOLUTION INTRAVENOUS
Status: DISCONTINUED | OUTPATIENT
Start: 2022-02-16 | End: 2022-02-19 | Stop reason: HOSPADM

## 2022-02-16 RX ADMIN — Medication 2 MILLI-UNITS/MIN: at 11:02

## 2022-02-17 ENCOUNTER — ANESTHESIA EVENT (OUTPATIENT)
Dept: OBSTETRICS AND GYNECOLOGY | Facility: HOSPITAL | Age: 27
End: 2022-02-17
Payer: MEDICAID

## 2022-02-17 ENCOUNTER — ANESTHESIA (OUTPATIENT)
Dept: OBSTETRICS AND GYNECOLOGY | Facility: HOSPITAL | Age: 27
End: 2022-02-17
Payer: MEDICAID

## 2022-02-17 PROBLEM — Z34.90 ENCOUNTER FOR ELECTIVE INDUCTION OF LABOR: Status: ACTIVE | Noted: 2022-02-17

## 2022-02-17 LAB — RPR SER QL: NORMAL

## 2022-02-17 PROCEDURE — 27200710 HC EPIDURAL INFUSION PUMP SET: Performed by: STUDENT IN AN ORGANIZED HEALTH CARE EDUCATION/TRAINING PROGRAM

## 2022-02-17 PROCEDURE — 12000002 HC ACUTE/MED SURGE SEMI-PRIVATE ROOM

## 2022-02-17 PROCEDURE — 62326 NJX INTERLAMINAR LMBR/SAC: CPT | Performed by: STUDENT IN AN ORGANIZED HEALTH CARE EDUCATION/TRAINING PROGRAM

## 2022-02-17 PROCEDURE — 72200004 HC VAGINAL DELIVERY LEVEL I

## 2022-02-17 PROCEDURE — 63600175 PHARM REV CODE 636 W HCPCS: Performed by: STUDENT IN AN ORGANIZED HEALTH CARE EDUCATION/TRAINING PROGRAM

## 2022-02-17 PROCEDURE — 25000003 PHARM REV CODE 250: Performed by: SPECIALIST

## 2022-02-17 PROCEDURE — C1751 CATH, INF, PER/CENT/MIDLINE: HCPCS | Performed by: STUDENT IN AN ORGANIZED HEALTH CARE EDUCATION/TRAINING PROGRAM

## 2022-02-17 PROCEDURE — 63600175 PHARM REV CODE 636 W HCPCS: Performed by: SPECIALIST

## 2022-02-17 RX ORDER — DOCUSATE SODIUM 100 MG/1
200 CAPSULE, LIQUID FILLED ORAL 2 TIMES DAILY PRN
Status: DISCONTINUED | OUTPATIENT
Start: 2022-02-17 | End: 2022-02-19 | Stop reason: HOSPADM

## 2022-02-17 RX ORDER — OXYCODONE AND ACETAMINOPHEN 10; 325 MG/1; MG/1
1 TABLET ORAL EVERY 4 HOURS PRN
Status: DISCONTINUED | OUTPATIENT
Start: 2022-02-17 | End: 2022-02-19 | Stop reason: HOSPADM

## 2022-02-17 RX ORDER — HYDROCORTISONE 25 MG/G
CREAM TOPICAL 3 TIMES DAILY PRN
Status: DISCONTINUED | OUTPATIENT
Start: 2022-02-17 | End: 2022-02-19 | Stop reason: HOSPADM

## 2022-02-17 RX ORDER — ONDANSETRON 2 MG/ML
4 INJECTION INTRAMUSCULAR; INTRAVENOUS EVERY 6 HOURS PRN
Status: DISCONTINUED | OUTPATIENT
Start: 2022-02-17 | End: 2022-02-19 | Stop reason: HOSPADM

## 2022-02-17 RX ORDER — NALOXONE HCL 0.4 MG/ML
0.4 VIAL (ML) INJECTION SEE ADMIN INSTRUCTIONS
Status: DISCONTINUED | OUTPATIENT
Start: 2022-02-17 | End: 2022-02-19 | Stop reason: HOSPADM

## 2022-02-17 RX ORDER — AMOXICILLIN 250 MG
1 CAPSULE ORAL NIGHTLY
Status: DISCONTINUED | OUTPATIENT
Start: 2022-02-17 | End: 2022-02-19 | Stop reason: HOSPADM

## 2022-02-17 RX ORDER — ROPIVACAINE HYDROCHLORIDE 2 MG/ML
INJECTION, SOLUTION EPIDURAL; INFILTRATION
Status: COMPLETED | OUTPATIENT
Start: 2022-02-17 | End: 2022-02-17

## 2022-02-17 RX ORDER — FENTANYL/BUPIVACAINE/NS/PF 2MCG/ML-.1
14 PLASTIC BAG, INJECTION (ML) INJECTION CONTINUOUS
Status: DISCONTINUED | OUTPATIENT
Start: 2022-02-17 | End: 2022-02-19 | Stop reason: HOSPADM

## 2022-02-17 RX ORDER — DIPHENHYDRAMINE HYDROCHLORIDE 50 MG/ML
12.5 INJECTION INTRAMUSCULAR; INTRAVENOUS EVERY 4 HOURS PRN
Status: DISCONTINUED | OUTPATIENT
Start: 2022-02-17 | End: 2022-02-19 | Stop reason: HOSPADM

## 2022-02-17 RX ORDER — EPHEDRINE SULFATE 50 MG/ML
10 INJECTION, SOLUTION INTRAVENOUS ONCE AS NEEDED
Status: DISCONTINUED | OUTPATIENT
Start: 2022-02-17 | End: 2022-02-19 | Stop reason: HOSPADM

## 2022-02-17 RX ORDER — DIPHENHYDRAMINE HCL 25 MG
25 CAPSULE ORAL EVERY 4 HOURS PRN
Status: DISCONTINUED | OUTPATIENT
Start: 2022-02-17 | End: 2022-02-19 | Stop reason: HOSPADM

## 2022-02-17 RX ORDER — PRENATAL WITH FERROUS FUM AND FOLIC ACID 3080; 920; 120; 400; 22; 1.84; 3; 20; 10; 1; 12; 200; 27; 25; 2 [IU]/1; [IU]/1; MG/1; [IU]/1; MG/1; MG/1; MG/1; MG/1; MG/1; MG/1; UG/1; MG/1; MG/1; MG/1; MG/1
1 TABLET ORAL DAILY
Status: DISCONTINUED | OUTPATIENT
Start: 2022-02-17 | End: 2022-02-19 | Stop reason: HOSPADM

## 2022-02-17 RX ORDER — SODIUM CHLORIDE 0.9 % (FLUSH) 0.9 %
10 SYRINGE (ML) INJECTION
Status: DISCONTINUED | OUTPATIENT
Start: 2022-02-17 | End: 2022-02-19 | Stop reason: HOSPADM

## 2022-02-17 RX ORDER — OXYCODONE AND ACETAMINOPHEN 5; 325 MG/1; MG/1
1 TABLET ORAL EVERY 4 HOURS PRN
Status: DISCONTINUED | OUTPATIENT
Start: 2022-02-17 | End: 2022-02-19 | Stop reason: HOSPADM

## 2022-02-17 RX ORDER — ACETAMINOPHEN 325 MG/1
650 TABLET ORAL EVERY 6 HOURS PRN
Status: DISCONTINUED | OUTPATIENT
Start: 2022-02-17 | End: 2022-02-19 | Stop reason: HOSPADM

## 2022-02-17 RX ADMIN — SENNOSIDES AND DOCUSATE SODIUM 1 TABLET: 8.6; 5 TABLET ORAL at 11:02

## 2022-02-17 RX ADMIN — SODIUM CHLORIDE, SODIUM LACTATE, POTASSIUM CHLORIDE, AND CALCIUM CHLORIDE: .6; .31; .03; .02 INJECTION, SOLUTION INTRAVENOUS at 07:02

## 2022-02-17 RX ADMIN — BUTORPHANOL TARTRATE 1 MG: 2 INJECTION, SOLUTION INTRAMUSCULAR; INTRAVENOUS at 03:02

## 2022-02-17 RX ADMIN — ACETAMINOPHEN 650 MG: 325 TABLET, FILM COATED ORAL at 08:02

## 2022-02-17 RX ADMIN — BUTORPHANOL TARTRATE 1 MG: 2 INJECTION, SOLUTION INTRAMUSCULAR; INTRAVENOUS at 12:02

## 2022-02-17 RX ADMIN — IBUPROFEN 600 MG: 400 TABLET ORAL at 11:02

## 2022-02-17 RX ADMIN — IBUPROFEN 600 MG: 400 TABLET ORAL at 04:02

## 2022-02-17 RX ADMIN — ROPIVACAINE HYDROCHLORIDE 10 ML: 2 INJECTION, SOLUTION EPIDURAL; INFILTRATION at 07:02

## 2022-02-17 NOTE — PROGRESS NOTES
[facility]  Obstetrics  Antepartum Progress Note    Patient Name: Mena Ovalle  MRN: [id number]  Admission Date: 2022  Hospital Length of Stay: 1 days  Attending Physician: Marlee Lindo MD  Primary Care Provider: Primary Doctor No    Subjective:     Principal Problem:Encounter for elective induction of labor    HPI:  No notes on file    Hospital Course:  No notes on file    Obstetric HPI:  26  at 39 wks for elective iol.   Objective:     Vital Signs (Most Recent):  Temp: 98.2 °F (36.8 °C) (22 0552)  Pulse: 92 (22 0900)  Resp: 18 (22 0831)  BP: (!) 86/53 (bolus given) (22 09)  SpO2: 97 % (22 0725) Vital Signs (24h Range):  Temp:  [97.9 °F (36.6 °C)-98.2 °F (36.8 °C)] 98.2 °F (36.8 °C)  Pulse:  [71-92] 92  Resp:  [18] 18  SpO2:  [97 %-98 %] 97 %  BP: ()/(53-87) 86/53     Weight: 97.1 kg (214 lb)  Body mass index is 33.52 kg/m².    FHT: 130s-140s accels No sig decels  TOCO:  Q 2-4 minutes    No intake or output data in the 24 hours ending 22 09    Cervical Exam:  Dilation:  3  Effacement:  50%  Station: -3  Presentation: Vertex   Amniotomy  Performed clear fluid.    Significant Labs:  Recent Lab Results       22        Benzodiazepines   Negative       Phencyclidine   Negative       BUPRENORPHINE   Negative  Comment: Buprenorphine urine screening threshold: 10 ng/mL.    This report is intended for use in clinical monitoring and management   of   patients only.          Amphetamine Screen, Ur   Negative       Appearance, UA   Clear       Barbiturate Screen, Ur   Negative       Baso # 0.03         Basophil % 0.3         Bilirubin (UA)   Negative       Cocaine (Metab.)   Negative       Color, UA   Yellow       Creatinine, Urine   169.0  Comment: The random urine reference ranges provided were established   for 24 hour urine collections.  No reference ranges exist for  random urine specimens.  Correlate clinically.          Differential Method Automated         Eos # 0.0         Eosinophil % 0.4         Glucose, UA   Negative       Gran # (ANC) 7.3         Gran % 69.5         Group & Rh A POS         Hematocrit 30.8         Hemoglobin 9.8         Immature Grans (Abs) 0.03  Comment: Mild elevation in immature granulocytes is non specific and   can be seen in a variety of conditions including stress response,   acute inflammation, trauma and pregnancy. Correlation with other   laboratory and clinical findings is essential.           Immature Granulocytes 0.3         INDIRECT MARILYN NEG         Ketones, UA   Negative       Leukocytes, UA   Negative       Lymph # 2.2         Lymph % 21.5         MCH 25.9         MCHC 31.8         MCV 81         Mono # 0.8         Mono % 8.0         MPV 12.8         NITRITE UA   Negative       nRBC 0         Occult Blood UA   Trace       Opiate Scrn, Ur   Negative       pH, UA   7.0       Platelets 212         Protein, UA   Trace  Comment: Recommend a 24 hour urine protein or a urine   protein/creatinine ratio if globulin induced proteinuria is  clinically suspected.         RBC 3.79         RDW 14.4         Specific [place], UA   1.030       Specimen UA   Urine, Clean Catch       Marijuana (THC) Metabolite   Negative       Toxicology Information   SEE COMMENT  Comment: This screen includes the following classes of drugs at the   listed cut-off:    Benzodiazepines                  200 ng/ml  Cocaine metabolite               300 ng/ml  Opiates                          300 ng/ml  Barbiturates                     200 ng/ml  Amphetamines                    1000 ng/ml  Marijuana metabs (THC)            50 ng/ml  Phencyclidine (PCP)               25 ng/ml    High concentrations of Methylenedioxymethamphetamine (MDMA aka  Ectasy) and other structurally similar compounds may cross-   react with the Amphetamine/Methamphetamine screening   immunoassay giving a false positive result.    Note: This exception list  includes only more common   interferants in toxicology screen testing.  Because of many   cross-reactantspositive results on toxicology drug screens   should be confirmed whenever results do not correlate with   clinical presentation.    This report is intended for use in clinical monitoring and  management of patients. It is not intended for use in   employment related drug testing.    Because of any cross-reactants, positive results on toxicology  drug screens should be confirmed whenever results do not  correlate with clinical presentation.    Presumptive positive results are unconfirmed and may be used   only for medical purposes.         UROBILINOGEN UA   Negative       WBC 10.42               Physical Exam   Comfortable with epidural  VSSAF  EFW7:4  Extr tr edema    Assessment/Plan:     26 y.o. female  at 39w0d for:    * Encounter for elective induction of labor  IUP at 39 wks. Pitocin IOL antic labor progress.           Marlee Lindo MD  Obstetrics  [facility]

## 2022-02-17 NOTE — ANESTHESIA PROCEDURE NOTES
Epidural    Patient location during procedure: OB   Reason for block: primary anesthetic   Reason for block: labor analgesia requested by patient and obstetrician  Diagnosis: IUP   Start time: 2/17/2022 7:45 AM  Timeout: 2/17/2022 7:45 AM  End time: 2/17/2022 8:00 AM    Staffing  Performing Provider: Jett Nunes MD  Authorizing Provider: Jett Nunes MD        Preanesthetic Checklist  Completed: patient identified, IV checked, site marked, risks and benefits discussed, surgical consent, monitors and equipment checked, pre-op evaluation, timeout performed, anesthesia consent given, hand hygiene performed and patient being monitored  Preparation  Patient position: sitting  Prep: ChloraPrep  Patient monitoring: Pulse Ox, continuous capnometry and Blood Pressure  Reason for block: primary anesthetic   Epidural  Skin Wheal: 3 mL  Administration type: continuous  Approach: midline  Interspace: L3-4    Injection technique: NEO air  Needle and Epidural Catheter  Needle type: Tuohy   Needle gauge: 18  Needle length: 3.5 inches  Needle insertion depth: 8 cm  Catheter type: springwQuolaw  Catheter size: 19 G  Catheter at skin depth: 13 cm  Insertion Attempts: 1  Test dose: 3 mL of lidocaine 1.5% with Epi 1-to-200,000  Additional Documentation: incremental injection, negative aspiration for heme and CSF and no signs/symptoms of IV or SA injection  Needle localization: anatomical landmarks  Assessment  Ease of block: easy  Patient's tolerance of the procedure: comfortable throughout block  Additional Notes  0.2% Ropivacaine given in 5cc increments afte NEO with air obtained. Patient tolerated procedure without difficulty, denies any numbness, tingling, paresthesia, or pain with injection. VSS throughout. OB nurse at bedside throughout procedure.    No inadvertent dural puncture with Tuohy.  Dural puncture not performed with spinal needle    Medications:  Medication Administration Time: 2/17/2022 7:55 AM  Medications:  ropivacaine (NAROPIN) solution 0.2%, 10 mL

## 2022-02-17 NOTE — L&D DELIVERY NOTE
[facility]  Vaginal Delivery   Operative Note    SUMMARY   Patient progressed to completely dilated completely effaced +3 station and .  Patient pushed through 1 contraction to deliver a viable male infant out occiput anterior.  Then the right arm delivered and body easily followed.  Infant was laid on maternal abdomen and bulb suction.  Infant was having a little difficulty getting stimulated to breathe so cord was doubly clamped and cut and special care nursery team called to delivery.  Infant required a little blow-by oxygen and perked up pretty quickly.  No lacerations were noted.  Placenta delivered spontaneously.  Uterus nice and firm.  Estimated blood loss 100 mL vaginal sweep all clear.  Apgars pending and weight pending  Patient tolerated delivery well. Sponge needle and lap counted correctly x2.    Indications: Encounter for elective induction of labor  Pregnancy complicated by:   Patient Active Problem List   Diagnosis    Iron deficiency    BMI 32.0-32.9,adult    Encounter for elective induction of labor     Admitting GA: 39w0d    This patient has no babies on file.

## 2022-02-17 NOTE — PLAN OF CARE
Patient has remained stable this shift. All vitals within limits. See flow sheet for assessments. Bleeding is controlled. Fundus is firm and midline, pain is managed . ID bands are on and verified. Patient has been educated this shift. All questions have been answered and denies needs at this time.

## 2022-02-17 NOTE — PLAN OF CARE
Problem: Adult Inpatient Plan of Care  Goal: Plan of Care Review  Outcome: Ongoing, Progressing  Goal: Patient-Specific Goal (Individualized)  Outcome: Ongoing, Progressing  Goal: Optimal Comfort and Wellbeing  Outcome: Ongoing, Progressing     Problem: Change in Fetal Wellbeing (Labor)  Goal: Stable Fetal Wellbeing  Outcome: Ongoing, Progressing     Problem: Infection (Labor)  Goal: Absence of Infection Signs and Symptoms  Outcome: Ongoing, Progressing

## 2022-02-17 NOTE — ANESTHESIA PREPROCEDURE EVALUATION
02/17/2022  Mena Ovalle is a 26 y.o., female.      Patient Active Problem List   Diagnosis    Iron deficiency    BMI 32.0-32.9,adult       Past Surgical History:   Procedure Laterality Date    WISDOM TOOTH EXTRACTION  11/15/2019        Tobacco Use:  The patient  reports that she has never smoked. She has never used smokeless tobacco.     No results found for this or any previous visit.          Lab Results   Component Value Date    WBC 10.42 02/16/2022    HGB 9.8 (L) 02/16/2022    HCT 30.8 (L) 02/16/2022    MCV 81 (L) 02/16/2022     02/16/2022     BMP  Lab Results   Component Value Date     06/25/2021    K 4.0 06/25/2021     06/25/2021    CO2 24 06/25/2021    BUN 7 06/25/2021    CREATININE 0.6 06/25/2021    CALCIUM 8.8 06/25/2021    ANIONGAP 12 06/25/2021    GLU 97 06/25/2021    GLU 90 04/03/2020     07/29/2019       No results found for this or any previous visit.            Anesthesia Evaluation    I have reviewed the Patient Summary Reports.    I have reviewed the Nursing Notes. I have reviewed the NPO Status.   I have reviewed the Medications.     Review of Systems  Anesthesia Hx:  No problems with previous Anesthesia  Denies Family Hx of Anesthesia complications.   Denies Personal Hx of Anesthesia complications.   Social:  Non-Smoker    Cardiovascular:  Cardiovascular Normal Exercise tolerance: good     Pulmonary:  Pulmonary Normal    Renal/:  Renal/ Normal     Musculoskeletal:  Musculoskeletal Normal    Neurological:  Neurology Normal    Endocrine:  Endocrine Normal    Psych:  Psychiatric Normal           Physical Exam  General:  Well nourished    Airway/Jaw/Neck:  Airway Findings: Mouth Opening: Normal Tongue: Normal  Mallampati: II  TM Distance: Normal, at least 6 cm  Jaw/Neck Findings:  Neck ROM: Normal ROM     Eyes/Ears/Nose:  EYES/EARS/NOSE FINDINGS: Normal    Dental:  Dental Findings: In tact   Chest/Lungs:  Chest/Lungs Findings: Clear to auscultation, Normal Respiratory Rate     Heart/Vascular:  Heart Findings: Rate: Normal  Rhythm: Regular Rhythm  Sounds: Normal        Mental Status:  Mental Status Findings:  Cooperative, Alert and Oriented         Anesthesia Plan  Type of Anesthesia, risks & benefits discussed:  Anesthesia Type:  epidural    Patient's Preference:   Plan Factors:          Intra-op Monitoring Plan: standard ASA monitors  Intra-op Monitoring Plan Comments:   Post Op Pain Control Plan:   Post Op Pain Control Plan Comments:     Induction:    Beta Blocker:  Patient is not currently on a Beta-Blocker (No further documentation required).       Informed Consent: Patient understands risks and agrees with Anesthesia plan.  Questions answered. Anesthesia consent signed with patient.  ASA Score: 2     Day of Surgery Review of History & Physical:        Anesthesia Plan Notes: AUBREY; preload given prior to dosing epidural        Ready For Surgery From Anesthesia Perspective.

## 2022-02-17 NOTE — SUBJECTIVE & OBJECTIVE
Obstetric HPI:  26  at 39 wks for elective iol.   Objective:     Vital Signs (Most Recent):  Temp: 98.2 °F (36.8 °C) (22 0552)  Pulse: 92 (22 0900)  Resp: 18 (22 0831)  BP: (!) 86/53 (bolus given) (22 0900)  SpO2: 97 % (22 0725) Vital Signs (24h Range):  Temp:  [97.9 °F (36.6 °C)-98.2 °F (36.8 °C)] 98.2 °F (36.8 °C)  Pulse:  [71-92] 92  Resp:  [18] 18  SpO2:  [97 %-98 %] 97 %  BP: ()/(53-87) 86/53     Weight: 97.1 kg (214 lb)  Body mass index is 33.52 kg/m².    FHT: 130s-140s accels No sig decels  TOCO:  Q 2-4 minutes    No intake or output data in the 24 hours ending 22 09    Cervical Exam:  Dilation:  3  Effacement:  50%  Station: -3  Presentation: Vertex   Amniotomy  Performed clear fluid.    Significant Labs:  Recent Lab Results       22        Benzodiazepines   Negative       Phencyclidine   Negative       BUPRENORPHINE   Negative  Comment: Buprenorphine urine screening threshold: 10 ng/mL.    This report is intended for use in clinical monitoring and management   of   patients only.          Amphetamine Screen, Ur   Negative       Appearance, UA   Clear       Barbiturate Screen, Ur   Negative       Baso # 0.03         Basophil % 0.3         Bilirubin (UA)   Negative       Cocaine (Metab.)   Negative       Color, UA   Yellow       Creatinine, Urine   169.0  Comment: The random urine reference ranges provided were established   for 24 hour urine collections.  No reference ranges exist for  random urine specimens.  Correlate clinically.         Differential Method Automated         Eos # 0.0         Eosinophil % 0.4         Glucose, UA   Negative       Gran # (ANC) 7.3         Gran % 69.5         Group & Rh A POS         Hematocrit 30.8         Hemoglobin 9.8         Immature Grans (Abs) 0.03  Comment: Mild elevation in immature granulocytes is non specific and   can be seen in a variety of conditions including stress response,    acute inflammation, trauma and pregnancy. Correlation with other   laboratory and clinical findings is essential.           Immature Granulocytes 0.3         INDIRECT MARILYN NEG         Ketones, UA   Negative       Leukocytes, UA   Negative       Lymph # 2.2         Lymph % 21.5         MCH 25.9         MCHC 31.8         MCV 81         Mono # 0.8         Mono % 8.0         MPV 12.8         NITRITE UA   Negative       nRBC 0         Occult Blood UA   Trace       Opiate Scrn, Ur   Negative       pH, UA   7.0       Platelets 212         Protein, UA   Trace  Comment: Recommend a 24 hour urine protein or a urine   protein/creatinine ratio if globulin induced proteinuria is  clinically suspected.         RBC 3.79         RDW 14.4         Specific [place], UA   1.030       Specimen UA   Urine, Clean Catch       Marijuana (THC) Metabolite   Negative       Toxicology Information   SEE COMMENT  Comment: This screen includes the following classes of drugs at the   listed cut-off:    Benzodiazepines                  200 ng/ml  Cocaine metabolite               300 ng/ml  Opiates                          300 ng/ml  Barbiturates                     200 ng/ml  Amphetamines                    1000 ng/ml  Marijuana metabs (THC)            50 ng/ml  Phencyclidine (PCP)               25 ng/ml    High concentrations of Methylenedioxymethamphetamine (MDMA aka  Ectasy) and other structurally similar compounds may cross-   react with the Amphetamine/Methamphetamine screening   immunoassay giving a false positive result.    Note: This exception list includes only more common   interferants in toxicology screen testing.  Because of many   cross-reactantspositive results on toxicology drug screens   should be confirmed whenever results do not correlate with   clinical presentation.    This report is intended for use in clinical monitoring and  management of patients. It is not intended for use in   employment related drug  testing.    Because of any cross-reactants, positive results on toxicology  drug screens should be confirmed whenever results do not  correlate with clinical presentation.    Presumptive positive results are unconfirmed and may be used   only for medical purposes.         UROBILINOGEN UA   Negative       WBC 10.42               Physical Exam   Comfortable with epidural  VSSAF  EFW7:4  Extr tr edema

## 2022-02-18 LAB
BASOPHILS # BLD AUTO: 0.03 K/UL (ref 0–0.2)
BASOPHILS NFR BLD: 0.3 % (ref 0–1.9)
DIFFERENTIAL METHOD: ABNORMAL
EOSINOPHIL # BLD AUTO: 0.1 K/UL (ref 0–0.5)
EOSINOPHIL NFR BLD: 0.6 % (ref 0–8)
ERYTHROCYTE [DISTWIDTH] IN BLOOD BY AUTOMATED COUNT: 14.6 % (ref 11.5–14.5)
HCT VFR BLD AUTO: 27.7 % (ref 37–48.5)
HGB BLD-MCNC: 8.9 G/DL (ref 12–16)
IMM GRANULOCYTES # BLD AUTO: 0.02 K/UL (ref 0–0.04)
IMM GRANULOCYTES NFR BLD AUTO: 0.2 % (ref 0–0.5)
LYMPHOCYTES # BLD AUTO: 2.3 K/UL (ref 1–4.8)
LYMPHOCYTES NFR BLD: 23 % (ref 18–48)
MCH RBC QN AUTO: 26.2 PG (ref 27–31)
MCHC RBC AUTO-ENTMCNC: 32.1 G/DL (ref 32–36)
MCV RBC AUTO: 82 FL (ref 82–98)
MONOCYTES # BLD AUTO: 0.8 K/UL (ref 0.3–1)
MONOCYTES NFR BLD: 8.2 % (ref 4–15)
NEUTROPHILS # BLD AUTO: 6.7 K/UL (ref 1.8–7.7)
NEUTROPHILS NFR BLD: 67.7 % (ref 38–73)
NRBC BLD-RTO: 0 /100 WBC
PLATELET # BLD AUTO: 214 K/UL (ref 150–450)
PMV BLD AUTO: 11.5 FL (ref 9.2–12.9)
RBC # BLD AUTO: 3.4 M/UL (ref 4–5.4)
WBC # BLD AUTO: 9.86 K/UL (ref 3.9–12.7)

## 2022-02-18 PROCEDURE — 36415 COLL VENOUS BLD VENIPUNCTURE: CPT | Performed by: SPECIALIST

## 2022-02-18 PROCEDURE — 25000003 PHARM REV CODE 250: Performed by: SPECIALIST

## 2022-02-18 PROCEDURE — 12000002 HC ACUTE/MED SURGE SEMI-PRIVATE ROOM

## 2022-02-18 PROCEDURE — 85025 COMPLETE CBC W/AUTO DIFF WBC: CPT | Performed by: SPECIALIST

## 2022-02-18 RX ADMIN — OXYCODONE HYDROCHLORIDE AND ACETAMINOPHEN 1 TABLET: 10; 325 TABLET ORAL at 12:02

## 2022-02-18 RX ADMIN — OXYCODONE HYDROCHLORIDE AND ACETAMINOPHEN 1 TABLET: 5; 325 TABLET ORAL at 07:02

## 2022-02-18 RX ADMIN — IBUPROFEN 600 MG: 400 TABLET ORAL at 05:02

## 2022-02-18 RX ADMIN — IBUPROFEN 600 MG: 400 TABLET ORAL at 10:02

## 2022-02-18 RX ADMIN — DIPHENHYDRAMINE HYDROCHLORIDE 25 MG: 25 CAPSULE ORAL at 09:02

## 2022-02-18 RX ADMIN — SENNOSIDES AND DOCUSATE SODIUM 1 TABLET: 8.6; 5 TABLET ORAL at 09:02

## 2022-02-18 RX ADMIN — PRENATAL VIT W/ FE FUMARATE-FA TAB 27-0.8 MG 1 TABLET: 27-0.8 TAB at 08:02

## 2022-02-18 RX ADMIN — DOCUSATE SODIUM 200 MG: 100 CAPSULE, LIQUID FILLED ORAL at 08:02

## 2022-02-18 NOTE — ANESTHESIA POSTPROCEDURE EVALUATION
Anesthesia Post Evaluation    Patient: Mena Jimenezthieumelinda    Procedure(s) Performed: * No procedures listed *    Final Anesthesia Type: epidural      Patient location during evaluation: labor & delivery  Patient participation: Yes- Able to Participate  Level of consciousness: awake and alert  Post-procedure vital signs: reviewed and stable  Pain management: adequate  Airway patency: patent  RIANA mitigation strategies: Use of major conduction anesthesia (spinal/epidural) or peripheral nerve block  PONV status at discharge: No PONV  Anesthetic complications: no      Cardiovascular status: hemodynamically stable  Respiratory status: unassisted  Hydration status: euvolemic  Follow-up not needed.  Comments: The patient is one day status post receiving a labor epidural for vaginal delivery. The patient shows no signs of oversedation.  She reports no headache and no sensory/motor deficits in lower extremities. The patient reports no back pain, and there are no signs of infection at the epidural site. The epidural catheter has been removed. There are no other anesthesia issues. Pt is overall satisfied with care.              Vitals Value Taken Time   /78 02/18/22 0711   Temp 36.9 °C (98.5 °F) 02/18/22 0711   Pulse 75 02/18/22 0711   Resp 18 02/18/22 0711   SpO2 98 % 02/18/22 0711         No case tracking events are documented in the log.      Pain/Wei Score: Pain Rating Prior to Med Admin: 4 (2/18/2022  5:06 AM)  Pain Rating Post Med Admin: 0 (2/18/2022 12:26 AM)

## 2022-02-18 NOTE — NURSING
"2115: RN at bedside. Patient able to lift both legs off bed and suspend them for a few seconds each. Patient able to bend both knees and ankles. Velazco removed per protocol. 400ml of urine in drainage bag.    2200: Report called to mother baby unit. Primary RN will attempt to get patient up to bathroom before transfer    2215: RN at bedside, fundus is firm and midline with light bleeding.    2220: Wheelchair brought to bedside. RN explained to patient that an attempt will be made to get patient up to the bathroom. Patient verbalized that she wasn't sure if she could walk to the bathroom and wanted to pivot to the wheelchair instead. Patient states that she doesn't feel the need to void at this time. Patient states that her right leg is "fine". Patient states that her left leg is "tingly" on the outer part of her leg but the inner side was not numb or "tingly". Patient has good bilateral lower ROM and strength. RN cleaned patient's perineum and placed underwear with pad on patient. RN and patient's  assisted patient to wheelchair. Patient able to stand unassisted and take small steps with help to wheelchair.    2227: Patient transferred to 2100 unit. Patient requests to stay in wheelchair until she can see her baby in the NICU. MELVIN Perez at bedside for report and aware of patient ambulatory status.  "

## 2022-02-18 NOTE — ASSESSMENT & PLAN NOTE
Postpartum day 1. Spontaneous vaginal delivery  Plan discharge home tomorrow  Baby in nursery still with tachypnea  Rh positive

## 2022-02-18 NOTE — SUBJECTIVE & OBJECTIVE
Interval History:  Postpartum day 1. Spontaneous vaginal delivery    She is doing well this morning. She is tolerating a regular diet without nausea or vomiting. She is voiding spontaneously. She is ambulating. She has passed flatus, and has not a BM. Vaginal bleeding is mild. She denies fever or chills. Abdominal pain is mild and controlled with oral medications. She Is not breastfeeding. She desires circumcision for her male baby: yes.    Objective:     Vital Signs (Most Recent):  Temp: 97.9 °F (36.6 °C) (02/18/22 0402)  Pulse: 74 (02/18/22 0402)  Resp: 19 (02/18/22 0402)  BP: 105/70 (02/18/22 0402)  SpO2: 98 % (02/18/22 0402) Vital Signs (24h Range):  Temp:  [97.9 °F (36.6 °C)-98.9 °F (37.2 °C)] 97.9 °F (36.6 °C)  Pulse:  [71-95] 74  Resp:  [18-20] 19  SpO2:  [97 %-98 %] 98 %  BP: ()/(53-86) 105/70     Weight: 97.1 kg (214 lb)  Body mass index is 33.52 kg/m².      Intake/Output Summary (Last 24 hours) at 2/18/2022 0805  Last data filed at 2/18/2022 0500  Gross per 24 hour   Intake --   Output 2002 ml   Net -2002 ml         Significant Labs:  Lab Results   Component Value Date    GROUPTRH A POS 02/16/2022    HEPBSAG Negative 06/25/2021    STREPBCULT Negative 01/26/2022     Recent Labs   Lab 02/18/22 0437   HGB 8.9*   HCT 27.7*       CBC:   Recent Labs   Lab 02/18/22 0437   WBC 9.86   RBC 3.40*   HGB 8.9*   HCT 27.7*      MCV 82   MCH 26.2*   MCHC 32.1     I have personallly reviewed all pertinent lab results from the last 24 hours.    Physical Exam   Postpartum day 1. Spontaneous vaginal delivery  Vital signs stable afebrile  Fundus firm nontender  Extremities no Homans or edema

## 2022-02-18 NOTE — PROGRESS NOTES
[facility]  Obstetrics  Postpartum Progress Note    Patient Name: Mena Ovalle  MRN: [id number]  Admission Date: 2/16/2022  Hospital Length of Stay: 2 days  Attending Physician: Marlee Lindo MD  Primary Care Provider: Primary Doctor No    Subjective:     Principal Problem:Encounter for elective induction of labor    Hospital Course:  No notes on file    Interval History:  Postpartum day 1. Spontaneous vaginal delivery    She is doing well this morning. She is tolerating a regular diet without nausea or vomiting. She is voiding spontaneously. She is ambulating. She has passed flatus, and has not a BM. Vaginal bleeding is mild. She denies fever or chills. Abdominal pain is mild and controlled with oral medications. She Is not breastfeeding. She desires circumcision for her male baby: yes.    Objective:     Vital Signs (Most Recent):  Temp: 97.9 °F (36.6 °C) (02/18/22 0402)  Pulse: 74 (02/18/22 0402)  Resp: 19 (02/18/22 0402)  BP: 105/70 (02/18/22 0402)  SpO2: 98 % (02/18/22 0402) Vital Signs (24h Range):  Temp:  [97.9 °F (36.6 °C)-98.9 °F (37.2 °C)] 97.9 °F (36.6 °C)  Pulse:  [71-95] 74  Resp:  [18-20] 19  SpO2:  [97 %-98 %] 98 %  BP: ()/(53-86) 105/70     Weight: 97.1 kg (214 lb)  Body mass index is 33.52 kg/m².      Intake/Output Summary (Last 24 hours) at 2/18/2022 0805  Last data filed at 2/18/2022 0500  Gross per 24 hour   Intake --   Output 2002 ml   Net -2002 ml         Significant Labs:  Lab Results   Component Value Date    GROUPTRH A POS 02/16/2022    HEPBSAG Negative 06/25/2021    STREPBCULT Negative 01/26/2022     Recent Labs   Lab 02/18/22 0437   HGB 8.9*   HCT 27.7*       CBC:   Recent Labs   Lab 02/18/22 0437   WBC 9.86   RBC 3.40*   HGB 8.9*   HCT 27.7*      MCV 82   MCH 26.2*   MCHC 32.1     I have personallly reviewed all pertinent lab results from the last 24 hours.    Physical Exam   Postpartum day 1. Spontaneous vaginal delivery  Vital signs stable  afebrile  Fundus firm nontender  Extremities no Homans or edema    Assessment/Plan:     26 y.o. female  for:    * Encounter for elective induction of labor  IUP at 39 wks. Pitocin IOL antic labor progress.     Spontaneous vaginal delivery  Postpartum day 1. Spontaneous vaginal delivery  Plan discharge home tomorrow  Baby in nursery still with tachypnea  Rh positive        Disposition: As patient meets milestones, will plan to discharge tomorrow.    Marlee Lindo MD  Obstetrics  Counts include 234 beds at the [facility]

## 2022-02-18 NOTE — PROGRESS NOTES
Assessment completed: at bedside with mother     Address mother and baby will discharge home to: [address] [place] [address]    History of Substance Abuse issues:  Mother denies                Assistive Treatment Programs or Medications?  Mother denies    History of Mental Health issues:  Mother denies    History of Domestic Violence:  Mother denies    Social Work Intern conducted a full assessment at bedside with mother for an infant in the NICU. Mother is visiting baby frequently, and states baby is doing well. Mother has no needs at this time. Social Work Intern updated white board in room. Mother was encouraged to contact office should any needs arise. Case Management to follow.       02/18/22 2337   OB Discharge Planning Assessment   Assessment Type Discharge Planning Assessment   Source of Information patient;health record   Verified Demographic and Insurance Information Yes   Insurance Medicaid   Medicaid [facility]   Medicaid Insurance Primary   Lives With child(jeramie), dependent;spouse   Name(s) of Who Lives With Patient Thien Ovalle- / Gabriela Randhawa-daughter (12) / Uma Ovalle-daughter (4)   Number people in home 4   Relationship Status    Name of Support/Comfort Primary Source Thien Ovalle- ([phone])   Other children (include names and ages) none reported   Employed Full Time   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   Currently Enrolled in School No   Highest Level of Education Technical School   Father's Involvement Fully Involved   Is Father signing the birth certificate Yes   Father's Address lives with patient   Father Currently Enrolled in School No   Father's Employer [employer] / Technician   Family Involvement High   Primary Contact Name and Number Thien Jiang ([phone])   Received Prenatal Care Yes   Transportation Anticipated car, drives self   Receive WIC Benefits Already certified, will apply for new born     Arrangements Self   Adoption Planned no   Infant Feeding Plan formula feeding   Previous Breastfeeding Experience no   Breast Pump Needed no   Does baby have crib or safe sleep space? Yes   Do you have a car seat? Yes   Has other essential care items? Clothing;Bottles;Diapers   Pediatrician Dr. Sarah Calderon   Resource/Environmental Concerns none   Equipment Currently Used at Home none   Potential Discharge Needs None   DME Needed Upon Discharge  none   DCFS No indications (Indicators for Report)   Discharge Plan A Home   Discharge Plan B Home   Do you have any problems affording any of your prescribed medications? No

## 2022-02-19 PROCEDURE — 25000003 PHARM REV CODE 250: Performed by: SPECIALIST

## 2022-02-19 RX ORDER — OXYCODONE AND ACETAMINOPHEN 5; 325 MG/1; MG/1
1 TABLET ORAL EVERY 4 HOURS PRN
Qty: 12 TABLET | Refills: 0 | Status: SHIPPED | OUTPATIENT
Start: 2022-02-19 | End: 2022-04-19

## 2022-02-19 RX ADMIN — PRENATAL VIT W/ FE FUMARATE-FA TAB 27-0.8 MG 1 TABLET: 27-0.8 TAB at 07:02

## 2022-02-19 RX ADMIN — IBUPROFEN 600 MG: 400 TABLET ORAL at 03:02

## 2022-02-19 RX ADMIN — OXYCODONE HYDROCHLORIDE AND ACETAMINOPHEN 1 TABLET: 10; 325 TABLET ORAL at 07:02

## 2022-02-19 RX ADMIN — DOCUSATE SODIUM 200 MG: 100 CAPSULE, LIQUID FILLED ORAL at 07:02

## 2022-02-19 RX ADMIN — OXYCODONE HYDROCHLORIDE AND ACETAMINOPHEN 1 TABLET: 5; 325 TABLET ORAL at 04:02

## 2022-02-19 NOTE — DISCHARGE INSTRUCTIONS

## 2022-02-19 NOTE — DISCHARGE SUMMARY
[facility]  Obstetrics  Discharge Summary      Patient Name: Mena Ovalle  MRN: [id number]  Admission Date: 2/16/2022  Hospital Length of Stay: 3 days  Discharge Date and Time:  02/19/2022 9:37 AM  Attending Physician: Dread Lindo MD   Discharging Provider: Dread Lindo MD   Primary Care Provider: Primary Doctor No    HPI: No notes on file        * No surgery found *     Hospital Course:   No notes on file     Consults (From admission, onward)        Status Ordering Provider     Inpatient consult to Anesthesiology  Once        Provider:  (Not yet assigned)    DREAD Stroud     Consult to Lactation  Use PRN      Provider:  (Not yet assigned)    DREAD Stroud          Final Active Diagnoses:    Diagnosis Date Noted POA    PRINCIPAL PROBLEM:  Encounter for elective induction of labor [Z34.90] 02/17/2022 Not Applicable    Spontaneous vaginal delivery [O80] 02/18/2022 Not Applicable      Problems Resolved During this Admission:        Significant Diagnostic Studies: Labs:   CBC   Recent Labs   Lab 02/18/22  0437   WBC 9.86   HGB 8.9*   HCT 27.7*        Lab Results   Component Value Date    GROUPTRH A POS 02/16/2022         Feeding Method: breast  Physical exam:  Patient doing well  Fundus firm nontender  No Homans or edema    Assessment plan:  Postpartum day 2. Spontaneous vaginal delivery  Baby still in the NICU transitioning  Plan discharge home reaming of possible  Rh positive    Immunizations     Date Immunization Status Dose Route/Site Given by    02/17/22 1509 MMR Incomplete 0.5 mL Subcutaneous/     02/17/22 1509 Tdap Incomplete 0.5 mL Intramuscular/           Delivery:    Episiotomy: None   Lacerations: None   Repair suture: None   Repair # of packets: 0   Blood loss (ml):       Birth information:  YOB: 2024   Time of birth: 1:55 PM   Sex: male   Delivery type: Vaginal, Spontaneous   Gestational Age: 39w0d    Delivery Clinician:      Other  providers:       Additional  information:  Forceps:    Vacuum:    Breech:    Observed anomalies      Living?:           APGARS  One minute Five minutes Ten minutes   Skin color:         Heart rate:         Grimace:         Muscle tone:         Breathing:         Totals: 4  9        Placenta: Delivered:       appearance    Pending Diagnostic Studies:     None          Discharged Condition: good    Disposition: Home or Self Care    Follow Up:    Patient Instructions:      Diet Adult Regular     No driving until:   Order Comments: 14 days for vaginal delivery  28 days for  Section     No dressing needed     Leave dressing on - Keep it clean, dry, and intact until clinic visit     Activity as tolerated   Order Comments: Pelvic rest x 6 weeks     Medications:  Current Discharge Medication List      START taking these medications    Details   oxyCODONE-acetaminophen (PERCOCET) 5-325 mg per tablet Take 1 tablet by mouth every 4 (four) hours as needed.  Qty: 12 tablet, Refills: 0    Comments: Quantity prescribed more than 7 day supply? Yes, quantity medically necessary         STOP taking these medications       prenatal vit/iron fum/folic ac (PRENATAL 1+1 ORAL) Comments:   Reason for Stopping:         promethazine (PHENERGAN) 25 MG tablet Comments:   Reason for Stopping:               Marlee Lindo MD  Obstetrics  [facility]

## 2022-02-24 VITALS
HEIGHT: 67 IN | BODY MASS INDEX: 33.59 KG/M2 | SYSTOLIC BLOOD PRESSURE: 116 MMHG | WEIGHT: 214 LBS | HEART RATE: 81 BPM | DIASTOLIC BLOOD PRESSURE: 74 MMHG | RESPIRATION RATE: 18 BRPM | OXYGEN SATURATION: 97 % | TEMPERATURE: 98 F

## 2022-03-10 ENCOUNTER — PATIENT MESSAGE (OUTPATIENT)
Dept: PEDIATRICS | Facility: CLINIC | Age: 27
End: 2022-03-10
Payer: MEDICAID

## 2022-04-01 ENCOUNTER — TELEPHONE (OUTPATIENT)
Dept: OPHTHALMOLOGY | Facility: CLINIC | Age: 27
End: 2022-04-01
Payer: MEDICAID

## 2022-04-01 NOTE — TELEPHONE ENCOUNTER
----- Message from Magy Colunga sent at 4/1/2022  1:52 PM CDT -----  Contact: pt  Type:  Sooner Apoointment Request    Caller is requesting a sooner appointment.  Caller declined first available appointment listed below.  Caller will not accept being placed on the waitlist and is requesting a message be sent to doctor.    Name of Caller:  pt  When is the first available appointment?  none  Symptoms:  eye  exam and need glasses  Best Call Back Number: [phone]   Additional Information: needs a appt

## 2022-04-18 ENCOUNTER — TELEPHONE (OUTPATIENT)
Dept: FAMILY MEDICINE | Facility: CLINIC | Age: 27
End: 2022-04-18
Payer: MEDICAID

## 2022-04-19 ENCOUNTER — OFFICE VISIT (OUTPATIENT)
Dept: FAMILY MEDICINE | Facility: CLINIC | Age: 27
End: 2022-04-19
Payer: MEDICAID

## 2022-04-19 VITALS
HEART RATE: 86 BPM | DIASTOLIC BLOOD PRESSURE: 78 MMHG | BODY MASS INDEX: 31.39 KG/M2 | RESPIRATION RATE: 16 BRPM | WEIGHT: 200 LBS | SYSTOLIC BLOOD PRESSURE: 130 MMHG | HEIGHT: 67 IN | OXYGEN SATURATION: 98 %

## 2022-04-19 DIAGNOSIS — R20.0 BILATERAL LEG NUMBNESS: Primary | ICD-10-CM

## 2022-04-19 PROCEDURE — 99213 OFFICE O/P EST LOW 20 MIN: CPT | Mod: S$GLB,,, | Performed by: NURSE PRACTITIONER

## 2022-04-19 PROCEDURE — 99213 PR OFFICE/OUTPT VISIT, EST, LEVL III, 20-29 MIN: ICD-10-PCS | Mod: S$GLB,,, | Performed by: NURSE PRACTITIONER

## 2022-04-19 PROCEDURE — 3008F BODY MASS INDEX DOCD: CPT | Mod: CPTII,S$GLB,, | Performed by: NURSE PRACTITIONER

## 2022-04-19 PROCEDURE — 3075F SYST BP GE 130 - 139MM HG: CPT | Mod: CPTII,S$GLB,, | Performed by: NURSE PRACTITIONER

## 2022-04-19 PROCEDURE — 1159F PR MEDICATION LIST DOCUMENTED IN MEDICAL RECORD: ICD-10-PCS | Mod: CPTII,S$GLB,, | Performed by: NURSE PRACTITIONER

## 2022-04-19 PROCEDURE — 1159F MED LIST DOCD IN RCRD: CPT | Mod: CPTII,S$GLB,, | Performed by: NURSE PRACTITIONER

## 2022-04-19 PROCEDURE — 3078F PR MOST RECENT DIASTOLIC BLOOD PRESSURE < 80 MM HG: ICD-10-PCS | Mod: CPTII,S$GLB,, | Performed by: NURSE PRACTITIONER

## 2022-04-19 PROCEDURE — 3078F DIAST BP <80 MM HG: CPT | Mod: CPTII,S$GLB,, | Performed by: NURSE PRACTITIONER

## 2022-04-19 PROCEDURE — 3008F PR BODY MASS INDEX (BMI) DOCUMENTED: ICD-10-PCS | Mod: CPTII,S$GLB,, | Performed by: NURSE PRACTITIONER

## 2022-04-19 PROCEDURE — 3075F PR MOST RECENT SYSTOLIC BLOOD PRESS GE 130-139MM HG: ICD-10-PCS | Mod: CPTII,S$GLB,, | Performed by: NURSE PRACTITIONER

## 2022-04-19 NOTE — PROGRESS NOTES
SUBJECTIVE:      Patient ID: Mena Ovalle is a 26 y.o. female.    Chief Complaint: Numbness    HPI  Has leg numbness- only at night and in morning upon awakening; no cramps in calf muscles  Denies chest pain  Denies sob  Denies chills  Denies fever  Vss; bp is well controlled  Had baby in feb  Was given epidural per patient on thurs and cont to sat  And feeling came back  Bilateral legs mostly the left leg- is just numb  This was the patient's third epidural  Was on a continuous drip of ropivicaine  5/5 sensation on feet with bilateral foot exam  2+ pedal pulses  crt less than 2 sec  Patient is able to move and walk and control movements of bilateral legs and feet    Past Surgical History:   Procedure Laterality Date    WISDOM TOOTH EXTRACTION  11/15/2019     Family History   Problem Relation Age of Onset    Breast cancer Maternal Grandmother     Lymphoma Maternal Grandmother     Cervical cancer Maternal Grandmother     Pulmonary embolism Mother         MTHFR    Glaucoma Neg Hx     Retinal detachment Neg Hx     Macular degeneration Neg Hx     Ovarian cancer Neg Hx       Social History     Socioeconomic History    Marital status:    Tobacco Use    Smoking status: Never Smoker    Smokeless tobacco: Never Used   Substance and Sexual Activity    Alcohol use: Yes    Drug use: No    Sexual activity: Yes     Partners: Male     No current outpatient medications on file.     No current facility-administered medications for this visit.     Review of patient's allergies indicates:  No Known Allergies   Past Medical History:   Diagnosis Date    Abnormal Pap smear of cervix     repeat pap    Bronchitis     COVID 01/18/2022    Family history of pulmonary embolism     MOTHER with MTHFR, PE found after knee replacement.     Iron deficiency anemia 5/17/2018    Pneumonia      Past Surgical History:   Procedure Laterality Date    WISDOM TOOTH EXTRACTION  11/15/2019       Review of Systems   All  "other systems reviewed and are negative.     OBJECTIVE:      Vitals:    04/19/22 1042   BP: 130/78   BP Location: Left arm   Patient Position: Sitting   BP Method: Small (Automatic)   Pulse: 86   Resp: 16   SpO2: 98%   Weight: 90.7 kg (200 lb)   Height: 5' 7" (1.702 m)     Physical Exam  Vitals and nursing note reviewed.   Constitutional:       Appearance: Normal appearance. She is obese.   HENT:      Head: Normocephalic and atraumatic.   Eyes:      Pupils: Pupils are equal, round, and reactive to light.   Cardiovascular:      Rate and Rhythm: Normal rate and regular rhythm.      Pulses: Normal pulses.      Heart sounds: Normal heart sounds.   Pulmonary:      Effort: Pulmonary effort is normal.      Breath sounds: Normal breath sounds.   Musculoskeletal:      Cervical back: Normal range of motion.      Comments: Full ROM   2+ pedal pulses bilateral  Less than 2 sec CRT on bilateral feet  5/5 sensation on bilateral feet    Skin:     General: Skin is warm and dry.      Capillary Refill: Capillary refill takes less than 2 seconds.   Neurological:      General: No focal deficit present.      Mental Status: She is alert and oriented to person, place, and time. Mental status is at baseline.   Psychiatric:         Mood and Affect: Mood normal.         Behavior: Behavior normal.         Thought Content: Thought content normal.         Judgment: Judgment normal.      Comments: nonsuicidal        Assessment:       1. Bilateral leg numbness        Plan:       Bilateral leg numbness  -     Ambulatory referral/consult to Neurology; Future; Expected date: 04/19/2022      See dr nina ridley neuro  No follow-ups on file.      4/19/2022 BROOKLYN Nunes, FNP      "

## 2022-05-09 ENCOUNTER — LAB VISIT (OUTPATIENT)
Dept: LAB | Facility: HOSPITAL | Age: 27
End: 2022-05-09
Attending: NURSE PRACTITIONER
Payer: MEDICAID

## 2022-05-09 DIAGNOSIS — R20.2 PARESTHESIA: Primary | ICD-10-CM

## 2022-05-09 LAB
ALBUMIN SERPL BCP-MCNC: 4.1 G/DL (ref 3.5–5.2)
ALP SERPL-CCNC: 61 U/L (ref 55–135)
ALT SERPL W/O P-5'-P-CCNC: 21 U/L (ref 10–44)
ANION GAP SERPL CALC-SCNC: 8 MMOL/L (ref 8–16)
AST SERPL-CCNC: 15 U/L (ref 10–40)
BILIRUB SERPL-MCNC: 0.4 MG/DL (ref 0.1–1)
BUN SERPL-MCNC: 10 MG/DL (ref 6–20)
C3 SERPL-MCNC: 127 MG/DL (ref 50–180)
C4 SERPL-MCNC: 20 MG/DL (ref 11–44)
CALCIUM SERPL-MCNC: 9.5 MG/DL (ref 8.7–10.5)
CHLORIDE SERPL-SCNC: 102 MMOL/L (ref 95–110)
CO2 SERPL-SCNC: 27 MMOL/L (ref 23–29)
CREAT SERPL-MCNC: 0.8 MG/DL (ref 0.5–1.4)
CRP SERPL-MCNC: 6.5 MG/L (ref 0–8.2)
ERYTHROCYTE [SEDIMENTATION RATE] IN BLOOD BY WESTERGREN METHOD: 43 MM/HR (ref 0–20)
EST. GFR  (AFRICAN AMERICAN): >60 ML/MIN/1.73 M^2
EST. GFR  (NON AFRICAN AMERICAN): >60 ML/MIN/1.73 M^2
ESTIMATED AVG GLUCOSE: 97 MG/DL (ref 68–131)
FOLATE SERPL-MCNC: 35.8 NG/ML (ref 4–24)
GLUCOSE SERPL-MCNC: 81 MG/DL (ref 70–110)
HBA1C MFR BLD: 5 % (ref 4–5.6)
POTASSIUM SERPL-SCNC: 4.1 MMOL/L (ref 3.5–5.1)
PROT SERPL-MCNC: 8 G/DL (ref 6–8.4)
SODIUM SERPL-SCNC: 137 MMOL/L (ref 136–145)
TSH SERPL DL<=0.005 MIU/L-ACNC: 0.8 UIU/ML (ref 0.4–4)
VIT B12 SERPL-MCNC: 327 PG/ML (ref 210–950)

## 2022-05-09 PROCEDURE — 86038 ANTINUCLEAR ANTIBODIES: CPT | Performed by: NURSE PRACTITIONER

## 2022-05-09 PROCEDURE — 86160 COMPLEMENT ANTIGEN: CPT | Performed by: NURSE PRACTITIONER

## 2022-05-09 PROCEDURE — 86803 HEPATITIS C AB TEST: CPT | Performed by: NURSE PRACTITIONER

## 2022-05-09 PROCEDURE — 82607 VITAMIN B-12: CPT | Performed by: NURSE PRACTITIONER

## 2022-05-09 PROCEDURE — 85651 RBC SED RATE NONAUTOMATED: CPT | Performed by: NURSE PRACTITIONER

## 2022-05-09 PROCEDURE — 80053 COMPREHEN METABOLIC PANEL: CPT | Performed by: NURSE PRACTITIONER

## 2022-05-09 PROCEDURE — 84443 ASSAY THYROID STIM HORMONE: CPT | Performed by: NURSE PRACTITIONER

## 2022-05-09 PROCEDURE — 83921 ORGANIC ACID SINGLE QUANT: CPT | Performed by: NURSE PRACTITIONER

## 2022-05-09 PROCEDURE — 82746 ASSAY OF FOLIC ACID SERUM: CPT | Performed by: NURSE PRACTITIONER

## 2022-05-09 PROCEDURE — 83036 HEMOGLOBIN GLYCOSYLATED A1C: CPT | Performed by: NURSE PRACTITIONER

## 2022-05-09 PROCEDURE — 86235 NUCLEAR ANTIGEN ANTIBODY: CPT | Performed by: NURSE PRACTITIONER

## 2022-05-09 PROCEDURE — 86160 COMPLEMENT ANTIGEN: CPT | Mod: 59 | Performed by: NURSE PRACTITIONER

## 2022-05-09 PROCEDURE — 36415 COLL VENOUS BLD VENIPUNCTURE: CPT | Performed by: NURSE PRACTITIONER

## 2022-05-09 PROCEDURE — 86140 C-REACTIVE PROTEIN: CPT | Performed by: NURSE PRACTITIONER

## 2022-05-10 LAB
ANA SER QL IF: NORMAL
ANTI-SSA ANTIBODY: 0.06 RATIO (ref 0–0.99)
ANTI-SSA INTERPRETATION: NEGATIVE

## 2022-05-13 LAB — METHYLMALONATE SERPL-SCNC: 0.22 UMOL/L

## 2022-05-14 ENCOUNTER — LAB VISIT (OUTPATIENT)
Dept: LAB | Facility: HOSPITAL | Age: 27
End: 2022-05-14
Attending: NURSE PRACTITIONER
Payer: MEDICAID

## 2022-05-14 DIAGNOSIS — R20.2 PARESTHESIA: Primary | ICD-10-CM

## 2022-05-14 PROCEDURE — 36415 COLL VENOUS BLD VENIPUNCTURE: CPT | Performed by: NURSE PRACTITIONER

## 2022-05-14 PROCEDURE — 84207 ASSAY OF VITAMIN B-6: CPT | Performed by: NURSE PRACTITIONER

## 2022-05-14 PROCEDURE — 84425 ASSAY OF VITAMIN B-1: CPT | Performed by: NURSE PRACTITIONER

## 2022-05-16 LAB — HCV AB SERPL QL IA: NEGATIVE

## 2022-05-19 LAB
PYRIDOXAL SERPL-MCNC: 41 UG/L (ref 5–50)
PYRIDOXAL SERPL-MCNC: 41 UG/L (ref 5–50)
VIT B1 BLD-MCNC: 69 UG/L (ref 38–122)
VIT B1 BLD-MCNC: 69 UG/L (ref 38–122)

## 2022-07-07 DIAGNOSIS — M54.50 LOW BACK PAIN, UNSPECIFIED: ICD-10-CM

## 2022-07-07 DIAGNOSIS — M79.10 MUSCLE ACHE: Primary | ICD-10-CM

## 2022-07-18 ENCOUNTER — HOSPITAL ENCOUNTER (EMERGENCY)
Facility: HOSPITAL | Age: 27
Discharge: HOME | End: 2022-07-18
Attending: EMERGENCY MEDICINE
Payer: MEDICAID

## 2022-07-18 VITALS
OXYGEN SATURATION: 100 % | BODY MASS INDEX: 31.39 KG/M2 | RESPIRATION RATE: 18 BRPM | DIASTOLIC BLOOD PRESSURE: 76 MMHG | SYSTOLIC BLOOD PRESSURE: 112 MMHG | WEIGHT: 200 LBS | TEMPERATURE: 99 F | HEIGHT: 67 IN | HEART RATE: 85 BPM

## 2022-07-18 DIAGNOSIS — N12 PYELONEPHRITIS: ICD-10-CM

## 2022-07-18 DIAGNOSIS — N39.0 URINARY TRACT INFECTION WITHOUT HEMATURIA, SITE UNSPECIFIED: Primary | ICD-10-CM

## 2022-07-18 DIAGNOSIS — R74.01 TRANSAMINITIS: ICD-10-CM

## 2022-07-18 LAB
ALBUMIN SERPL BCP-MCNC: 4 G/DL (ref 3.5–5.2)
ALP SERPL-CCNC: 88 U/L (ref 55–135)
ALT SERPL W/O P-5'-P-CCNC: 101 U/L (ref 10–44)
ANION GAP SERPL CALC-SCNC: 11 MMOL/L (ref 8–16)
AST SERPL-CCNC: 57 U/L (ref 10–40)
B-HCG UR QL: NEGATIVE
BACTERIA #/AREA URNS HPF: ABNORMAL /HPF
BASOPHILS # BLD AUTO: 0.04 K/UL (ref 0–0.2)
BASOPHILS NFR BLD: 0.6 % (ref 0–1.9)
BILIRUB SERPL-MCNC: 0.6 MG/DL (ref 0.1–1)
BILIRUB UR QL STRIP: NEGATIVE
BUN SERPL-MCNC: 8 MG/DL (ref 6–20)
CALCIUM SERPL-MCNC: 9.2 MG/DL (ref 8.7–10.5)
CHLORIDE SERPL-SCNC: 105 MMOL/L (ref 95–110)
CLARITY UR: CLEAR
CO2 SERPL-SCNC: 21 MMOL/L (ref 23–29)
COLOR UR: YELLOW
CREAT SERPL-MCNC: 0.7 MG/DL (ref 0.5–1.4)
DIFFERENTIAL METHOD: NORMAL
EOSINOPHIL # BLD AUTO: 0.1 K/UL (ref 0–0.5)
EOSINOPHIL NFR BLD: 1.5 % (ref 0–8)
ERYTHROCYTE [DISTWIDTH] IN BLOOD BY AUTOMATED COUNT: 13.1 % (ref 11.5–14.5)
EST. GFR  (AFRICAN AMERICAN): >60 ML/MIN/1.73 M^2
EST. GFR  (NON AFRICAN AMERICAN): >60 ML/MIN/1.73 M^2
GLUCOSE SERPL-MCNC: 82 MG/DL (ref 70–110)
GLUCOSE UR QL STRIP: NEGATIVE
HCT VFR BLD AUTO: 39.1 % (ref 37–48.5)
HGB BLD-MCNC: 12.7 G/DL (ref 12–16)
HGB UR QL STRIP: ABNORMAL
HYALINE CASTS #/AREA URNS LPF: 0 /LPF
IMM GRANULOCYTES # BLD AUTO: 0.01 K/UL (ref 0–0.04)
IMM GRANULOCYTES NFR BLD AUTO: 0.1 % (ref 0–0.5)
KETONES UR QL STRIP: NEGATIVE
LEUKOCYTE ESTERASE UR QL STRIP: ABNORMAL
LYMPHOCYTES # BLD AUTO: 1.7 K/UL (ref 1–4.8)
LYMPHOCYTES NFR BLD: 25.1 % (ref 18–48)
MCH RBC QN AUTO: 27.5 PG (ref 27–31)
MCHC RBC AUTO-ENTMCNC: 32.5 G/DL (ref 32–36)
MCV RBC AUTO: 85 FL (ref 82–98)
MICROSCOPIC COMMENT: ABNORMAL
MONOCYTES # BLD AUTO: 0.4 K/UL (ref 0.3–1)
MONOCYTES NFR BLD: 6.3 % (ref 4–15)
NEUTROPHILS # BLD AUTO: 4.5 K/UL (ref 1.8–7.7)
NEUTROPHILS NFR BLD: 66.4 % (ref 38–73)
NITRITE UR QL STRIP: NEGATIVE
NRBC BLD-RTO: 0 /100 WBC
PH UR STRIP: 6 [PH] (ref 5–8)
PLATELET # BLD AUTO: 297 K/UL (ref 150–450)
PMV BLD AUTO: 10.6 FL (ref 9.2–12.9)
POTASSIUM SERPL-SCNC: 3.9 MMOL/L (ref 3.5–5.1)
PROT SERPL-MCNC: 7.8 G/DL (ref 6–8.4)
PROT UR QL STRIP: ABNORMAL
RBC # BLD AUTO: 4.61 M/UL (ref 4–5.4)
RBC #/AREA URNS HPF: 20 /HPF (ref 0–4)
SODIUM SERPL-SCNC: 137 MMOL/L (ref 136–145)
SP GR UR STRIP: >=1.03 (ref 1–1.03)
SQUAMOUS #/AREA URNS HPF: 40 /HPF
URN SPEC COLLECT METH UR: ABNORMAL
UROBILINOGEN UR STRIP-ACNC: ABNORMAL EU/DL
WBC # BLD AUTO: 6.81 K/UL (ref 3.9–12.7)
WBC #/AREA URNS HPF: 2 /HPF (ref 0–5)

## 2022-07-18 PROCEDURE — 80053 COMPREHEN METABOLIC PANEL: CPT | Performed by: EMERGENCY MEDICINE

## 2022-07-18 PROCEDURE — 63600175 PHARM REV CODE 636 W HCPCS: Performed by: EMERGENCY MEDICINE

## 2022-07-18 PROCEDURE — 99285 EMERGENCY DEPT VISIT HI MDM: CPT | Mod: 25

## 2022-07-18 PROCEDURE — 81025 URINE PREGNANCY TEST: CPT | Performed by: EMERGENCY MEDICINE

## 2022-07-18 PROCEDURE — 96374 THER/PROPH/DIAG INJ IV PUSH: CPT

## 2022-07-18 PROCEDURE — 85025 COMPLETE CBC W/AUTO DIFF WBC: CPT | Performed by: EMERGENCY MEDICINE

## 2022-07-18 PROCEDURE — 96375 TX/PRO/DX INJ NEW DRUG ADDON: CPT | Mod: 59

## 2022-07-18 PROCEDURE — 80074 ACUTE HEPATITIS PANEL: CPT | Performed by: EMERGENCY MEDICINE

## 2022-07-18 PROCEDURE — 81000 URINALYSIS NONAUTO W/SCOPE: CPT | Performed by: EMERGENCY MEDICINE

## 2022-07-18 PROCEDURE — 36415 COLL VENOUS BLD VENIPUNCTURE: CPT | Performed by: EMERGENCY MEDICINE

## 2022-07-18 RX ORDER — CIPROFLOXACIN 500 MG/1
500 TABLET ORAL 2 TIMES DAILY
Qty: 20 TABLET | Refills: 0 | Status: SHIPPED | OUTPATIENT
Start: 2022-07-18 | End: 2022-07-18 | Stop reason: CLARIF

## 2022-07-18 RX ORDER — CEPHALEXIN 500 MG/1
500 CAPSULE ORAL 4 TIMES DAILY
Qty: 40 CAPSULE | Refills: 0 | Status: SHIPPED | OUTPATIENT
Start: 2022-07-18 | End: 2022-07-18 | Stop reason: SDUPTHER

## 2022-07-18 RX ORDER — KETOROLAC TROMETHAMINE 30 MG/ML
10 INJECTION, SOLUTION INTRAMUSCULAR; INTRAVENOUS
Status: COMPLETED | OUTPATIENT
Start: 2022-07-18 | End: 2022-07-18

## 2022-07-18 RX ORDER — ONDANSETRON 2 MG/ML
4 INJECTION INTRAMUSCULAR; INTRAVENOUS
Status: COMPLETED | OUTPATIENT
Start: 2022-07-18 | End: 2022-07-18

## 2022-07-18 RX ORDER — CEPHALEXIN 500 MG/1
500 CAPSULE ORAL 4 TIMES DAILY
Qty: 40 CAPSULE | Refills: 0 | Status: SHIPPED | OUTPATIENT
Start: 2022-07-18 | End: 2022-07-28

## 2022-07-18 RX ORDER — TRAMADOL HYDROCHLORIDE 50 MG/1
50 TABLET ORAL EVERY 6 HOURS PRN
Qty: 12 TABLET | Refills: 0 | Status: SHIPPED | OUTPATIENT
Start: 2022-07-18 | End: 2022-07-18 | Stop reason: SDUPTHER

## 2022-07-18 RX ORDER — TRAMADOL HYDROCHLORIDE 50 MG/1
50 TABLET ORAL EVERY 6 HOURS PRN
Qty: 12 TABLET | Refills: 0 | Status: ON HOLD | OUTPATIENT
Start: 2022-07-18 | End: 2022-08-22 | Stop reason: HOSPADM

## 2022-07-18 RX ADMIN — CEFTRIAXONE 1 G: 1 INJECTION, SOLUTION INTRAVENOUS at 11:07

## 2022-07-18 RX ADMIN — ONDANSETRON 4 MG: 2 INJECTION INTRAMUSCULAR; INTRAVENOUS at 10:07

## 2022-07-18 RX ADMIN — KETOROLAC TROMETHAMINE 10 MG: 30 INJECTION, SOLUTION INTRAMUSCULAR; INTRAVENOUS at 10:07

## 2022-07-18 NOTE — DISCHARGE INSTRUCTIONS
In addition to a suspected kidney infection for which your getting pain medicines and antibiotics.  Your liver enzymes were elevated.  Acute hep panel has been sent.  Please follow-up with your primary care doctor for repeat liver enzyme evaluation.  Avoid Tylenol containing products.

## 2022-07-18 NOTE — ED PROVIDER NOTES
Encounter Date: 7/18/2022       History     Chief Complaint   Patient presents with    Flank Pain     Right side     Hematuria     HPI 26-year-old woman presents emergency department for evaluation of right flank pain with associated hematuria that began yesterday around mid day.  Symptoms are waxing and waning, worsening associated with episode of emesis and severe pain.  No significant alleviating treatments.  No fever.  No dysuria.  Review of patient's allergies indicates:  No Known Allergies  Past Medical History:   Diagnosis Date    Abnormal Pap smear of cervix     repeat pap    Bronchitis     COVID 01/18/2022    Family history of pulmonary embolism     MOTHER with MTHFR, PE found after knee replacement.     Iron deficiency anemia 5/17/2018    Pneumonia      Past Surgical History:   Procedure Laterality Date    WISDOM TOOTH EXTRACTION  11/15/2019     Family History   Problem Relation Age of Onset    Breast cancer Maternal Grandmother     Lymphoma Maternal Grandmother     Cervical cancer Maternal Grandmother     Pulmonary embolism Mother         MTHFR    Glaucoma Neg Hx     Retinal detachment Neg Hx     Macular degeneration Neg Hx     Ovarian cancer Neg Hx      Social History     Tobacco Use    Smoking status: Never Smoker    Smokeless tobacco: Never Used   Substance Use Topics    Alcohol use: Yes    Drug use: No     Review of Systems   Constitutional: Negative for fever.   HENT: Negative for sore throat.    Respiratory: Negative for shortness of breath.    Cardiovascular: Negative for chest pain.   Gastrointestinal: Negative for nausea.   Genitourinary: Positive for flank pain and hematuria. Negative for dysuria.   Musculoskeletal: Negative for back pain.   Skin: Negative for rash.   Neurological: Negative for weakness.   Hematological: Does not bruise/bleed easily.       Physical Exam     Initial Vitals [07/18/22 0856]   BP Pulse Resp Temp SpO2   122/76 88 18 98.7 °F (37.1 °C) 97 %       MAP       --         Physical Exam    Constitutional: Vital signs are normal. She appears well-developed and well-nourished.  Non-toxic appearance. No distress.   HENT:   Head: Normocephalic and atraumatic.   Eyes: EOM are normal. Pupils are equal, round, and reactive to light.   Neck: Neck supple. No JVD present.   Normal range of motion.  Cardiovascular: Normal rate, regular rhythm, normal heart sounds and intact distal pulses. Exam reveals no gallop and no friction rub.    No murmur heard.  Pulmonary/Chest: Breath sounds normal. She has no wheezes. She has no rhonchi. She has no rales.   Abdominal: Abdomen is soft. Bowel sounds are normal. There is no abdominal tenderness.   There is right CVA tenderness.There is no rebound and no guarding.   Musculoskeletal:         General: Normal range of motion.      Cervical back: Normal range of motion and neck supple. No rigidity.     Neurological: She is alert and oriented to person, place, and time. She has normal strength and normal reflexes. No cranial nerve deficit or sensory deficit. She exhibits normal muscle tone. Coordination normal. GCS eye subscore is 4. GCS verbal subscore is 5. GCS motor subscore is 6.   Skin: Skin is warm and dry.   Psychiatric: She has a normal mood and affect. Her speech is normal and behavior is normal. She is not actively hallucinating.         ED Course   Procedures  Labs Reviewed   URINALYSIS, REFLEX TO URINE CULTURE - Abnormal; Notable for the following components:       Result Value    Specific Gravity, UA >=1.030 (*)     Protein, UA 1+ (*)     Occult Blood UA 3+ (*)     Urobilinogen, UA 2.0-3.0 (*)     Leukocytes, UA Trace (*)     All other components within normal limits    Narrative:     Specimen Source->Urine   COMPREHENSIVE METABOLIC PANEL - Abnormal; Notable for the following components:    CO2 21 (*)     AST 57 (*)      (*)     All other components within normal limits   URINALYSIS MICROSCOPIC - Abnormal; Notable for  the following components:    RBC, UA 20 (*)     Bacteria Many (*)     All other components within normal limits    Narrative:     Specimen Source->Urine   PREGNANCY TEST, URINE RAPID    Narrative:     Specimen Source->Urine   CBC W/ AUTO DIFFERENTIAL   HEPATITIS PANEL, ACUTE          Imaging Results          CT Renal Stone Study ABD Pelvis WO (Final result)  Result time 07/18/22 10:26:10    Final result by Tierra Robin MD (07/18/22 10:26:10)                 Impression:      Findings suspicious for acute cholecystitis and recommend ultrasound for further evaluation      Electronically signed by: Tierra Robin MD  Date:    07/18/2022  Time:    10:26             Narrative:    EXAMINATION:  CT RENAL STONE STUDY ABD PELVIS WO    CLINICAL HISTORY:  Flank pain, kidney stone suspected;    TECHNIQUE:  Low dose axial images, sagittal and coronal reformations were obtained from the lung bases to the pubic symphysis.  Contrast was not administered.    COMPARISON:  None    FINDINGS:  And hypoventilatory changes in visualized lung bases    Liver mild diffuse fatty infiltration.    Gallbladder prominent in size with the wall indistinct suggesting wall thickening.  No calcified stones seen within it although cholelithiasis and gallbladder typically better evaluate by ultrasound.  There is trace fluid around the caudal aspect of the gallbladder in right anterior pararenal space.  No biliary duct dilatation evident    Spleen not enlarged    Adrenal glands unremarkable appearance    Pancreas unremarkable appearance    Abdominal aorta no aneurysm    There is a small fat containing umbilical hernia and with diastasis of the rectus muscles and bowel protruding anteriorly toward the hernia.  No bowel wall thickening or obstructive or inflammatory change.  Normal appearance of the appendix.  Prominent amount of stool within the rectum.  Mild diverticulosis versus incomplete distention of colon without CT findings of acute  diverticulitis.    Kidneys unremarkable appearance.  No hydronephrosis opaque renal stone.  No ureteral dilatation opaque ureteral stone or ureteral obstruction evident.  Small calcifications the pelvis suggest phleboliths.  Urinary bladder mildly distended at time of the exam and unremarkable appearance.                                 Medications   ketorolac injection 9.999 mg (9.999 mg Intravenous Given 7/18/22 1028)   ondansetron injection 4 mg (4 mg Intravenous Given 7/18/22 1028)   cefTRIAXone (ROCEPHIN) 1 g/50 mL D5W IVPB (1 g Intravenous New Bag 7/18/22 1135)     Medical Decision Making:   History:   Old Medical Records: I decided to obtain old medical records.  Initial Assessment:   26-year-old woman presents emergency department for evaluation of right flank pain with associated hematuria that began yesterday around mid day.  Symptoms are waxing and waning, worsening associated with episode of emesis and severe pain.  No significant alleviating treatments.  No fever.  No dysuria.  Afebrile well-appearing and not toxic in no acute distress.  No abdominal tenderness on exam.  Negative Bearden sign.  She does have right CVA tenderness.  Urinalysis appears to be contaminated but may be consistent with UTI given many bacteria and hematuria.  CT abdomen pelvis performed showed no evidence of obstructive uropathy or ureterolithiasis.  Findings suspicion for cholecystitis given prominent gallbladder incised with indistinct wall suggesting wall thickening but no calcified stones.  Trace fluid around the caudal aspect of the gallbladder but no biliary ductal dilation evident.  Patient has no elevation in her bilirubin and no right upper quadrant tenderness, have very low suspicion for acute cholecystitis.  Patient will be started on antibiotics for suspected pyelonephritis.  No evidence of acute appendicitis.  Return precautions discussed for worsening fever, failed improved on antibiotics or any worsening condition.   Patient discharged in no acute distress.  Patient to follow-up with her PCP.                      Clinical Impression:   Final diagnoses:  [N39.0] Urinary tract infection without hematuria, site unspecified (Primary)  [N12] Pyelonephritis  [R74.01] Transaminitis          ED Disposition Condition    Discharge Stable        ED Prescriptions     Medication Sig Dispense Start Date End Date Auth. Provider    ciprofloxacin HCl (CIPRO) 500 MG tablet  (Status: Discontinued) Take 1 tablet (500 mg total) by mouth 2 (two) times daily. for 10 days 20 tablet 7/18/2022 7/18/2022 Harsihl Marin MD    traMADoL (ULTRAM) 50 mg tablet  (Status: Discontinued) Take 1 tablet (50 mg total) by mouth every 6 (six) hours as needed for Pain. 12 tablet 7/18/2022 7/18/2022 Harshil Marin MD    cephALEXin (KEFLEX) 500 MG capsule  (Status: Discontinued) Take 1 capsule (500 mg total) by mouth 4 (four) times daily. for 10 days 40 capsule 7/18/2022 7/18/2022 Harshil Marin MD    cephALEXin (KEFLEX) 500 MG capsule Take 1 capsule (500 mg total) by mouth 4 (four) times daily. for 10 days 40 capsule 7/18/2022 7/28/2022 Harshil Marin MD    traMADoL (ULTRAM) 50 mg tablet Take 1 tablet (50 mg total) by mouth every 6 (six) hours as needed for Pain. 12 tablet 7/18/2022  Harshil Marin MD        Follow-up Information     Follow up With Specialties Details Why Contact [facility] Emergency Dept Emergency Medicine  As needed, If symptoms worsen [address] [place] [id number] [phone]    Francisco Camargo III, MD Family Medicine Schedule an appointment as soon as possible for a visit in 1 week  [address] [place] [address]  [address]  [facility] [id number] [phone]             Harshil Marin MD  07/18/22 8282

## 2022-07-18 NOTE — ED NOTES
Assumed care:  Mena Ovalle is awake, alert and oriented x 3, skin warm and dry, in NAD.  CO right flank pain with N&V that started yesterday.  States that she was told by [facility] she had possible kidney stone 5-6 months ago.    Patient identifiers for Mena Ovalle checked and correct.  LOC:  Mena Ovalle is awake, alert, and aware of environment with an appropriate affect. She is oriented x 3 and speaking appropriately.  APPEARANCE:  She is resting comfortably and in no acute distress. She is clean and well groomed, patient's clothing is properly fastened.  SKIN:  The skin is warm and dry. She has normal skin turgor and moist mucus membranes. Skin is intact; no bruising or breakdown noted.  MUSCULOSKELETAL:  She is moving all extremities well, no obvious deformities noted. Pulses intact.   RESPIRATORY:  Airway is open and patent. Respirations are spontaneous and non-labored with normal effort and rate.  CARDIAC:  She has a normal rate and rhythm. No peripheral edema noted. Capillary refill < 3 seconds.  ABDOMEN:  No distention noted.  Soft and non-tender upon palpation.  Right flank pain with N&V  NEUROLOGICAL:  PERRL. Facial expression is symmetrical. Hand grasps are equal bilaterally. Normal sensation in all extremities when touched with finger.  Allergies reported:  Review of patient's allergies indicates:  No Known Allergies  OTHER NOTES:

## 2022-07-19 LAB
HAV IGM SERPL QL IA: NEGATIVE
HBV CORE IGM SERPL QL IA: NEGATIVE
HBV SURFACE AG SERPL QL IA: NEGATIVE
HCV AB SERPL QL IA: NEGATIVE

## 2022-08-07 ENCOUNTER — HOSPITAL ENCOUNTER (INPATIENT)
Facility: HOSPITAL | Age: 27
LOS: 5 days | Discharge: HOME | DRG: 418 | End: 2022-08-12
Attending: EMERGENCY MEDICINE | Admitting: INTERNAL MEDICINE
Payer: MEDICAID

## 2022-08-07 DIAGNOSIS — E80.6 HYPERBILIRUBINEMIA: Primary | ICD-10-CM

## 2022-08-07 DIAGNOSIS — R07.9 CHEST PAIN: ICD-10-CM

## 2022-08-07 DIAGNOSIS — K80.43 CHOLEDOCHOLITHIASIS WITH ACUTE CHOLECYSTITIS WITH OBSTRUCTION: ICD-10-CM

## 2022-08-07 DIAGNOSIS — K81.0 ACUTE CHOLECYSTITIS: ICD-10-CM

## 2022-08-07 DIAGNOSIS — R74.01 TRANSAMINITIS: ICD-10-CM

## 2022-08-07 LAB
ALBUMIN SERPL BCP-MCNC: 4.1 G/DL (ref 3.5–5.2)
ALP SERPL-CCNC: 94 U/L (ref 55–135)
ALT SERPL W/O P-5'-P-CCNC: 36 U/L (ref 10–44)
AMORPH CRY URNS QL MICRO: ABNORMAL
ANION GAP SERPL CALC-SCNC: 10 MMOL/L (ref 8–16)
AST SERPL-CCNC: 48 U/L (ref 10–40)
B-HCG UR QL: NEGATIVE
BACTERIA #/AREA URNS HPF: ABNORMAL /HPF
BASOPHILS # BLD AUTO: 0.05 K/UL (ref 0–0.2)
BASOPHILS NFR BLD: 0.3 % (ref 0–1.9)
BILIRUB SERPL-MCNC: 0.7 MG/DL (ref 0.1–1)
BILIRUB UR QL STRIP: NEGATIVE
BUN SERPL-MCNC: 10 MG/DL (ref 6–20)
CALCIUM SERPL-MCNC: 9.4 MG/DL (ref 8.7–10.5)
CHLORIDE SERPL-SCNC: 104 MMOL/L (ref 95–110)
CLARITY UR: CLEAR
CO2 SERPL-SCNC: 24 MMOL/L (ref 23–29)
COLOR UR: YELLOW
CREAT SERPL-MCNC: 0.8 MG/DL (ref 0.5–1.4)
DIFFERENTIAL METHOD: ABNORMAL
EOSINOPHIL # BLD AUTO: 0.1 K/UL (ref 0–0.5)
EOSINOPHIL NFR BLD: 0.4 % (ref 0–8)
ERYTHROCYTE [DISTWIDTH] IN BLOOD BY AUTOMATED COUNT: 12.9 % (ref 11.5–14.5)
EST. GFR  (NO RACE VARIABLE): >60 ML/MIN/1.73 M^2
GLUCOSE SERPL-MCNC: 96 MG/DL (ref 70–110)
GLUCOSE UR QL STRIP: NEGATIVE
HCT VFR BLD AUTO: 38.4 % (ref 37–48.5)
HGB BLD-MCNC: 12.6 G/DL (ref 12–16)
HGB UR QL STRIP: ABNORMAL
IMM GRANULOCYTES # BLD AUTO: 0.04 K/UL (ref 0–0.04)
IMM GRANULOCYTES NFR BLD AUTO: 0.3 % (ref 0–0.5)
KETONES UR QL STRIP: ABNORMAL
LEUKOCYTE ESTERASE UR QL STRIP: NEGATIVE
LYMPHOCYTES # BLD AUTO: 1.4 K/UL (ref 1–4.8)
LYMPHOCYTES NFR BLD: 9.5 % (ref 18–48)
MCH RBC QN AUTO: 27.6 PG (ref 27–31)
MCHC RBC AUTO-ENTMCNC: 32.8 G/DL (ref 32–36)
MCV RBC AUTO: 84 FL (ref 82–98)
MICROSCOPIC COMMENT: ABNORMAL
MONOCYTES # BLD AUTO: 1.1 K/UL (ref 0.3–1)
MONOCYTES NFR BLD: 7 % (ref 4–15)
NEUTROPHILS # BLD AUTO: 12.4 K/UL (ref 1.8–7.7)
NEUTROPHILS NFR BLD: 82.5 % (ref 38–73)
NITRITE UR QL STRIP: NEGATIVE
NRBC BLD-RTO: 0 /100 WBC
PH UR STRIP: >8 [PH] (ref 5–8)
PLATELET # BLD AUTO: 263 K/UL (ref 150–450)
PMV BLD AUTO: 11.6 FL (ref 9.2–12.9)
POTASSIUM SERPL-SCNC: 3.9 MMOL/L (ref 3.5–5.1)
PROT SERPL-MCNC: 8 G/DL (ref 6–8.4)
PROT UR QL STRIP: ABNORMAL
RBC # BLD AUTO: 4.56 M/UL (ref 4–5.4)
RBC #/AREA URNS HPF: 12 /HPF (ref 0–4)
SARS-COV-2 RDRP RESP QL NAA+PROBE: NEGATIVE
SODIUM SERPL-SCNC: 138 MMOL/L (ref 136–145)
SP GR UR STRIP: 1.01 (ref 1–1.03)
SQUAMOUS #/AREA URNS HPF: 4 /HPF
URN SPEC COLLECT METH UR: ABNORMAL
UROBILINOGEN UR STRIP-ACNC: ABNORMAL EU/DL
WBC # BLD AUTO: 15.02 K/UL (ref 3.9–12.7)
WBC #/AREA URNS HPF: 1 /HPF (ref 0–5)

## 2022-08-07 PROCEDURE — 12000002 HC ACUTE/MED SURGE SEMI-PRIVATE ROOM

## 2022-08-07 PROCEDURE — G0378 HOSPITAL OBSERVATION PER HR: HCPCS

## 2022-08-07 PROCEDURE — 25000003 PHARM REV CODE 250: Performed by: EMERGENCY MEDICINE

## 2022-08-07 PROCEDURE — 81000 URINALYSIS NONAUTO W/SCOPE: CPT | Performed by: EMERGENCY MEDICINE

## 2022-08-07 PROCEDURE — 80053 COMPREHEN METABOLIC PANEL: CPT | Performed by: EMERGENCY MEDICINE

## 2022-08-07 PROCEDURE — U0002 COVID-19 LAB TEST NON-CDC: HCPCS | Performed by: NURSE PRACTITIONER

## 2022-08-07 PROCEDURE — 63600175 PHARM REV CODE 636 W HCPCS: Performed by: EMERGENCY MEDICINE

## 2022-08-07 PROCEDURE — 81025 URINE PREGNANCY TEST: CPT | Performed by: EMERGENCY MEDICINE

## 2022-08-07 PROCEDURE — 94761 N-INVAS EAR/PLS OXIMETRY MLT: CPT

## 2022-08-07 PROCEDURE — 85025 COMPLETE CBC W/AUTO DIFF WBC: CPT | Performed by: EMERGENCY MEDICINE

## 2022-08-07 PROCEDURE — 36415 COLL VENOUS BLD VENIPUNCTURE: CPT | Performed by: EMERGENCY MEDICINE

## 2022-08-07 PROCEDURE — 99900035 HC TECH TIME PER 15 MIN (STAT)

## 2022-08-07 PROCEDURE — 63600175 PHARM REV CODE 636 W HCPCS: Performed by: SURGERY

## 2022-08-07 PROCEDURE — 99285 EMERGENCY DEPT VISIT HI MDM: CPT | Mod: 25

## 2022-08-07 PROCEDURE — 25000003 PHARM REV CODE 250: Performed by: SURGERY

## 2022-08-07 PROCEDURE — 63600175 PHARM REV CODE 636 W HCPCS: Performed by: NURSE PRACTITIONER

## 2022-08-07 PROCEDURE — 96365 THER/PROPH/DIAG IV INF INIT: CPT

## 2022-08-07 PROCEDURE — 25000003 PHARM REV CODE 250: Performed by: NURSE PRACTITIONER

## 2022-08-07 RX ORDER — LANOLIN ALCOHOL/MO/W.PET/CERES
800 CREAM (GRAM) TOPICAL
Status: DISCONTINUED | OUTPATIENT
Start: 2022-08-07 | End: 2022-08-12 | Stop reason: HOSPADM

## 2022-08-07 RX ORDER — IBUPROFEN 200 MG
16 TABLET ORAL
Status: DISCONTINUED | OUTPATIENT
Start: 2022-08-07 | End: 2022-08-12 | Stop reason: HOSPADM

## 2022-08-07 RX ORDER — IBUPROFEN 200 MG
24 TABLET ORAL
Status: DISCONTINUED | OUTPATIENT
Start: 2022-08-07 | End: 2022-08-12 | Stop reason: HOSPADM

## 2022-08-07 RX ORDER — TALC
6 POWDER (GRAM) TOPICAL NIGHTLY PRN
Status: DISCONTINUED | OUTPATIENT
Start: 2022-08-07 | End: 2022-08-12 | Stop reason: HOSPADM

## 2022-08-07 RX ORDER — SODIUM CHLORIDE 9 MG/ML
INJECTION, SOLUTION INTRAVENOUS CONTINUOUS
Status: DISCONTINUED | OUTPATIENT
Start: 2022-08-08 | End: 2022-08-11

## 2022-08-07 RX ORDER — AMOXICILLIN 250 MG
1 CAPSULE ORAL 2 TIMES DAILY
Status: DISCONTINUED | OUTPATIENT
Start: 2022-08-07 | End: 2022-08-12 | Stop reason: HOSPADM

## 2022-08-07 RX ORDER — SODIUM,POTASSIUM PHOSPHATES 280-250MG
2 POWDER IN PACKET (EA) ORAL
Status: DISCONTINUED | OUTPATIENT
Start: 2022-08-07 | End: 2022-08-12 | Stop reason: HOSPADM

## 2022-08-07 RX ORDER — ACETAMINOPHEN 500 MG
1000 TABLET ORAL
Status: COMPLETED | OUTPATIENT
Start: 2022-08-07 | End: 2022-08-07

## 2022-08-07 RX ORDER — SODIUM CHLORIDE 0.9 % (FLUSH) 0.9 %
10 SYRINGE (ML) INJECTION EVERY 12 HOURS PRN
Status: DISCONTINUED | OUTPATIENT
Start: 2022-08-07 | End: 2022-08-12 | Stop reason: HOSPADM

## 2022-08-07 RX ORDER — ONDANSETRON 2 MG/ML
4 INJECTION INTRAMUSCULAR; INTRAVENOUS EVERY 8 HOURS PRN
Status: DISCONTINUED | OUTPATIENT
Start: 2022-08-07 | End: 2022-08-12 | Stop reason: HOSPADM

## 2022-08-07 RX ORDER — SIMETHICONE 80 MG
1 TABLET,CHEWABLE ORAL 4 TIMES DAILY PRN
Status: DISCONTINUED | OUTPATIENT
Start: 2022-08-07 | End: 2022-08-09

## 2022-08-07 RX ORDER — GABAPENTIN 100 MG/1
100 CAPSULE ORAL 2 TIMES DAILY
COMMUNITY
End: 2022-08-22

## 2022-08-07 RX ORDER — ACETAMINOPHEN 325 MG/1
650 TABLET ORAL EVERY 8 HOURS PRN
Status: DISCONTINUED | OUTPATIENT
Start: 2022-08-07 | End: 2022-08-12 | Stop reason: HOSPADM

## 2022-08-07 RX ORDER — MAG HYDROX/ALUMINUM HYD/SIMETH 200-200-20
30 SUSPENSION, ORAL (FINAL DOSE FORM) ORAL 4 TIMES DAILY PRN
Status: DISCONTINUED | OUTPATIENT
Start: 2022-08-07 | End: 2022-08-12 | Stop reason: HOSPADM

## 2022-08-07 RX ORDER — IPRATROPIUM BROMIDE AND ALBUTEROL SULFATE 2.5; .5 MG/3ML; MG/3ML
3 SOLUTION RESPIRATORY (INHALATION) EVERY 4 HOURS PRN
Status: DISCONTINUED | OUTPATIENT
Start: 2022-08-07 | End: 2022-08-12 | Stop reason: HOSPADM

## 2022-08-07 RX ORDER — GLUCAGON 1 MG
1 KIT INJECTION
Status: DISCONTINUED | OUTPATIENT
Start: 2022-08-07 | End: 2022-08-12 | Stop reason: HOSPADM

## 2022-08-07 RX ORDER — NALOXONE HCL 0.4 MG/ML
0.02 VIAL (ML) INJECTION
Status: DISCONTINUED | OUTPATIENT
Start: 2022-08-07 | End: 2022-08-12 | Stop reason: HOSPADM

## 2022-08-07 RX ORDER — KETOROLAC TROMETHAMINE 30 MG/ML
15 INJECTION, SOLUTION INTRAMUSCULAR; INTRAVENOUS EVERY 6 HOURS PRN
Status: DISPENSED | OUTPATIENT
Start: 2022-08-07 | End: 2022-08-10

## 2022-08-07 RX ADMIN — PIPERACILLIN SODIUM AND TAZOBACTAM SODIUM 4.5 G: 4; .5 INJECTION, POWDER, LYOPHILIZED, FOR SOLUTION INTRAVENOUS at 10:08

## 2022-08-07 RX ADMIN — PIPERACILLIN SODIUM AND TAZOBACTAM SODIUM 4.5 G: 4; .5 INJECTION, POWDER, LYOPHILIZED, FOR SOLUTION INTRAVENOUS at 02:08

## 2022-08-07 RX ADMIN — SENNOSIDES AND DOCUSATE SODIUM 1 TABLET: 50; 8.6 TABLET ORAL at 09:08

## 2022-08-07 RX ADMIN — ACETAMINOPHEN 1000 MG: 500 TABLET ORAL at 02:08

## 2022-08-07 RX ADMIN — KETOROLAC TROMETHAMINE 15 MG: 30 INJECTION, SOLUTION INTRAMUSCULAR; INTRAVENOUS at 05:08

## 2022-08-07 NOTE — H&P
[facility] Emergency Dept  [facility] Medicine  History & Physical    Patient Name: Mena Ovalle  MRN: [id number]  Patient Class: OP- Observation  Admission Date: 8/7/2022  Attending Physician: Riky Ayala MD  Primary Care Provider: Primary Doctor No         Patient information was obtained from patient and ER records.     Subjective:     Principal Problem:Acute cholecystitis    Chief Complaint:   Chief Complaint   Patient presents with    Flank Pain      Rt. Side  / started this am    Fall        HPI: Mena Ovalle is a 27 y/o female with no significant past medical history who presented to the emergency department with complaints of right upper quadrant abdominal pain accompanied by nausea and vomiting which began earlier this morning.  She had a similar episode about a week ago which resolved.  Denies recent fever, chest pain, diarrhea, dysuria, or any other symptoms.  CT abdomen and pelvis concerning for acute cholecystitis.  She will be placed in observation under the service of hospital Medicine.  General surgery has been consulted and plan surgical procedure tomorrow.      Past Medical History:   Diagnosis Date    Abnormal Pap smear of cervix     repeat pap    Bronchitis     COVID 01/18/2022    Family history of pulmonary embolism     MOTHER with MTHFR, PE found after knee replacement.     Iron deficiency anemia 5/17/2018    Pneumonia        Past Surgical History:   Procedure Laterality Date    WISDOM TOOTH EXTRACTION  11/15/2019       Review of patient's allergies indicates:  No Known Allergies    No current facility-administered medications on file prior to encounter.     Current Outpatient Medications on File Prior to Encounter   Medication Sig    traMADoL (ULTRAM) 50 mg tablet Take 1 tablet (50 mg total) by mouth every 6 (six) hours as needed for Pain.     Family History       Problem Relation (Age of Onset)    Breast cancer Maternal Grandmother    Cervical cancer Maternal  Grandmother    Lymphoma Maternal Grandmother    Pulmonary embolism Mother          Tobacco Use    Smoking status: Never Smoker    Smokeless tobacco: Never Used   Substance and Sexual Activity    Alcohol use: Yes    Drug use: No    Sexual activity: Yes     Partners: Male     Review of Systems   Constitutional:  Negative for appetite change, chills, fatigue and fever.   HENT:  Negative for sore throat and trouble swallowing.    Respiratory:  Negative for cough, shortness of breath and wheezing.    Cardiovascular:  Negative for chest pain and palpitations.   Gastrointestinal:  Positive for abdominal pain, nausea and vomiting. Negative for diarrhea.   Genitourinary:  Negative for dysuria and urgency.   Musculoskeletal:  Positive for back pain (chronic). Negative for arthralgias.   Skin:  Negative for pallor and rash.   Neurological:  Negative for weakness and light-headedness.   Psychiatric/Behavioral:  Negative for agitation and confusion.    All other systems reviewed and are negative.  Objective:     Vital Signs (Most Recent):  Temp: 98.2 °F (36.8 °C) (08/07/22 1159)  Pulse: 78 (08/07/22 1242)  Resp: 18 (08/07/22 1242)  BP: 119/75 (08/07/22 1242)  SpO2: 100 % (08/07/22 1242) Vital Signs (24h Range):  Temp:  [98.2 °F (36.8 °C)] 98.2 °F (36.8 °C)  Pulse:  [78-85] 78  Resp:  [18] 18  SpO2:  [83 %-100 %] 100 %  BP: (115-119)/(75) 119/75     Weight: 90.7 kg (200 lb)  Body mass index is 31.32 kg/m².    Physical Exam  Constitutional:       General: She is not in acute distress.     Appearance: She is well-developed. She is not ill-appearing.   HENT:      Head: Normocephalic and atraumatic.   Eyes:      Conjunctiva/sclera: Conjunctivae normal.      Pupils: Pupils are equal, round, and reactive to light.   Cardiovascular:      Rate and Rhythm: Normal rate and regular rhythm.      Heart sounds: Normal heart sounds.   Pulmonary:      Effort: Pulmonary effort is normal. No respiratory distress.      Breath sounds: Normal  breath sounds.   Abdominal:      General: Bowel sounds are normal. There is no distension.      Palpations: Abdomen is soft.      Tenderness: There is abdominal tenderness (RUQ).   Musculoskeletal:         General: Normal range of motion.      Cervical back: Normal range of motion and neck supple.   Skin:     General: Skin is warm and dry.      Capillary Refill: Capillary refill takes 2 to 3 seconds.   Neurological:      General: No focal deficit present.      Mental Status: She is alert and oriented to person, place, and time.   Psychiatric:         Mood and Affect: Mood normal.         Behavior: Behavior normal.         Thought Content: Thought content normal.         Judgment: Judgment normal.         CRANIAL NERVES     CN III, IV, VI   Pupils are equal, round, and reactive to light.     Significant Labs: All pertinent labs within the past 24 hours have been reviewed.  CBC:   Recent Labs   Lab 08/07/22  1213   WBC 15.02*   HGB 12.6   HCT 38.4        CMP:   Recent Labs   Lab 08/07/22  1213      K 3.9      CO2 24   GLU 96   BUN 10   CREATININE 0.8   CALCIUM 9.4   PROT 8.0   ALBUMIN 4.1   BILITOT 0.7   ALKPHOS 94   AST 48*   ALT 36   ANIONGAP 10     Urine Studies:   Recent Labs   Lab 08/07/22  1241   COLORU Yellow   APPEARANCEUA Clear   PHUR >8.0*   SPECGRAV 1.015   PROTEINUA Trace*   GLUCUA Negative   KETONESU Trace*   BILIRUBINUA Negative   OCCULTUA 2+*   NITRITE Negative   UROBILINOGEN 2.0-3.0*   LEUKOCYTESUR Negative   RBCUA 12*   WBCUA 1   BACTERIA Few*   SQUAMEPITHEL 4       Significant Imaging: I have reviewed all pertinent imaging results/findings within the past 24 hours.  CT abd/pelvis renal stone protocol:  Edematous, thick-walled and distended gallbladder, concerning for acute cholecystitis.    Assessment/Plan:     * Acute cholecystitis  Consult general surgery  NPO after midnight  Pain control  IV antiemetics as needed      VTE Risk Mitigation (From admission, onward)    SCDs              Estefania Hernandez, JADEP  Department of Hospital Medicine   [facility] - Emergency Dept

## 2022-08-07 NOTE — ED PROVIDER NOTES
Encounter Date: 8/7/2022    SCRIBE #1 NOTE: I, Yue Landeros, am scribing for, and in the presence of, Israel Jauregui MD.       History     Chief Complaint   Patient presents with    Flank Pain      Rt. Side  / started this am    Fall     Time seen by provider: 11:58 AM on 08/07/2022    Mena Ovalle is a 26 y.o. female with a PMHx of kidney stones presenting to the ED via EMS with an onset of n/v with waxing and waning low back pain that radiates to her right flank starting 2.5 hours ago. When this pain started, patient got into the bath which exacerbated her pain and when she stood up she fell onto the ground. She denies head injury upon impact. The patient took Tramadol and a muscle relaxer with minimal relief of her Sx. She was given 50 mcg fentanyl by EMS en route with some relief. The patient denies recent fever, hematuria, dysuria, diarrhea, constipation, or any other Sx at this time. No pertinent PSHx.    The history is provided by the patient.     Review of patient's allergies indicates:  No Known Allergies  Past Medical History:   Diagnosis Date    Abnormal Pap smear of cervix     repeat pap    Bronchitis     COVID 01/18/2022    Family history of pulmonary embolism     MOTHER with MTHFR, PE found after knee replacement.     Iron deficiency anemia 5/17/2018    Pneumonia      Past Surgical History:   Procedure Laterality Date    WISDOM TOOTH EXTRACTION  11/15/2019     Family History   Problem Relation Age of Onset    Breast cancer Maternal Grandmother     Lymphoma Maternal Grandmother     Cervical cancer Maternal Grandmother     Pulmonary embolism Mother         MTHFR    Glaucoma Neg Hx     Retinal detachment Neg Hx     Macular degeneration Neg Hx     Ovarian cancer Neg Hx      Social History     Tobacco Use    Smoking status: Never Smoker    Smokeless tobacco: Never Used   Substance Use Topics    Alcohol use: Yes    Drug use: No     Review of Systems   Constitutional:  Negative for activity change, diaphoresis and fever.   HENT: Negative for ear pain, rhinorrhea, sore throat and trouble swallowing.    Eyes: Negative for pain and visual disturbance.   Respiratory: Negative for cough, shortness of breath and stridor.    Cardiovascular: Negative for chest pain.   Gastrointestinal: Positive for nausea and vomiting. Negative for abdominal pain, blood in stool, constipation and diarrhea.   Genitourinary: Positive for flank pain. Negative for dysuria, hematuria, vaginal bleeding and vaginal discharge.   Musculoskeletal: Positive for back pain. Negative for gait problem.   Skin: Negative for rash and wound.   Neurological: Negative for seizures and headaches.   Psychiatric/Behavioral: Negative for hallucinations and suicidal ideas.       Physical Exam     Initial Vitals [08/07/22 1159]   BP Pulse Resp Temp SpO2   115/75 83 18 98.2 °F (36.8 °C) (!) 83 %      MAP       --         Physical Exam    Nursing note and vitals reviewed.  Constitutional: She appears well-developed. She is not diaphoretic. No distress.   HENT:   Head: Normocephalic and atraumatic.   Nose: Nose normal.   Eyes: EOM are normal. Pupils are equal, round, and reactive to light. No scleral icterus.   Neck: Neck supple.   Normal range of motion.  Cardiovascular: Normal rate, regular rhythm, normal heart sounds and intact distal pulses. Exam reveals no gallop and no friction rub.    No murmur heard.  Pulmonary/Chest: Breath sounds normal. No stridor. No respiratory distress. She has no wheezes. She has no rhonchi. She has no rales.   Abdominal: Abdomen is soft. Bowel sounds are normal. She exhibits no distension. There is abdominal tenderness (RUQ pain).   Positive Bearden's sign   There is right CVA tenderness.There is guarding (voluntary). There is no rebound.   Musculoskeletal:         General: Normal range of motion.      Cervical back: Normal range of motion and neck supple.     Neurological: She is alert and oriented to  person, place, and time. She has normal strength. No cranial nerve deficit or sensory deficit.   Skin: Skin is warm and dry. Capillary refill takes less than 2 seconds. No rash noted.   Psychiatric: She has a normal mood and affect.         ED Course   Procedures  Labs Reviewed   CBC W/ AUTO DIFFERENTIAL - Abnormal; Notable for the following components:       Result Value    WBC 15.02 (*)     Gran # (ANC) 12.4 (*)     Mono # 1.1 (*)     Gran % 82.5 (*)     Lymph % 9.5 (*)     All other components within normal limits   COMPREHENSIVE METABOLIC PANEL - Abnormal; Notable for the following components:    AST 48 (*)     All other components within normal limits   URINALYSIS, REFLEX TO URINE CULTURE - Abnormal; Notable for the following components:    pH, UA >8.0 (*)     Protein, UA Trace (*)     Ketones, UA Trace (*)     Occult Blood UA 2+ (*)     Urobilinogen, UA 2.0-3.0 (*)     All other components within normal limits    Narrative:     Specimen Source->Urine   URINALYSIS MICROSCOPIC - Abnormal; Notable for the following components:    RBC, UA 12 (*)     Bacteria Few (*)     All other components within normal limits    Narrative:     Specimen Source->Urine   PREGNANCY TEST, URINE RAPID    Narrative:     Specimen Source->Urine   SARS-COV-2 RNA AMPLIFICATION, QUAL          Imaging Results           CT Renal Stone Study ABD Pelvis WO (Final result)  Result time 08/07/22 14:07:45    Final result by Micaela Jones MD (08/07/22 14:07:45)                 Impression:      Edematous, thick-walled and distended gallbladder, concerning for acute cholecystitis.    This report was flagged in Epic as abnormal..      Electronically signed by: Micaela Jones  Date:    08/07/2022  Time:    14:07             Narrative:    EXAMINATION:  CT RENAL STONE STUDY ABD PELVIS WO    CLINICAL HISTORY:  Flank pain, kidney stone suspected;    TECHNIQUE:  Low dose axial images, sagittal and coronal reformations were obtained from the lung bases to the  pubic symphysis.  Contrast was not administered.    COMPARISON:  CT abdomen pelvis 07/18/2022    FINDINGS:  Heart size is normal.  No pericardial effusion.  Left basilar atelectasis.    Please note in the absence of IV contrast evaluation for solid organ mass is limited.    Liver is normal in morphology and with smooth contour.    The gallbladder is distended with wall thickening and edema.  No radiopaque calculi.  No intra or extrahepatic biliary ductal dilatation.    The spleen, adrenal gland, pancreas and kidneys are normal.  No hydronephrosis.    No renal, ureteral or bladder calculi.  The bladder is thin walled.    Abdominal aorta is normal in caliber.  No enlarged retroperitoneal lymph nodes.    No bowel obstruction or inflammatory change.  Normal appendix.  No free fluid or free air.    Normal uterus with IUD in place.  No adnexal mass.  No enlarged pelvic lymph nodes.    The soft tissues are unremarkable.  No acute or aggressive osseous abnormality.                                 Medications   piperacillin-tazobactam 4.5 g in dextrose 5 % 100 mL IVPB (ready to mix system) (4.5 g Intravenous New Bag 8/7/22 1432)   piperacillin-tazobactam 4.5 g in dextrose 5 % 100 mL IVPB (ready to mix system) (has no administration in time range)   acetaminophen tablet 1,000 mg (1,000 mg Oral Given 8/7/22 1431)     Medical Decision Making:   History:   Old Medical Records: I decided to obtain old medical records.  Clinical Tests:   Lab Tests: Ordered and Reviewed  Radiological Study: Reviewed and Ordered          Scribe Attestation:   Scribe #1: I performed the above scribed service and the documentation accurately describes the services I performed. I attest to the accuracy of the note.        ED Course as of 08/07/22 1500   Sun Aug 07, 2022   1412 CT Renal Stone Study ABD Pelvis WO(!) [BD]   1412 Impression:     Edematous, thick-walled and distended gallbladder, concerning for acute cholecystitis.     This report was  flagged in Epic as abnormal..        Electronically signed by: Micaela Jones  Date:                                            08/07/2022  Time:                                           14:07 [BD]   1412 WBC(!): 15.02 [BD]   1418 Discussed case with General surgery on-call, Dr. Sheets, who agree with plan for admission to Hospital Medicine and he will evaluate and recommended we make her NPO at midnight for surgery in the morning. [BD]      ED Course User Index  [BD] Israel Jauregui MD              Attending Attestation:     Physician Attestation for Scribe:    I, Dr. Israel Jauregui, personally performed the services described in this documentation.   All medical record entries made by the scribe were at my direction and in my presence.   I have reviewed the chart and agree that the record is accurate and complete.   Israel Jauregui MD  3:00 PM 08/07/2022     DISCLAIMER: This note was prepared with [employer] voice recognition transcription software. Garbled syntax, mangled pronouns, and other bizarre constructions may be attributed to that software system.    Clinical Impression:   Final diagnoses:  [K81.0] Acute cholecystitis (Primary)          ED Disposition Condition    Observation               Israel Jauregui MD  08/07/22 8938

## 2022-08-07 NOTE — HPI
Mena Ovalle is a 27 y/o female with no significant past medical history who presented to the emergency department with complaints of right upper quadrant abdominal pain accompanied by nausea and vomiting which began earlier this morning.  She had a similar episode about a week ago which resolved.  Denies recent fever, chest pain, diarrhea, dysuria, or any other symptoms.  CT abdomen and pelvis concerning for acute cholecystitis.  She will be placed in observation under the service of hospital Medicine.  General surgery has been consulted and plan surgical procedure tomorrow.

## 2022-08-07 NOTE — SUBJECTIVE & OBJECTIVE
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     Iron deficiency anemia 5/17/2018    Pneumonia        Past Surgical History:   Procedure Laterality Date    WISDOM TOOTH EXTRACTION  11/15/2019       Review of patient's allergies indicates:  No Known Allergies    No current facility-administered medications on file prior to encounter.     Current Outpatient Medications on File Prior to Encounter   Medication Sig    traMADoL (ULTRAM) 50 mg tablet Take 1 tablet (50 mg total) by mouth every 6 (six) hours as needed for Pain.     Family History       Problem Relation (Age of Onset)    Breast cancer Maternal Grandmother    Cervical cancer Maternal Grandmother    Lymphoma Maternal Grandmother    Pulmonary embolism Mother          Tobacco Use    Smoking status: Never Smoker    Smokeless tobacco: Never Used   Substance and Sexual Activity    Alcohol use: Yes    Drug use: No    Sexual activity: Yes     Partners: Male     Review of Systems   Constitutional:  Negative for appetite change, chills, fatigue and fever.   HENT:  Negative for sore throat and trouble swallowing.    Respiratory:  Negative for cough, shortness of breath and wheezing.    Cardiovascular:  Negative for chest pain and palpitations.   Gastrointestinal:  Positive for abdominal pain, nausea and vomiting. Negative for diarrhea.   Genitourinary:  Negative for dysuria and urgency.   Musculoskeletal:  Positive for back pain (chronic). Negative for arthralgias.   Skin:  Negative for pallor and rash.   Neurological:  Negative for weakness and light-headedness.   Psychiatric/Behavioral:  Negative for agitation and confusion.    All other systems reviewed and are negative.  Objective:     Vital Signs (Most Recent):  Temp: 98.2 °F (36.8 °C) (08/07/22 1159)  Pulse: 78 (08/07/22 1242)  Resp: 18 (08/07/22  1242)  BP: 119/75 (08/07/22 1242)  SpO2: 100 % (08/07/22 1242) Vital Signs (24h Range):  Temp:  [98.2 °F (36.8 °C)] 98.2 °F (36.8 °C)  Pulse:  [78-85] 78  Resp:  [18] 18  SpO2:  [83 %-100 %] 100 %  BP: (115-119)/(75) 119/75     Weight: 90.7 kg (200 lb)  Body mass index is 31.32 kg/m².    Physical Exam  Constitutional:       General: She is not in acute distress.     Appearance: She is well-developed. She is not ill-appearing.   HENT:      Head: Normocephalic and atraumatic.   Eyes:      Conjunctiva/sclera: Conjunctivae normal.      Pupils: Pupils are equal, round, and reactive to light.   Cardiovascular:      Rate and Rhythm: Normal rate and regular rhythm.      Heart sounds: Normal heart sounds.   Pulmonary:      Effort: Pulmonary effort is normal. No respiratory distress.      Breath sounds: Normal breath sounds.   Abdominal:      General: Bowel sounds are normal. There is no distension.      Palpations: Abdomen is soft.      Tenderness: There is abdominal tenderness (RUQ).   Musculoskeletal:         General: Normal range of motion.      Cervical back: Normal range of motion and neck supple.   Skin:     General: Skin is warm and dry.      Capillary Refill: Capillary refill takes 2 to 3 seconds.   Neurological:      General: No focal deficit present.      Mental Status: She is alert and oriented to person, place, and time.   Psychiatric:         Mood and Affect: Mood normal.         Behavior: Behavior normal.         Thought Content: Thought content normal.         Judgment: Judgment normal.         CRANIAL NERVES     CN III, IV, VI   Pupils are equal, round, and reactive to light.     Significant Labs: All pertinent labs within the past 24 hours have been reviewed.  CBC:   Recent Labs   Lab 08/07/22  1213   WBC 15.02*   HGB 12.6   HCT 38.4        CMP:   Recent Labs   Lab 08/07/22  1213      K 3.9      CO2 24   GLU 96   BUN 10   CREATININE 0.8   CALCIUM 9.4   PROT 8.0   ALBUMIN 4.1   BILITOT 0.7    ALKPHOS 94   AST 48*   ALT 36   ANIONGAP 10     Urine Studies:   Recent Labs   Lab 08/07/22  1241   COLORU Yellow   APPEARANCEUA Clear   PHUR >8.0*   SPECGRAV 1.015   PROTEINUA Trace*   GLUCUA Negative   KETONESU Trace*   BILIRUBINUA Negative   OCCULTUA 2+*   NITRITE Negative   UROBILINOGEN 2.0-3.0*   LEUKOCYTESUR Negative   RBCUA 12*   WBCUA 1   BACTERIA Few*   SQUAMEPITHEL 4       Significant Imaging: I have reviewed all pertinent imaging results/findings within the past 24 hours.  CT abd/pelvis renal stone protocol:  Edematous, thick-walled and distended gallbladder, concerning for acute cholecystitis.

## 2022-08-08 ENCOUNTER — ANESTHESIA EVENT (OUTPATIENT)
Dept: SURGERY | Facility: HOSPITAL | Age: 27
DRG: 418 | End: 2022-08-08
Payer: MEDICAID

## 2022-08-08 ENCOUNTER — ANESTHESIA (OUTPATIENT)
Dept: SURGERY | Facility: HOSPITAL | Age: 27
DRG: 418 | End: 2022-08-08
Payer: MEDICAID

## 2022-08-08 PROBLEM — R74.01 TRANSAMINITIS: Status: ACTIVE | Noted: 2022-08-08

## 2022-08-08 LAB
ALBUMIN SERPL BCP-MCNC: 3.4 G/DL (ref 3.5–5.2)
ALP SERPL-CCNC: 137 U/L (ref 55–135)
ALT SERPL W/O P-5'-P-CCNC: 281 U/L (ref 10–44)
ANION GAP SERPL CALC-SCNC: 10 MMOL/L (ref 8–16)
AST SERPL-CCNC: 309 U/L (ref 10–40)
BASOPHILS # BLD AUTO: 0.05 K/UL (ref 0–0.2)
BASOPHILS NFR BLD: 0.8 % (ref 0–1.9)
BILIRUB SERPL-MCNC: 1.7 MG/DL (ref 0.1–1)
BUN SERPL-MCNC: 12 MG/DL (ref 6–20)
CALCIUM SERPL-MCNC: 8.7 MG/DL (ref 8.7–10.5)
CHLORIDE SERPL-SCNC: 106 MMOL/L (ref 95–110)
CO2 SERPL-SCNC: 23 MMOL/L (ref 23–29)
CREAT SERPL-MCNC: 0.9 MG/DL (ref 0.5–1.4)
DIFFERENTIAL METHOD: ABNORMAL
EOSINOPHIL # BLD AUTO: 0.1 K/UL (ref 0–0.5)
EOSINOPHIL NFR BLD: 1.4 % (ref 0–8)
ERYTHROCYTE [DISTWIDTH] IN BLOOD BY AUTOMATED COUNT: 13.2 % (ref 11.5–14.5)
EST. GFR  (NO RACE VARIABLE): >60 ML/MIN/1.73 M^2
GLUCOSE SERPL-MCNC: 100 MG/DL (ref 70–110)
HCT VFR BLD AUTO: 35.3 % (ref 37–48.5)
HGB BLD-MCNC: 11.3 G/DL (ref 12–16)
IMM GRANULOCYTES # BLD AUTO: 0.01 K/UL (ref 0–0.04)
IMM GRANULOCYTES NFR BLD AUTO: 0.2 % (ref 0–0.5)
INR PPP: 1 (ref 0.8–1.2)
LYMPHOCYTES # BLD AUTO: 1.3 K/UL (ref 1–4.8)
LYMPHOCYTES NFR BLD: 21.6 % (ref 18–48)
MAGNESIUM SERPL-MCNC: 2 MG/DL (ref 1.6–2.6)
MCH RBC QN AUTO: 27.2 PG (ref 27–31)
MCHC RBC AUTO-ENTMCNC: 32 G/DL (ref 32–36)
MCV RBC AUTO: 85 FL (ref 82–98)
MONOCYTES # BLD AUTO: 0.6 K/UL (ref 0.3–1)
MONOCYTES NFR BLD: 9.5 % (ref 4–15)
NEUTROPHILS # BLD AUTO: 3.9 K/UL (ref 1.8–7.7)
NEUTROPHILS NFR BLD: 66.5 % (ref 38–73)
NRBC BLD-RTO: 0 /100 WBC
PLATELET # BLD AUTO: 334 K/UL (ref 150–450)
PMV BLD AUTO: 11 FL (ref 9.2–12.9)
POTASSIUM SERPL-SCNC: 3.8 MMOL/L (ref 3.5–5.1)
PROT SERPL-MCNC: 6.9 G/DL (ref 6–8.4)
PROTHROMBIN TIME: 10.9 SEC (ref 9–12.5)
RBC # BLD AUTO: 4.15 M/UL (ref 4–5.4)
SODIUM SERPL-SCNC: 139 MMOL/L (ref 136–145)
WBC # BLD AUTO: 5.92 K/UL (ref 3.9–12.7)

## 2022-08-08 PROCEDURE — 36415 COLL VENOUS BLD VENIPUNCTURE: CPT | Performed by: NURSE PRACTITIONER

## 2022-08-08 PROCEDURE — 12000002 HC ACUTE/MED SURGE SEMI-PRIVATE ROOM

## 2022-08-08 PROCEDURE — 63600175 PHARM REV CODE 636 W HCPCS: Performed by: SURGERY

## 2022-08-08 PROCEDURE — 88304 TISSUE EXAM BY PATHOLOGIST: CPT | Mod: 26,,, | Performed by: STUDENT IN AN ORGANIZED HEALTH CARE EDUCATION/TRAINING PROGRAM

## 2022-08-08 PROCEDURE — 94799 UNLISTED PULMONARY SVC/PX: CPT

## 2022-08-08 PROCEDURE — D9220A PRA ANESTHESIA: ICD-10-PCS | Mod: CRNA,,, | Performed by: NURSE ANESTHETIST, CERTIFIED REGISTERED

## 2022-08-08 PROCEDURE — 94761 N-INVAS EAR/PLS OXIMETRY MLT: CPT

## 2022-08-08 PROCEDURE — 27201423 OPTIME MED/SURG SUP & DEVICES STERILE SUPPLY: Performed by: SURGERY

## 2022-08-08 PROCEDURE — D9220A PRA ANESTHESIA: Mod: CRNA,,, | Performed by: NURSE ANESTHETIST, CERTIFIED REGISTERED

## 2022-08-08 PROCEDURE — 88304 TISSUE EXAM BY PATHOLOGIST: CPT | Performed by: STUDENT IN AN ORGANIZED HEALTH CARE EDUCATION/TRAINING PROGRAM

## 2022-08-08 PROCEDURE — 99900104 DSU ONLY-NO CHARGE-EA ADD'L HR (STAT): Performed by: SURGERY

## 2022-08-08 PROCEDURE — 63600175 PHARM REV CODE 636 W HCPCS: Performed by: NURSE PRACTITIONER

## 2022-08-08 PROCEDURE — 37000009 HC ANESTHESIA EA ADD 15 MINS: Performed by: SURGERY

## 2022-08-08 PROCEDURE — 25000003 PHARM REV CODE 250: Performed by: SURGERY

## 2022-08-08 PROCEDURE — D9220A PRA ANESTHESIA: Mod: ANES,,, | Performed by: ANESTHESIOLOGY

## 2022-08-08 PROCEDURE — 36000709 HC OR TIME LEV III EA ADD 15 MIN: Performed by: SURGERY

## 2022-08-08 PROCEDURE — 25000003 PHARM REV CODE 250: Performed by: NURSE PRACTITIONER

## 2022-08-08 PROCEDURE — 36000708 HC OR TIME LEV III 1ST 15 MIN: Performed by: SURGERY

## 2022-08-08 PROCEDURE — 63600175 PHARM REV CODE 636 W HCPCS: Performed by: ANESTHESIOLOGY

## 2022-08-08 PROCEDURE — 80053 COMPREHEN METABOLIC PANEL: CPT | Performed by: NURSE PRACTITIONER

## 2022-08-08 PROCEDURE — 88304 PR  SURG PATH,LEVEL III: ICD-10-PCS | Mod: 26,,, | Performed by: STUDENT IN AN ORGANIZED HEALTH CARE EDUCATION/TRAINING PROGRAM

## 2022-08-08 PROCEDURE — 63600175 PHARM REV CODE 636 W HCPCS: Performed by: NURSE ANESTHETIST, CERTIFIED REGISTERED

## 2022-08-08 PROCEDURE — 25000003 PHARM REV CODE 250: Performed by: NURSE ANESTHETIST, CERTIFIED REGISTERED

## 2022-08-08 PROCEDURE — D9220A PRA ANESTHESIA: ICD-10-PCS | Mod: ANES,,, | Performed by: ANESTHESIOLOGY

## 2022-08-08 PROCEDURE — 99900035 HC TECH TIME PER 15 MIN (STAT)

## 2022-08-08 PROCEDURE — 37000008 HC ANESTHESIA 1ST 15 MINUTES: Performed by: SURGERY

## 2022-08-08 PROCEDURE — 99900103 DSU ONLY-NO CHARGE-INITIAL HR (STAT): Performed by: SURGERY

## 2022-08-08 PROCEDURE — 47562 LAPAROSCOPIC CHOLECYSTECTOMY: CPT | Mod: ,,, | Performed by: SURGERY

## 2022-08-08 PROCEDURE — 85025 COMPLETE CBC W/AUTO DIFF WBC: CPT | Performed by: NURSE PRACTITIONER

## 2022-08-08 PROCEDURE — 85610 PROTHROMBIN TIME: CPT | Performed by: NURSE PRACTITIONER

## 2022-08-08 PROCEDURE — 83735 ASSAY OF MAGNESIUM: CPT | Performed by: NURSE PRACTITIONER

## 2022-08-08 PROCEDURE — 47562 PR LAP,CHOLECYSTECTOMY: ICD-10-PCS | Mod: ,,, | Performed by: SURGERY

## 2022-08-08 PROCEDURE — G0378 HOSPITAL OBSERVATION PER HR: HCPCS

## 2022-08-08 PROCEDURE — 71000039 HC RECOVERY, EACH ADD'L HOUR: Performed by: SURGERY

## 2022-08-08 PROCEDURE — 71000033 HC RECOVERY, INTIAL HOUR: Performed by: SURGERY

## 2022-08-08 RX ORDER — HYDROCODONE BITARTRATE AND ACETAMINOPHEN 5; 325 MG/1; MG/1
1 TABLET ORAL EVERY 4 HOURS PRN
Status: DISCONTINUED | OUTPATIENT
Start: 2022-08-08 | End: 2022-08-12 | Stop reason: HOSPADM

## 2022-08-08 RX ORDER — SUCCINYLCHOLINE CHLORIDE 20 MG/ML
INJECTION INTRAMUSCULAR; INTRAVENOUS
Status: DISCONTINUED | OUTPATIENT
Start: 2022-08-08 | End: 2022-08-08

## 2022-08-08 RX ORDER — DEXAMETHASONE SODIUM PHOSPHATE 4 MG/ML
INJECTION, SOLUTION INTRA-ARTICULAR; INTRALESIONAL; INTRAMUSCULAR; INTRAVENOUS; SOFT TISSUE
Status: DISCONTINUED | OUTPATIENT
Start: 2022-08-08 | End: 2022-08-08

## 2022-08-08 RX ORDER — MUPIROCIN 20 MG/G
1 OINTMENT TOPICAL 2 TIMES DAILY
Status: DISCONTINUED | OUTPATIENT
Start: 2022-08-08 | End: 2022-08-12 | Stop reason: HOSPADM

## 2022-08-08 RX ORDER — FENTANYL CITRATE 50 UG/ML
25 INJECTION, SOLUTION INTRAMUSCULAR; INTRAVENOUS EVERY 5 MIN PRN
Status: DISCONTINUED | OUTPATIENT
Start: 2022-08-08 | End: 2022-08-08 | Stop reason: HOSPADM

## 2022-08-08 RX ORDER — ONDANSETRON HYDROCHLORIDE 2 MG/ML
INJECTION, SOLUTION INTRAMUSCULAR; INTRAVENOUS
Status: DISCONTINUED | OUTPATIENT
Start: 2022-08-08 | End: 2022-08-08

## 2022-08-08 RX ORDER — ROCURONIUM BROMIDE 10 MG/ML
INJECTION, SOLUTION INTRAVENOUS
Status: DISCONTINUED | OUTPATIENT
Start: 2022-08-08 | End: 2022-08-08

## 2022-08-08 RX ORDER — MIDAZOLAM HYDROCHLORIDE 1 MG/ML
INJECTION INTRAMUSCULAR; INTRAVENOUS
Status: DISCONTINUED | OUTPATIENT
Start: 2022-08-08 | End: 2022-08-08

## 2022-08-08 RX ORDER — KETAMINE HYDROCHLORIDE 100 MG/ML
INJECTION, SOLUTION INTRAMUSCULAR; INTRAVENOUS
Status: DISCONTINUED | OUTPATIENT
Start: 2022-08-08 | End: 2022-08-08

## 2022-08-08 RX ORDER — PHENYLEPHRINE HYDROCHLORIDE 10 MG/ML
INJECTION INTRAVENOUS
Status: DISCONTINUED | OUTPATIENT
Start: 2022-08-08 | End: 2022-08-08

## 2022-08-08 RX ORDER — OXYCODONE HYDROCHLORIDE 5 MG/1
5 TABLET ORAL
Status: DISCONTINUED | OUTPATIENT
Start: 2022-08-08 | End: 2022-08-08 | Stop reason: HOSPADM

## 2022-08-08 RX ORDER — LIDOCAINE HCL/PF 100 MG/5ML
SYRINGE (ML) INTRAVENOUS
Status: DISCONTINUED | OUTPATIENT
Start: 2022-08-08 | End: 2022-08-08

## 2022-08-08 RX ORDER — HYDROMORPHONE HYDROCHLORIDE 2 MG/ML
0.2 INJECTION, SOLUTION INTRAMUSCULAR; INTRAVENOUS; SUBCUTANEOUS EVERY 5 MIN PRN
Status: DISCONTINUED | OUTPATIENT
Start: 2022-08-08 | End: 2022-08-08 | Stop reason: HOSPADM

## 2022-08-08 RX ORDER — BUPIVACAINE HYDROCHLORIDE 5 MG/ML
INJECTION, SOLUTION EPIDURAL; INTRACAUDAL
Status: DISCONTINUED | OUTPATIENT
Start: 2022-08-08 | End: 2022-08-08 | Stop reason: HOSPADM

## 2022-08-08 RX ORDER — FENTANYL CITRATE 50 UG/ML
INJECTION, SOLUTION INTRAMUSCULAR; INTRAVENOUS
Status: DISCONTINUED | OUTPATIENT
Start: 2022-08-08 | End: 2022-08-08

## 2022-08-08 RX ORDER — METOCLOPRAMIDE HYDROCHLORIDE 5 MG/ML
10 INJECTION INTRAMUSCULAR; INTRAVENOUS EVERY 10 MIN PRN
Status: DISCONTINUED | OUTPATIENT
Start: 2022-08-08 | End: 2022-08-08 | Stop reason: HOSPADM

## 2022-08-08 RX ORDER — ONDANSETRON 4 MG/1
8 TABLET, ORALLY DISINTEGRATING ORAL EVERY 6 HOURS PRN
Status: DISCONTINUED | OUTPATIENT
Start: 2022-08-08 | End: 2022-08-12 | Stop reason: HOSPADM

## 2022-08-08 RX ORDER — PROPOFOL 10 MG/ML
VIAL (ML) INTRAVENOUS
Status: DISCONTINUED | OUTPATIENT
Start: 2022-08-08 | End: 2022-08-08

## 2022-08-08 RX ADMIN — HYDROMORPHONE HYDROCHLORIDE 0.2 MG: 2 INJECTION INTRAMUSCULAR; INTRAVENOUS; SUBCUTANEOUS at 12:08

## 2022-08-08 RX ADMIN — SODIUM CHLORIDE, SODIUM GLUCONATE, SODIUM ACETATE, POTASSIUM CHLORIDE, MAGNESIUM CHLORIDE, SODIUM PHOSPHATE, DIBASIC, AND POTASSIUM PHOSPHATE: .53; .5; .37; .037; .03; .012; .00082 INJECTION, SOLUTION INTRAVENOUS at 11:08

## 2022-08-08 RX ADMIN — SODIUM CHLORIDE: 0.9 INJECTION, SOLUTION INTRAVENOUS at 02:08

## 2022-08-08 RX ADMIN — ROCURONIUM BROMIDE 20 MG: 10 INJECTION, SOLUTION INTRAVENOUS at 10:08

## 2022-08-08 RX ADMIN — KETOROLAC TROMETHAMINE 15 MG: 30 INJECTION, SOLUTION INTRAMUSCULAR; INTRAVENOUS at 08:08

## 2022-08-08 RX ADMIN — ROCURONIUM BROMIDE 10 MG: 10 INJECTION, SOLUTION INTRAVENOUS at 10:08

## 2022-08-08 RX ADMIN — KETOROLAC TROMETHAMINE 15 MG: 30 INJECTION, SOLUTION INTRAMUSCULAR; INTRAVENOUS at 02:08

## 2022-08-08 RX ADMIN — SODIUM CHLORIDE: 0.9 INJECTION, SOLUTION INTRAVENOUS at 01:08

## 2022-08-08 RX ADMIN — PHENYLEPHRINE HYDROCHLORIDE 100 MCG: 10 INJECTION INTRAVENOUS at 11:08

## 2022-08-08 RX ADMIN — PIPERACILLIN SODIUM AND TAZOBACTAM SODIUM 4.5 G: 4; .5 INJECTION, POWDER, LYOPHILIZED, FOR SOLUTION INTRAVENOUS at 09:08

## 2022-08-08 RX ADMIN — MIDAZOLAM HYDROCHLORIDE 2 MG: 1 INJECTION, SOLUTION INTRAMUSCULAR; INTRAVENOUS at 10:08

## 2022-08-08 RX ADMIN — PIPERACILLIN SODIUM AND TAZOBACTAM SODIUM 4.5 G: 4; .5 INJECTION, POWDER, LYOPHILIZED, FOR SOLUTION INTRAVENOUS at 01:08

## 2022-08-08 RX ADMIN — SENNOSIDES AND DOCUSATE SODIUM 1 TABLET: 50; 8.6 TABLET ORAL at 09:08

## 2022-08-08 RX ADMIN — PROPOFOL 180 MG: 10 INJECTION, EMULSION INTRAVENOUS at 10:08

## 2022-08-08 RX ADMIN — KETAMINE HYDROCHLORIDE 50 MG: 100 INJECTION, SOLUTION, CONCENTRATE INTRAMUSCULAR; INTRAVENOUS at 10:08

## 2022-08-08 RX ADMIN — ONDANSETRON 4 MG: 2 INJECTION, SOLUTION INTRAMUSCULAR; INTRAVENOUS at 10:08

## 2022-08-08 RX ADMIN — DEXAMETHASONE SODIUM PHOSPHATE 4 MG: 4 INJECTION, SOLUTION INTRA-ARTICULAR; INTRALESIONAL; INTRAMUSCULAR; INTRAVENOUS; SOFT TISSUE at 10:08

## 2022-08-08 RX ADMIN — LIDOCAINE HYDROCHLORIDE 100 MG: 20 INJECTION INTRAVENOUS at 10:08

## 2022-08-08 RX ADMIN — PIPERACILLIN SODIUM AND TAZOBACTAM SODIUM 4.5 G: 4; .5 INJECTION, POWDER, LYOPHILIZED, FOR SOLUTION INTRAVENOUS at 05:08

## 2022-08-08 RX ADMIN — FENTANYL CITRATE 100 MCG: 50 INJECTION, SOLUTION INTRAMUSCULAR; INTRAVENOUS at 10:08

## 2022-08-08 RX ADMIN — HYDROCODONE BITARTRATE AND ACETAMINOPHEN 1 TABLET: 5; 325 TABLET ORAL at 04:08

## 2022-08-08 RX ADMIN — HYDROCODONE BITARTRATE AND ACETAMINOPHEN 1 TABLET: 5; 325 TABLET ORAL at 08:08

## 2022-08-08 RX ADMIN — MUPIROCIN 1 G: 20 OINTMENT TOPICAL at 09:08

## 2022-08-08 RX ADMIN — SUCCINYLCHOLINE CHLORIDE 120 MG: 20 INJECTION, SOLUTION INTRAMUSCULAR; INTRAVENOUS; PARENTERAL at 10:08

## 2022-08-08 RX ADMIN — SUGAMMADEX 200 MG: 100 INJECTION, SOLUTION INTRAVENOUS at 11:08

## 2022-08-08 RX ADMIN — SODIUM CHLORIDE, SODIUM GLUCONATE, SODIUM ACETATE, POTASSIUM CHLORIDE, MAGNESIUM CHLORIDE, SODIUM PHOSPHATE, DIBASIC, AND POTASSIUM PHOSPHATE: .53; .5; .37; .037; .03; .012; .00082 INJECTION, SOLUTION INTRAVENOUS at 09:08

## 2022-08-08 RX ADMIN — KETOROLAC TROMETHAMINE 30 MG: 30 INJECTION, SOLUTION INTRAMUSCULAR; INTRAVENOUS at 11:08

## 2022-08-08 NOTE — ANESTHESIA PREPROCEDURE EVALUATION
08/08/2022  Mena Ovalle is a 26 y.o., female.      Pre-op Assessment    I have reviewed the Patient Summary Reports.     I have reviewed the Nursing Notes. I have reviewed the NPO Status.   I have reviewed the Medications.     Review of Systems  Anesthesia Hx:  No previous Anesthesia  Denies Family Hx of Anesthesia complications.    Hematology/Oncology:         -- Anemia:   Hepatic/GI:   Acute cholecystitis   Endocrine:  Obesity / BMI > 30      Physical Exam  General: Cooperative, Alert and Oriented    Airway:  Mallampati: III   Mouth Opening: Small, but > 3cm  TM Distance: Normal  Tongue: Normal  Neck ROM: Normal ROM  Neck: Girth Increased    Dental:  Intact    Chest/Lungs:  Normal Respiratory Rate    Heart:  Rate: Normal  Rhythm: Regular Rhythm        Anesthesia Plan  Type of Anesthesia, risks & benefits discussed:    Anesthesia Type: Gen ETT  Intra-op Monitoring Plan: Standard ASA Monitors  Post Op Pain Control Plan: multimodal analgesia  Induction:  IV  Airway Plan: Video and Direct, Post-Induction  Informed Consent: Informed consent signed with the Patient and all parties understand the risks and agree with anesthesia plan.  All questions answered.   ASA Score: 2    Ready For Surgery From Anesthesia Perspective.     .

## 2022-08-08 NOTE — PLAN OF CARE
Pt states pain tolerable 3-4  instr on abd splinting and really needs to practice IS  Only able to get to 500 ml with a lot of encouragement  dermabond sites x 4 intact no n+v noted

## 2022-08-08 NOTE — ANESTHESIA PROCEDURE NOTES
Intubation    Date/Time: 8/8/2022 10:39 AM  Performed by: Sam Hernandez Jr., CRNA  Authorized by: Howie Durán MD     Intubation:     Induction:  Intravenous    Intubated:  Postinduction    Mask Ventilation:  Easy mask    Attempts:  1    Attempted By:  CRNA    Method of Intubation:  Direct    Blade:  Ambrosio 2    Laryngeal View Grade: Grade IIA - cords partially seen      Difficult Airway Encountered?: No      Complications:  None    Airway Device:  Oral endotracheal tube    Airway Device Size:  7.5    Style/Cuff Inflation:  Cuffed (inflated to minimal occlusive pressure)    Inflation Amount (mL):  3    Tube secured:  22    Secured at:  The lips    Placement Verified By:  Capnometry    Complicating Factors:  Small mouth    Findings Post-Intubation:  BS equal bilateral and atraumatic/condition of teeth unchanged

## 2022-08-08 NOTE — SUBJECTIVE & OBJECTIVE
Interval History: no acute events overnight.  Plan lap cindy this morning.    Review of Systems   Constitutional:  Negative for appetite change, chills, fatigue and fever.   HENT:  Negative for sore throat and trouble swallowing.    Respiratory:  Negative for cough, shortness of breath and wheezing.    Cardiovascular:  Negative for chest pain and palpitations.   Gastrointestinal:  Positive for abdominal pain. Negative for diarrhea, nausea and vomiting.   Genitourinary:  Negative for dysuria and urgency.   Musculoskeletal:  Positive for back pain (chronic). Negative for arthralgias.   Skin:  Negative for pallor and rash.   Neurological:  Negative for weakness and light-headedness.   Psychiatric/Behavioral:  Negative for agitation and confusion.    All other systems reviewed and are negative.  Objective:     Vital Signs (Most Recent):  Temp: 98.8 °F (37.1 °C) (08/08/22 0916)  Pulse: 79 (08/08/22 0916)  Resp: 16 (08/08/22 0916)  BP: 114/71 (08/08/22 0916)  SpO2: 99 % (08/08/22 0916) Vital Signs (24h Range):  Temp:  [97.1 °F (36.2 °C)-98.8 °F (37.1 °C)] 98.8 °F (37.1 °C)  Pulse:  [66-81] 79  Resp:  [16-20] 16  SpO2:  [97 %-100 %] 99 %  BP: (108-127)/(64-85) 114/71     Weight: 88.5 kg (195 lb)  Body mass index is 30.54 kg/m².    Intake/Output Summary (Last 24 hours) at 8/8/2022 1212  Last data filed at 8/8/2022 1124  Gross per 24 hour   Intake 2107.04 ml   Output 5 ml   Net 2102.04 ml      Physical Exam  Constitutional:       General: She is not in acute distress.     Appearance: She is well-developed. She is not ill-appearing.   HENT:      Head: Normocephalic and atraumatic.   Eyes:      Conjunctiva/sclera: Conjunctivae normal.      Pupils: Pupils are equal, round, and reactive to light.   Cardiovascular:      Rate and Rhythm: Normal rate and regular rhythm.      Heart sounds: Normal heart sounds.   Pulmonary:      Effort: Pulmonary effort is normal. No respiratory distress.      Breath sounds: Normal breath sounds.    Abdominal:      General: Bowel sounds are normal. There is no distension.      Palpations: Abdomen is soft.      Tenderness: There is abdominal tenderness (RUQ).   Musculoskeletal:         General: Normal range of motion.      Cervical back: Normal range of motion and neck supple.   Skin:     General: Skin is warm and dry.      Capillary Refill: Capillary refill takes 2 to 3 seconds.   Neurological:      General: No focal deficit present.      Mental Status: She is alert and oriented to person, place, and time.   Psychiatric:         Mood and Affect: Mood normal.         Behavior: Behavior normal.         Thought Content: Thought content normal.         Judgment: Judgment normal.       Significant Labs: All pertinent labs within the past 24 hours have been reviewed.  CBC:   Recent Labs   Lab 08/07/22  1213 08/08/22  0413   WBC 15.02* 5.92   HGB 12.6 11.3*   HCT 38.4 35.3*    334     CMP:   Recent Labs   Lab 08/07/22  1213 08/08/22  0413    139   K 3.9 3.8    106   CO2 24 23   GLU 96 100   BUN 10 12   CREATININE 0.8 0.9   CALCIUM 9.4 8.7   PROT 8.0 6.9   ALBUMIN 4.1 3.4*   BILITOT 0.7 1.7*   ALKPHOS 94 137*   AST 48* 309*   ALT 36 281*   ANIONGAP 10 10       Significant Imaging: I have reviewed all pertinent imaging results/findings within the past 24 hours.

## 2022-08-08 NOTE — PLAN OF CARE
Plan of care reviewed with patient. Verbalized understanding. Pain managed with prn medication. IV intact and patent. Patient aware of possible surgery in am and NPO status after midnight. CHG bath wipes given to patient and instructed on use. Patient ambulatory. SCD's in place. Fluids infusing. Safety maintained. Call light in reach and instructed to call for assistance.

## 2022-08-08 NOTE — PLAN OF CARE
Patient prepared for surgery. Surgical and anesthesia consents signed. Performed incentive spirometer teaching. Text message alerts set up with  Thien.

## 2022-08-08 NOTE — CARE UPDATE
08/08/22 1515   Patient Assessment/Suction   Level of Consciousness (AVPU) alert   PRE-TX-O2   O2 Device (Oxygen Therapy) room air   SpO2 100 %   Pulse Oximetry Type Intermittent   $ Pulse Oximetry - Multiple Charge Pulse Oximetry - Multiple   Incentive Spirometer   $ Incentive Spirometer Charges done with encouragement   Administration (IS) instruction provided, follow-up;mouthpiece utilized   Number of Repetitions (IS) 10   Level Incentive Spirometer (mL) 1000   Patient Tolerance (IS) good

## 2022-08-08 NOTE — BRIEF OP NOTE
[facility]  Brief Operative Note    SUMMARY     Surgery Date: 8/8/2022     Surgeon(s) and Role:     * Virgil Mendoza III, MD - Primary    Assist Torres CHASE III    Pre-op Diagnosis:  Acute cholecystitis [K81.0]    Post-op Diagnosis:  Post-Op Diagnosis Codes:     * Acute cholecystitis [K81.0]    Procedure(s) (LRB):  CHOLECYSTECTOMY, LAPAROSCOPIC (N/A)    Anesthesia: General    Operative Findings: The patient was brought to the operating room and transferred to the operating room table in the supine position.  Patient was given general anesthesia and intubated.  The abdomen was prepped and draped.  A transverse infraumbilical incision was made.  Dissection was carried down through the skin and subcutaneous tissue.  The abdomen was insufflated with the Veress needle. The abdomen was entered with the 5 mm visiport. Under direct visualization, three ports were placed.  One 12 mm was placed in the subxiphoid position, one 5 mm in the right anterior axillary line and the other 5 mm in the mid clavicular line.  The gallbladder body was retracted superiorly and the infundibulum was retracted laterally.  The peritoneum overlying the cystic duct and artery was carefully taken down. Gallbladder was inflamed. The cystic duct and cystic artery were individually isolated and dissected free 360 degrees.The cystic duct tore with retraction. I was able to grab the stump and place a clip. No bile leak appreciated. The cystic artery was clipped proximally and distally and transected using endo umair.  Electrocautery was used to take the gallbladder off the liver bed.  The Endo-Catch bag was used to remove the gallbladder from the abdomen.  We irrigated out the right upper quadrant with irrigation.  We checked again and there was no evidence of any bile leak or bleeding from our clips or from the liver bed.  The patient tolerated the procedure well.  We removed all of our ports. We repaired the skin with 4-0  Monocryl.  After that was done, the patient was extubated and taken to the recovery room in stable condition.  All lap and instrument counts were correct.    Estimated Blood Loss: 5 mL    Estimated Blood Loss has been documented.         Specimens:   Specimen (24h ago, onward)             Start     Ordered    08/08/22 1112  Specimen to Pathology, Surgery General Surgery  Once        Comments: Pre-op Diagnosis: Acute cholecystitis [K81.0]Procedure(s):CHOLECYSTECTOMY, LAPAROSCOPIC Number of specimens: 1Name of specimens: Gallbladder     References:    Click here for ordering Quick Tip   Question Answer Comment   Procedure Type: General Surgery    Specimen Class: Routine/Screening    Which provider would you like to cc? NEISHA CUELLO III    Release to patient Immediate        08/08/22 1112    Pending  Specimen to Pathology, Surgery  Once        Comments: Pre-op Diagnosis: Acute cholecystitis [K81.0]Procedure(s):CHOLECYSTECTOMY, LAPAROSCOPIC Number of specimens: 1Name of specimens: Gallbladder     References:    Click here for ordering Quick Tip   Question Answer Comment   Specimen Class: Routine/Screening    Which provider would you like to cc? NEISHA CUELLO III    Release to patient Immediate        Pending                [id number]

## 2022-08-08 NOTE — PLAN OF CARE
[facility]  Initial Discharge Assessment       Primary Care Provider: Primary Doctor No    Admission Diagnosis: Acute cholecystitis [K81.0]    Admission Date: 8/7/2022  Expected Discharge Date: 8/8/2022         Payor: MEDICAID / Plan: [place] [facility] CONNECT / Product Type: Managed Medicaid /     Extended Emergency Contact Information  Primary Emergency Contact: Thien Ovalle  Address: [address] [place] [address] [place]  Mobile Phone: [phone]  Relation: Spouse  Secondary Emergency Contact: Annette Nichols  Address: [address] [place] [address] [facility]  Home Phone: [phone]  Work Phone: [phone]  Mobile Phone: [phone]  Relation: Mother   needed? No    Discharge Plan A: Home with family  Discharge Plan B: Home      [id number] - [place], [place] - [address] [facility] &   [address] [address]  Phone: [phone] Fax: [phone]      DC assessment completed at bedside. Verified information on facesheet as correct. Denies POA. Lives at listed address with spouse, children, and mother in law. Reports Dr. Camargo as PCP. Verified pharmacy as [facility]. Denies hh/hd/dme. Independent at baseline. Reports that she was supposed to start outpt therapy but delayed due to pain related to this admission. Denies recent inpt stay in last 30 days. DC plan is home.   Initial Assessment (most recent)     Adult Discharge Assessment - 08/08/22 1611        Discharge Assessment    Assessment Type Discharge Planning Assessment     Confirmed/corrected address, phone number and insurance Yes     Confirmed Demographics Correct on Facesheet     Source of Information patient     Communicated KRISTY with patient/caregiver Yes     Lives With spouse     Facility Arrived From: home     Do you expect to return to your current living situation? No     Do you have help at  home or someone to help you manage your care at home? Yes     Prior to hospitilization cognitive status: Alert/Oriented     Current cognitive status: Alert/Oriented     Walking or Climbing Stairs Difficulty none     Dressing/Bathing Difficulty none     Equipment Currently Used at Home none     Readmission within 30 days? No     Patient currently being followed by outpatient case management? No     Do you currently have service(s) that help you manage your care at home? No     Do you take prescription medications? Yes     Do you have prescription coverage? Yes     Coverage medicaid [facility]     Do you have any problems affording any of your prescribed medications? No     Is the patient taking medications as prescribed? yes     Who is going to help you get home at discharge? spouse     How do you get to doctors appointments? family or friend will provide;car, drives self     Are you on dialysis? No     Do you take coumadin? No     Discharge Plan A Home with family     Discharge Plan B Home     Discharge Plan discussed with: Patient

## 2022-08-08 NOTE — OP NOTE
Surgery Date: 8/8/2022     Surgeon(s) and Role:     * Virgil Mendoza III, MD - Primary    Assist Torres CHASE III    Pre-op Diagnosis:  Acute cholecystitis [K81.0]    Post-op Diagnosis:  Post-Op Diagnosis Codes:     * Acute cholecystitis [K81.0]    Procedure(s) (LRB):  CHOLECYSTECTOMY, LAPAROSCOPIC (N/A)    Anesthesia: General    Operative Findings: The patient was brought to the operating room and transferred to the operating room table in the supine position.  Patient was given general anesthesia and intubated.  The abdomen was prepped and draped.  A transverse infraumbilical incision was made.  Dissection was carried down through the skin and subcutaneous tissue.  The abdomen was insufflated with the Veress needle. The abdomen was entered with the 5 mm visiport. Under direct visualization, three ports were placed.  One 12 mm was placed in the subxiphoid position, one 5 mm in the right anterior axillary line and the other 5 mm in the mid clavicular line.  The gallbladder body was retracted superiorly and the infundibulum was retracted laterally.  The peritoneum overlying the cystic duct and artery was carefully taken down. Gallbladder was inflamed. The cystic duct and cystic artery were individually isolated and dissected free 360 degrees.The cystic duct tore with retraction. I was able to grab the stump and place a clip. No bile leak appreciated. The cystic artery was clipped proximally and distally and transected using endo umair.  Electrocautery was used to take the gallbladder off the liver bed.  The Endo-Catch bag was used to remove the gallbladder from the abdomen.  We irrigated out the right upper quadrant with irrigation.  We checked again and there was no evidence of any bile leak or bleeding from our clips or from the liver bed.  The patient tolerated the procedure well.  We removed all of our ports. We repaired the skin with 4-0 Monocryl.  After that was done, the patient was extubated and taken  to the recovery room in stable condition.  All lap and instrument counts were correct.    Estimated Blood Loss: 5 mL    Estimated Blood Loss has been documented.         Specimens:   Specimen (24h ago, onward)             Start     Ordered    08/08/22 1112  Specimen to Pathology, Surgery General Surgery  Once        Comments: Pre-op Diagnosis: Acute cholecystitis [K81.0]Procedure(s):CHOLECYSTECTOMY, LAPAROSCOPIC Number of specimens: 1Name of specimens: Gallbladder     References:    Click here for ordering Quick Tip   Question Answer Comment   Procedure Type: General Surgery    Specimen Class: Routine/Screening    Which provider would you like to cc? NEISHA CUELLO III    Release to patient Immediate        08/08/22 1112    Pending  Specimen to Pathology, Surgery  Once        Comments: Pre-op Diagnosis: Acute cholecystitis [K81.0]Procedure(s):CHOLECYSTECTOMY, LAPAROSCOPIC Number of specimens: 1Name of specimens: Gallbladder     References:    Click here for ordering Quick Tip   Question Answer Comment   Specimen Class: Routine/Screening    Which provider would you like to cc? NEISHA CUELLO III    Release to patient Immediate        Pending                [id number]

## 2022-08-08 NOTE — NURSING
Nurses Note -- 4 Eyes      8/7/2022   7:34 PM      Skin assessed during: Admit      [x] No Pressure Injuries Present    [x]Prevention Measures Documented      [] Yes- Altered Skin Integrity Present or Discovered   [] LDA Added if Not in Epic (Describe Wound)   [] New Altered Skin Integrity was Present on Admit and Documented in LDA   [] Wound Image Taken    Wound Care Consulted? No    Attending Nurse:  Navin Sauer RN     Second RN/Staff Member: Annette Casper RN

## 2022-08-08 NOTE — TRANSFER OF CARE
"Anesthesia Transfer of Care Note    Patient: Mena Ovalle    Procedure(s) Performed: Procedure(s) (LRB):  CHOLECYSTECTOMY, LAPAROSCOPIC (N/A)    Patient location: PACU    Anesthesia Type: general    Transport from OR: Transported from OR on 2-3 L/min O2 by NC with adequate spontaneous ventilation    Post pain: adequate analgesia    Post assessment: no apparent anesthetic complications and tolerated procedure well    Post vital signs: stable    Level of consciousness: awake, alert and oriented    Nausea/Vomiting: no nausea/vomiting    Complications: none    Transfer of care protocol was followed      Last vitals:   Visit Vitals  /71 (BP Location: Right arm, Patient Position: Lying)   Pulse 79   Temp 37.1 °C (98.8 °F) (Temporal)   Resp 16   Ht 5' 7" (1.702 m)   Wt 88.5 kg (195 lb)   LMP 07/24/2022   SpO2 99%   Breastfeeding No   BMI 30.54 kg/m²     "

## 2022-08-08 NOTE — ANESTHESIA POSTPROCEDURE EVALUATION
Anesthesia Post Evaluation    Patient: Mena Ovalle    Procedure(s) Performed: Procedure(s) (LRB):  CHOLECYSTECTOMY, LAPAROSCOPIC (N/A)    Final Anesthesia Type: general      Patient location during evaluation: PACU  Patient participation: Yes- Able to Participate  Level of consciousness: awake and alert  Post-procedure vital signs: reviewed and stable  Pain management: adequate  Airway patency: patent    PONV status at discharge: No PONV  Anesthetic complications: no      Cardiovascular status: blood pressure returned to baseline  Respiratory status: unassisted  Hydration status: euvolemic  Follow-up not needed.          Vitals Value Taken Time   /85 08/08/22 1315   Temp 36.9 °C (98.4 °F) 08/08/22 1315   Pulse 69 08/08/22 1315   Resp 16 08/08/22 1315   SpO2 97 % 08/08/22 1315         Event Time   Out of Recovery 08/08/2022 13:15:00         Pain/Wei Score: Pain Rating Prior to Med Admin: 5 (8/8/2022  1:00 PM)  Pain Rating Post Med Admin: 3 (8/8/2022  1:00 PM)  Wei Score: 9 (8/8/2022  1:00 PM)

## 2022-08-08 NOTE — PROGRESS NOTES
[facility] Medicine  Progress Note    Patient Name: Mena Ovalle  MRN: [id number]  Patient Class: OP- Observation   Admission Date: 8/7/2022  Length of Stay: 0 days  Attending Physician: Riky Ayala MD  Primary Care Provider: Primary Doctor No        Subjective:     Principal Problem:Acute cholecystitis        HPI:  Mena Ovalle is a 27 y/o female with no significant past medical history who presented to the emergency department with complaints of right upper quadrant abdominal pain accompanied by nausea and vomiting which began earlier this morning.  She had a similar episode about a week ago which resolved.  Denies recent fever, chest pain, diarrhea, dysuria, or any other symptoms.  CT abdomen and pelvis concerning for acute cholecystitis.  She will be placed in observation under the service of hospital Medicine.  General surgery has been consulted and plan surgical procedure tomorrow.      Overview/Hospital Course:  No notes on file    Interval History: no acute events overnight.  Plan lap cindy this morning.    Review of Systems   Constitutional:  Negative for appetite change, chills, fatigue and fever.   HENT:  Negative for sore throat and trouble swallowing.    Respiratory:  Negative for cough, shortness of breath and wheezing.    Cardiovascular:  Negative for chest pain and palpitations.   Gastrointestinal:  Positive for abdominal pain. Negative for diarrhea, nausea and vomiting.   Genitourinary:  Negative for dysuria and urgency.   Musculoskeletal:  Positive for back pain (chronic). Negative for arthralgias.   Skin:  Negative for pallor and rash.   Neurological:  Negative for weakness and light-headedness.   Psychiatric/Behavioral:  Negative for agitation and confusion.    All other systems reviewed and are negative.  Objective:     Vital Signs (Most Recent):  Temp: 98.8 °F (37.1 °C) (08/08/22 0916)  Pulse: 79 (08/08/22 0916)  Resp: 16 (08/08/22 0916)  BP:  114/71 (08/08/22 0916)  SpO2: 99 % (08/08/22 0916) Vital Signs (24h Range):  Temp:  [97.1 °F (36.2 °C)-98.8 °F (37.1 °C)] 98.8 °F (37.1 °C)  Pulse:  [66-81] 79  Resp:  [16-20] 16  SpO2:  [97 %-100 %] 99 %  BP: (108-127)/(64-85) 114/71     Weight: 88.5 kg (195 lb)  Body mass index is 30.54 kg/m².    Intake/Output Summary (Last 24 hours) at 8/8/2022 1212  Last data filed at 8/8/2022 1124  Gross per 24 hour   Intake 2107.04 ml   Output 5 ml   Net 2102.04 ml      Physical Exam  Constitutional:       General: She is not in acute distress.     Appearance: She is well-developed. She is not ill-appearing.   HENT:      Head: Normocephalic and atraumatic.   Eyes:      Conjunctiva/sclera: Conjunctivae normal.      Pupils: Pupils are equal, round, and reactive to light.   Cardiovascular:      Rate and Rhythm: Normal rate and regular rhythm.      Heart sounds: Normal heart sounds.   Pulmonary:      Effort: Pulmonary effort is normal. No respiratory distress.      Breath sounds: Normal breath sounds.   Abdominal:      General: Bowel sounds are normal. There is no distension.      Palpations: Abdomen is soft.      Tenderness: There is abdominal tenderness (RUQ).   Musculoskeletal:         General: Normal range of motion.      Cervical back: Normal range of motion and neck supple.   Skin:     General: Skin is warm and dry.      Capillary Refill: Capillary refill takes 2 to 3 seconds.   Neurological:      General: No focal deficit present.      Mental Status: She is alert and oriented to person, place, and time.   Psychiatric:         Mood and Affect: Mood normal.         Behavior: Behavior normal.         Thought Content: Thought content normal.         Judgment: Judgment normal.       Significant Labs: All pertinent labs within the past 24 hours have been reviewed.  CBC:   Recent Labs   Lab 08/07/22  1213 08/08/22  0413   WBC 15.02* 5.92   HGB 12.6 11.3*   HCT 38.4 35.3*    334     CMP:   Recent Labs   Lab 08/07/22  1213  08/08/22  0413    139   K 3.9 3.8    106   CO2 24 23   GLU 96 100   BUN 10 12   CREATININE 0.8 0.9   CALCIUM 9.4 8.7   PROT 8.0 6.9   ALBUMIN 4.1 3.4*   BILITOT 0.7 1.7*   ALKPHOS 94 137*   AST 48* 309*   ALT 36 281*   ANIONGAP 10 10       Significant Imaging: I have reviewed all pertinent imaging results/findings within the past 24 hours.      Assessment/Plan:      * Acute cholecystitis  Consult general surgery  NPO for surgery this morning  Pain control  IV antiemetics as needed      Transaminitis  Likely 2/2 acute cholecystitis  trend        VTE Risk Mitigation (From admission, onward)         Ordered     IP VTE HIGH RISK PATIENT  Once         08/07/22 1646     Place sequential compression device  Until discontinued         08/07/22 1646     Reason for No Pharmacological VTE Prophylaxis  Once        Question:  Reasons:  Answer:  Patient is Ambulatory    08/07/22 1646                Discharge Planning   KRISTY:      Code Status: Full Code   Is the patient medically ready for discharge?:     Reason for patient still in hospital (select all that apply): Patient trending condition, Laboratory test, Treatment and Consult recommendations                     JACKIE Kyle  Department of Hospital Medicine   [facility]

## 2022-08-09 PROBLEM — E80.6 HYPERBILIRUBINEMIA: Status: ACTIVE | Noted: 2022-08-09

## 2022-08-09 LAB
ALBUMIN SERPL BCP-MCNC: 3.5 G/DL (ref 3.5–5.2)
ALBUMIN SERPL BCP-MCNC: 3.5 G/DL (ref 3.5–5.2)
ALP SERPL-CCNC: 191 U/L (ref 55–135)
ALP SERPL-CCNC: 201 U/L (ref 55–135)
ALT SERPL W/O P-5'-P-CCNC: 413 U/L (ref 10–44)
ALT SERPL W/O P-5'-P-CCNC: 432 U/L (ref 10–44)
ANION GAP SERPL CALC-SCNC: 12 MMOL/L (ref 8–16)
ANION GAP SERPL CALC-SCNC: 9 MMOL/L (ref 8–16)
AST SERPL-CCNC: 274 U/L (ref 10–40)
AST SERPL-CCNC: 288 U/L (ref 10–40)
BASOPHILS # BLD AUTO: 0.05 K/UL (ref 0–0.2)
BASOPHILS NFR BLD: 0.2 % (ref 0–1.9)
BILIRUB SERPL-MCNC: 2.4 MG/DL (ref 0.1–1)
BILIRUB SERPL-MCNC: 2.5 MG/DL (ref 0.1–1)
BUN SERPL-MCNC: 7 MG/DL (ref 6–20)
BUN SERPL-MCNC: 8 MG/DL (ref 6–20)
CALCIUM SERPL-MCNC: 8.6 MG/DL (ref 8.7–10.5)
CALCIUM SERPL-MCNC: 8.9 MG/DL (ref 8.7–10.5)
CHLORIDE SERPL-SCNC: 107 MMOL/L (ref 95–110)
CHLORIDE SERPL-SCNC: 108 MMOL/L (ref 95–110)
CO2 SERPL-SCNC: 20 MMOL/L (ref 23–29)
CO2 SERPL-SCNC: 22 MMOL/L (ref 23–29)
CREAT SERPL-MCNC: 0.8 MG/DL (ref 0.5–1.4)
CREAT SERPL-MCNC: 0.8 MG/DL (ref 0.5–1.4)
DIFFERENTIAL METHOD: ABNORMAL
EOSINOPHIL # BLD AUTO: 0 K/UL (ref 0–0.5)
EOSINOPHIL NFR BLD: 0 % (ref 0–8)
ERYTHROCYTE [DISTWIDTH] IN BLOOD BY AUTOMATED COUNT: 13.5 % (ref 11.5–14.5)
EST. GFR  (NO RACE VARIABLE): >60 ML/MIN/1.73 M^2
EST. GFR  (NO RACE VARIABLE): >60 ML/MIN/1.73 M^2
GLUCOSE SERPL-MCNC: 124 MG/DL (ref 70–110)
GLUCOSE SERPL-MCNC: 129 MG/DL (ref 70–110)
HCT VFR BLD AUTO: 41.2 % (ref 37–48.5)
HGB BLD-MCNC: 13.3 G/DL (ref 12–16)
IMM GRANULOCYTES # BLD AUTO: 0.07 K/UL (ref 0–0.04)
IMM GRANULOCYTES NFR BLD AUTO: 0.3 % (ref 0–0.5)
LYMPHOCYTES # BLD AUTO: 1.8 K/UL (ref 1–4.8)
LYMPHOCYTES NFR BLD: 9 % (ref 18–48)
MAGNESIUM SERPL-MCNC: 1.9 MG/DL (ref 1.6–2.6)
MCH RBC QN AUTO: 27.7 PG (ref 27–31)
MCHC RBC AUTO-ENTMCNC: 32.3 G/DL (ref 32–36)
MCV RBC AUTO: 86 FL (ref 82–98)
MONOCYTES # BLD AUTO: 1.5 K/UL (ref 0.3–1)
MONOCYTES NFR BLD: 7.5 % (ref 4–15)
NEUTROPHILS # BLD AUTO: 16.7 K/UL (ref 1.8–7.7)
NEUTROPHILS NFR BLD: 83 % (ref 38–73)
NRBC BLD-RTO: 0 /100 WBC
PLATELET # BLD AUTO: 236 K/UL (ref 150–450)
PMV BLD AUTO: 12.2 FL (ref 9.2–12.9)
POTASSIUM SERPL-SCNC: 3.5 MMOL/L (ref 3.5–5.1)
POTASSIUM SERPL-SCNC: 3.6 MMOL/L (ref 3.5–5.1)
PROT SERPL-MCNC: 7 G/DL (ref 6–8.4)
PROT SERPL-MCNC: 7.3 G/DL (ref 6–8.4)
RBC # BLD AUTO: 4.8 M/UL (ref 4–5.4)
SODIUM SERPL-SCNC: 139 MMOL/L (ref 136–145)
SODIUM SERPL-SCNC: 139 MMOL/L (ref 136–145)
WBC # BLD AUTO: 20.12 K/UL (ref 3.9–12.7)

## 2022-08-09 PROCEDURE — 12000002 HC ACUTE/MED SURGE SEMI-PRIVATE ROOM

## 2022-08-09 PROCEDURE — 94761 N-INVAS EAR/PLS OXIMETRY MLT: CPT

## 2022-08-09 PROCEDURE — 25000003 PHARM REV CODE 250: Performed by: SURGERY

## 2022-08-09 PROCEDURE — 63600175 PHARM REV CODE 636 W HCPCS: Performed by: INTERNAL MEDICINE

## 2022-08-09 PROCEDURE — 25000003 PHARM REV CODE 250: Performed by: NURSE PRACTITIONER

## 2022-08-09 PROCEDURE — 85025 COMPLETE CBC W/AUTO DIFF WBC: CPT | Performed by: SURGERY

## 2022-08-09 PROCEDURE — 94799 UNLISTED PULMONARY SVC/PX: CPT

## 2022-08-09 PROCEDURE — 63600175 PHARM REV CODE 636 W HCPCS: Performed by: SURGERY

## 2022-08-09 PROCEDURE — 83735 ASSAY OF MAGNESIUM: CPT | Performed by: SURGERY

## 2022-08-09 PROCEDURE — 80053 COMPREHEN METABOLIC PANEL: CPT | Performed by: SURGERY

## 2022-08-09 PROCEDURE — 36415 COLL VENOUS BLD VENIPUNCTURE: CPT | Performed by: SURGERY

## 2022-08-09 PROCEDURE — 99900035 HC TECH TIME PER 15 MIN (STAT)

## 2022-08-09 RX ORDER — HYDROMORPHONE HYDROCHLORIDE 1 MG/ML
2 INJECTION, SOLUTION INTRAMUSCULAR; INTRAVENOUS; SUBCUTANEOUS EVERY 6 HOURS PRN
Status: DISCONTINUED | OUTPATIENT
Start: 2022-08-09 | End: 2022-08-11

## 2022-08-09 RX ORDER — MORPHINE SULFATE 2 MG/ML
2 INJECTION, SOLUTION INTRAMUSCULAR; INTRAVENOUS ONCE
Status: COMPLETED | OUTPATIENT
Start: 2022-08-09 | End: 2022-08-09

## 2022-08-09 RX ORDER — MORPHINE SULFATE 2 MG/ML
2 INJECTION, SOLUTION INTRAMUSCULAR; INTRAVENOUS EVERY 4 HOURS PRN
Status: DISCONTINUED | OUTPATIENT
Start: 2022-08-09 | End: 2022-08-09

## 2022-08-09 RX ORDER — MORPHINE SULFATE 2 MG/ML
4 INJECTION, SOLUTION INTRAMUSCULAR; INTRAVENOUS EVERY 4 HOURS PRN
Status: DISCONTINUED | OUTPATIENT
Start: 2022-08-09 | End: 2022-08-09

## 2022-08-09 RX ORDER — ONDANSETRON 4 MG/1
8 TABLET, ORALLY DISINTEGRATING ORAL EVERY 8 HOURS PRN
Qty: 12 TABLET | Refills: 0 | Status: ON HOLD | OUTPATIENT
Start: 2022-08-09 | End: 2022-08-22 | Stop reason: SDUPTHER

## 2022-08-09 RX ORDER — SIMETHICONE 80 MG
2 TABLET,CHEWABLE ORAL 4 TIMES DAILY PRN
Status: DISCONTINUED | OUTPATIENT
Start: 2022-08-09 | End: 2022-08-12 | Stop reason: HOSPADM

## 2022-08-09 RX ORDER — HYDROCODONE BITARTRATE AND ACETAMINOPHEN 5; 325 MG/1; MG/1
1 TABLET ORAL EVERY 6 HOURS PRN
Qty: 12 TABLET | Refills: 0 | Status: ON HOLD | OUTPATIENT
Start: 2022-08-09 | End: 2022-08-22 | Stop reason: SDUPTHER

## 2022-08-09 RX ADMIN — HYDROMORPHONE HYDROCHLORIDE 2 MG: 1 INJECTION, SOLUTION INTRAMUSCULAR; INTRAVENOUS; SUBCUTANEOUS at 09:08

## 2022-08-09 RX ADMIN — KETOROLAC TROMETHAMINE 15 MG: 30 INJECTION, SOLUTION INTRAMUSCULAR; INTRAVENOUS at 03:08

## 2022-08-09 RX ADMIN — HYDROCODONE BITARTRATE AND ACETAMINOPHEN 1 TABLET: 5; 325 TABLET ORAL at 02:08

## 2022-08-09 RX ADMIN — SODIUM CHLORIDE: 0.9 INJECTION, SOLUTION INTRAVENOUS at 09:08

## 2022-08-09 RX ADMIN — HYDROCODONE BITARTRATE AND ACETAMINOPHEN 1 TABLET: 5; 325 TABLET ORAL at 08:08

## 2022-08-09 RX ADMIN — SIMETHICONE 80 MG: 80 TABLET, CHEWABLE ORAL at 03:08

## 2022-08-09 RX ADMIN — KETOROLAC TROMETHAMINE 15 MG: 30 INJECTION, SOLUTION INTRAMUSCULAR; INTRAVENOUS at 05:08

## 2022-08-09 RX ADMIN — ONDANSETRON 4 MG: 2 INJECTION INTRAMUSCULAR; INTRAVENOUS at 09:08

## 2022-08-09 RX ADMIN — ONDANSETRON 4 MG: 2 INJECTION INTRAMUSCULAR; INTRAVENOUS at 03:08

## 2022-08-09 RX ADMIN — PIPERACILLIN SODIUM AND TAZOBACTAM SODIUM 4.5 G: 4; .5 INJECTION, POWDER, LYOPHILIZED, FOR SOLUTION INTRAVENOUS at 01:08

## 2022-08-09 RX ADMIN — PIPERACILLIN SODIUM AND TAZOBACTAM SODIUM 4.5 G: 4; .5 INJECTION, POWDER, LYOPHILIZED, FOR SOLUTION INTRAVENOUS at 09:08

## 2022-08-09 RX ADMIN — PIPERACILLIN SODIUM AND TAZOBACTAM SODIUM 4.5 G: 4; .5 INJECTION, POWDER, LYOPHILIZED, FOR SOLUTION INTRAVENOUS at 06:08

## 2022-08-09 RX ADMIN — KETOROLAC TROMETHAMINE 15 MG: 30 INJECTION, SOLUTION INTRAMUSCULAR; INTRAVENOUS at 11:08

## 2022-08-09 RX ADMIN — SIMETHICONE 160 MG: 80 TABLET, CHEWABLE ORAL at 11:08

## 2022-08-09 RX ADMIN — MORPHINE SULFATE 4 MG: 2 INJECTION, SOLUTION INTRAMUSCULAR; INTRAVENOUS at 06:08

## 2022-08-09 RX ADMIN — MUPIROCIN 1 G: 20 OINTMENT TOPICAL at 09:08

## 2022-08-09 RX ADMIN — MUPIROCIN 1 G: 20 OINTMENT TOPICAL at 08:08

## 2022-08-09 RX ADMIN — SENNOSIDES AND DOCUSATE SODIUM 1 TABLET: 50; 8.6 TABLET ORAL at 09:08

## 2022-08-09 RX ADMIN — MORPHINE SULFATE 2 MG: 2 INJECTION, SOLUTION INTRAMUSCULAR; INTRAVENOUS at 11:08

## 2022-08-09 RX ADMIN — SENNOSIDES AND DOCUSATE SODIUM 1 TABLET: 50; 8.6 TABLET ORAL at 08:08

## 2022-08-09 NOTE — ASSESSMENT & PLAN NOTE
POD 1 s/p laparoscopic cholecystectomy with Dr. Mendoza  Continue to follow recommendations of surgeon.  Needs aggressive incentive spirometry.  Follow hemoglobin and hematocrit closely.  Pain control.  Encourage OOB, ambulation

## 2022-08-09 NOTE — PLAN OF CARE
POC reviewed with patient. Understanding verbalized. Abdominal incisions x's 4 with Dermabond intact. No redness or swelling noted to sites. IV intact and infusing. Patient alert and oriented. X4. Pain and nausea managed with prn medications. Patient up to bathroom with minimal assistance. Safety maintained. Call light in reach and instructed to call for assistance.

## 2022-08-09 NOTE — NURSING
Received call from [facility] [phone] w patient transportation to [facility]. NPO after midnight. Pt will be transferred to [facility] tomorrow for  at 12:15 on  8/10/22 via [facility] for ERCP scheduled for 2:30 pm by Dr Ruddy Randall then return to [facility] afterwards. Family at bedside when call came in.

## 2022-08-09 NOTE — PROGRESS NOTES
[facility] Hospital Medicine  Progress Note    Patient Name: Mena Ovalle  MRN: [id number]  Patient Class: OP- Observation   Admission Date: 8/7/2022  Length of Stay: 0 days  Attending Physician: Jodee Garrett MD  Primary Care Provider: Francisco Camargo III, MD        Subjective:     Principal Problem:Acute cholecystitis        HPI:  Mena Ovalle is a 25 y/o female with no significant past medical history who presented to the emergency department with complaints of right upper quadrant abdominal pain accompanied by nausea and vomiting which began earlier this morning.  She had a similar episode about a week ago which resolved.  Denies recent fever, chest pain, diarrhea, dysuria, or any other symptoms.  CT abdomen and pelvis concerning for acute cholecystitis.  She will be placed in observation under the service of hospital Medicine.  General surgery has been consulted and plan surgical procedure tomorrow.      Overview/Hospital Course:  No notes on file    Interval History: seen and examined this morning.  Discussed lab findings with patient.  WBC increased with elevated LFT's.  Continue IV abx for now.  C/o increased pain not controlled by oral narcotics.  IV morphine x 1 ordered.  PRN simethicone given per nursing.  Encouraged OOB, ambulation.    Review of Systems   Constitutional:  Negative for appetite change, chills, fatigue and fever.   HENT:  Negative for sore throat and trouble swallowing.    Respiratory:  Negative for cough, shortness of breath and wheezing.    Cardiovascular:  Negative for chest pain and palpitations.   Gastrointestinal:  Positive for abdominal pain and nausea. Negative for diarrhea and vomiting.   Genitourinary:  Negative for dysuria and urgency.   Musculoskeletal:  Positive for back pain (chronic). Negative for arthralgias.   Skin:  Negative for pallor and rash.   Neurological:  Negative for weakness and light-headedness.    Psychiatric/Behavioral:  Positive for sleep disturbance. Negative for agitation and confusion.    All other systems reviewed and are negative.  Objective:     Vital Signs (Most Recent):  Temp: 98.3 °F (36.8 °C) (08/09/22 1128)  Pulse: 68 (08/09/22 1128)  Resp: 18 (08/09/22 1128)  BP: 136/87 (08/09/22 1128)  SpO2: 98 % (08/09/22 1128) Vital Signs (24h Range):  Temp:  [97.2 °F (36.2 °C)-98.4 °F (36.9 °C)] 98.3 °F (36.8 °C)  Pulse:  [68-83] 68  Resp:  [15-20] 18  SpO2:  [97 %-100 %] 98 %  BP: (117-138)/(66-91) 136/87     Weight: 88.5 kg (195 lb)  Body mass index is 30.54 kg/m².    Intake/Output Summary (Last 24 hours) at 8/9/2022 1202  Last data filed at 8/9/2022 0535  Gross per 24 hour   Intake 1416.74 ml   Output --   Net 1416.74 ml      Physical Exam  Constitutional:       General: She is not in acute distress.     Appearance: She is well-developed. She is not ill-appearing.   HENT:      Head: Normocephalic and atraumatic.      Mouth/Throat:      Mouth: Mucous membranes are dry.   Eyes:      Conjunctiva/sclera: Conjunctivae normal.      Pupils: Pupils are equal, round, and reactive to light.   Cardiovascular:      Rate and Rhythm: Normal rate and regular rhythm.      Heart sounds: Normal heart sounds.   Pulmonary:      Effort: Pulmonary effort is normal. No respiratory distress.      Breath sounds: Normal breath sounds.   Abdominal:      General: Bowel sounds are normal. There is no distension.      Palpations: Abdomen is soft.      Tenderness: There is abdominal tenderness (generalized).   Musculoskeletal:         General: Normal range of motion.      Cervical back: Normal range of motion and neck supple.   Skin:     General: Skin is warm and dry.      Capillary Refill: Capillary refill takes 2 to 3 seconds.      Comments: Lap sites well approximated, clean and dry.   Neurological:      General: No focal deficit present.      Mental Status: She is alert and oriented to person, place, and time.   Psychiatric:          Mood and Affect: Mood normal.         Behavior: Behavior normal.         Thought Content: Thought content normal.         Judgment: Judgment normal.       Significant Labs: All pertinent labs within the past 24 hours have been reviewed.  CBC:   Recent Labs   Lab 08/07/22  1213 08/08/22  0413 08/09/22  0442   WBC 15.02* 5.92 20.12*   HGB 12.6 11.3* 13.3   HCT 38.4 35.3* 41.2    334 236     CMP:   Recent Labs   Lab 08/07/22  1213 08/08/22  0413 08/09/22  0442    139 139  139   K 3.9 3.8 3.6  3.5    106 108  107   CO2 24 23 22*  20*   GLU 96 100 129*  124*   BUN 10 12 8  7   CREATININE 0.8 0.9 0.8  0.8   CALCIUM 9.4 8.7 8.6*  8.9   PROT 8.0 6.9 7.0  7.3   ALBUMIN 4.1 3.4* 3.5  3.5   BILITOT 0.7 1.7* 2.4*  2.5*   ALKPHOS 94 137* 191*  201*   AST 48* 309* 274*  288*   ALT 36 281* 413*  432*   ANIONGAP 10 10 9  12       Significant Imaging: I have reviewed all pertinent imaging results/findings within the past 24 hours.      Assessment/Plan:      * Acute cholecystitis  POD 1 s/p laparoscopic cholecystectomy with Dr. Mendoza  Continue to follow recommendations of surgeon.  Needs aggressive incentive spirometry.  Follow hemoglobin and hematocrit closely.  Pain control.  Encourage OOB, ambulation      Hyperbilirubinemia  Tbili 0.7 on admission, today 2.4, likely 2/2 acute cholecystitis with laparoscopic cholecystectomy.  trend      Transaminitis  Likely 2/2 acute cholecystitis  trend        VTE Risk Mitigation (From admission, onward)         Ordered     IP VTE HIGH RISK PATIENT  Once         08/07/22 1646     Place sequential compression device  Until discontinued         08/07/22 1646     Reason for No Pharmacological VTE Prophylaxis  Once        Question:  Reasons:  Answer:  Patient is Ambulatory    08/07/22 1646                Discharge Planning   KRISTY: 8/9/2022     Code Status: Full Code   Is the patient medically ready for discharge?:     Reason for patient still in hospital  (select all that apply): Patient trending condition, Laboratory test, Treatment and Consult recommendations  Discharge Plan A: Home with family                  JACKIE Kyle  Department of Hospital Medicine   [facility]

## 2022-08-09 NOTE — ASSESSMENT & PLAN NOTE
Tbili 0.7 on admission, today 2.4, likely 2/2 acute cholecystitis with laparoscopic cholecystectomy.  trend

## 2022-08-09 NOTE — PLAN OF CARE
Pt not cleared from gen sx standpoint due to WBC increasing as well as elevated LFT and bili. MRCP ordered. CM continuing to follow

## 2022-08-09 NOTE — PROGRESS NOTES
[facility] Surgery  Progress Note    Subjective:     Interval History: Pain significant today. LFT's higher with tbili at 2.4. MRCP showed dilated CBD to 9 mm with distal stone and ascites that could be consistent with bile.     Post-Op Info:  Procedure(s) (LRB):  CHOLECYSTECTOMY, LAPAROSCOPIC (N/A)   1 Day Post-Op      Medications:  Continuous Infusions:   sodium chloride 0.9% 125 mL/hr at 08/09/22 0535     Scheduled Meds:   mupirocin  1 g Nasal BID    piperacillin-tazobactam (ZOSYN) IVPB  4.5 g Intravenous Q8H    senna-docusate 8.6-50 mg  1 tablet Oral BID     PRN Meds:acetaminophen, albuterol-ipratropium, aluminum-magnesium hydroxide-simethicone, dextrose 10%, dextrose 10%, glucagon (human recombinant), glucose, glucose, HYDROcodone-acetaminophen, HYDROmorphone, ketorolac, magnesium oxide, magnesium oxide, melatonin, morphine, morphine, naloxone, ondansetron, ondansetron, potassium bicarbonate, potassium bicarbonate, potassium bicarbonate, potassium, sodium phosphates, potassium, sodium phosphates, potassium, sodium phosphates, simethicone, sodium chloride 0.9%     Objective:     Vital Signs (Most Recent):  Temp: 97.7 °F (36.5 °C) (08/09/22 1614)  Pulse: 66 (08/09/22 1614)  Resp: 18 (08/09/22 1824)  BP: (!) 151/86 (08/09/22 1614)  SpO2: 98 % (08/09/22 1614) Vital Signs (24h Range):  Temp:  [97.2 °F (36.2 °C)-98.3 °F (36.8 °C)] 97.7 °F (36.5 °C)  Pulse:  [66-83] 66  Resp:  [14-18] 18  SpO2:  [98 %-99 %] 98 %  BP: (117-151)/(66-87) 151/86       Intake/Output Summary (Last 24 hours) at 8/9/2022 1832  Last data filed at 8/9/2022 1200  Gross per 24 hour   Intake 1397.13 ml   Output --   Net 1397.13 ml       Physical Exam    Significant Labs:  CBC:   Recent Labs   Lab 08/09/22 0442   WBC 20.12*   RBC 4.80   HGB 13.3   HCT 41.2      MCV 86   MCH 27.7   MCHC 32.3     CMP:   Recent Labs   Lab 08/09/22 0442   *  124*   CALCIUM 8.6*  8.9   ALBUMIN 3.5  3.5   PROT 7.0  7.3      139   K 3.6  3.5   CO2 22*  20*     107   BUN 8  7   CREATININE 0.8  0.8   ALKPHOS 191*  201*   *  432*   *  288*   BILITOT 2.4*  2.5*       Significant Diagnostics:  I have reviewed all pertinent imaging results/findings within the past 24 hours.    Assessment/Plan:     Active Diagnoses:    Diagnosis Date Noted POA    PRINCIPAL PROBLEM:  Acute cholecystitis [K81.0] 08/07/2022 Yes    Hyperbilirubinemia [E80.6] 08/09/2022 Yes    Transaminitis [R74.01] 08/08/2022 Yes      Problems Resolved During this Admission:     -LFT's higher. MRCP evidence of distal duct stone with obstruction and with cystic duct blowout and leak. Discussed case with entire team. Plan for transfer to [facility] for ERCP, stone extraction and stent for leak.     Virgil Mendoza III, MD  General Surgery  [facility]

## 2022-08-09 NOTE — PLAN OF CARE
Plan of care reviewed with patient. Understanding verbalized. Abdominal incisions x's 4 with Dermabond intact. No redness or swelling noted to sites. IV intact and infusing. Patient alert and oriented. Pain and nausea managed with prn medications. Patient up to bathroom/BSC with minimal assistance. Episode of nausea after taking pain medication on empty stomach. Zofran IVP per Jarrel given. Safety maintained. Call light in reach and instructed to call for assistance.

## 2022-08-09 NOTE — NURSING
Escorted down to CT via WC per [facility]porter for MRCP. Given Pain pill became nauseated and vomited small amount while sitting up.

## 2022-08-09 NOTE — SUBJECTIVE & OBJECTIVE
Interval History: seen and examined this morning.  Discussed lab findings with patient.  WBC increased with elevated LFT's.  Continue IV abx for now.  C/o increased pain not controlled by oral narcotics.  IV morphine x 1 ordered.  PRN simethicone given per nursing.  Encouraged OOB, ambulation.    Review of Systems   Constitutional:  Negative for appetite change, chills, fatigue and fever.   HENT:  Negative for sore throat and trouble swallowing.    Respiratory:  Negative for cough, shortness of breath and wheezing.    Cardiovascular:  Negative for chest pain and palpitations.   Gastrointestinal:  Positive for abdominal pain and nausea. Negative for diarrhea and vomiting.   Genitourinary:  Negative for dysuria and urgency.   Musculoskeletal:  Positive for back pain (chronic). Negative for arthralgias.   Skin:  Negative for pallor and rash.   Neurological:  Negative for weakness and light-headedness.   Psychiatric/Behavioral:  Positive for sleep disturbance. Negative for agitation and confusion.    All other systems reviewed and are negative.  Objective:     Vital Signs (Most Recent):  Temp: 98.3 °F (36.8 °C) (08/09/22 1128)  Pulse: 68 (08/09/22 1128)  Resp: 18 (08/09/22 1128)  BP: 136/87 (08/09/22 1128)  SpO2: 98 % (08/09/22 1128) Vital Signs (24h Range):  Temp:  [97.2 °F (36.2 °C)-98.4 °F (36.9 °C)] 98.3 °F (36.8 °C)  Pulse:  [68-83] 68  Resp:  [15-20] 18  SpO2:  [97 %-100 %] 98 %  BP: (117-138)/(66-91) 136/87     Weight: 88.5 kg (195 lb)  Body mass index is 30.54 kg/m².    Intake/Output Summary (Last 24 hours) at 8/9/2022 1202  Last data filed at 8/9/2022 0535  Gross per 24 hour   Intake 1416.74 ml   Output --   Net 1416.74 ml      Physical Exam  Constitutional:       General: She is not in acute distress.     Appearance: She is well-developed. She is not ill-appearing.   HENT:      Head: Normocephalic and atraumatic.      Mouth/Throat:      Mouth: Mucous membranes are dry.   Eyes:      Conjunctiva/sclera:  Conjunctivae normal.      Pupils: Pupils are equal, round, and reactive to light.   Cardiovascular:      Rate and Rhythm: Normal rate and regular rhythm.      Heart sounds: Normal heart sounds.   Pulmonary:      Effort: Pulmonary effort is normal. No respiratory distress.      Breath sounds: Normal breath sounds.   Abdominal:      General: Bowel sounds are normal. There is no distension.      Palpations: Abdomen is soft.      Tenderness: There is abdominal tenderness (generalized).   Musculoskeletal:         General: Normal range of motion.      Cervical back: Normal range of motion and neck supple.   Skin:     General: Skin is warm and dry.      Capillary Refill: Capillary refill takes 2 to 3 seconds.      Comments: Lap sites well approximated, clean and dry.   Neurological:      General: No focal deficit present.      Mental Status: She is alert and oriented to person, place, and time.   Psychiatric:         Mood and Affect: Mood normal.         Behavior: Behavior normal.         Thought Content: Thought content normal.         Judgment: Judgment normal.       Significant Labs: All pertinent labs within the past 24 hours have been reviewed.  CBC:   Recent Labs   Lab 08/07/22  1213 08/08/22  0413 08/09/22  0442   WBC 15.02* 5.92 20.12*   HGB 12.6 11.3* 13.3   HCT 38.4 35.3* 41.2    334 236     CMP:   Recent Labs   Lab 08/07/22  1213 08/08/22  0413 08/09/22  0442    139 139  139   K 3.9 3.8 3.6  3.5    106 108  107   CO2 24 23 22*  20*   GLU 96 100 129*  124*   BUN 10 12 8  7   CREATININE 0.8 0.9 0.8  0.8   CALCIUM 9.4 8.7 8.6*  8.9   PROT 8.0 6.9 7.0  7.3   ALBUMIN 4.1 3.4* 3.5  3.5   BILITOT 0.7 1.7* 2.4*  2.5*   ALKPHOS 94 137* 191*  201*   AST 48* 309* 274*  288*   ALT 36 281* 413*  432*   ANIONGAP 10 10 9  12       Significant Imaging: I have reviewed all pertinent imaging results/findings within the past 24 hours.

## 2022-08-09 NOTE — CARE UPDATE
Transfer to [facility] initiated after discussion with Dr. Mendoza regarding MRCP findings concerning for choledocholithiasis.  She will need ERCP.  Discussed with patient/charge nurse.

## 2022-08-10 ENCOUNTER — ANESTHESIA EVENT (OUTPATIENT)
Dept: SURGERY | Facility: HOSPITAL | Age: 27
End: 2022-08-10
Payer: MEDICAID

## 2022-08-10 ENCOUNTER — HOSPITAL ENCOUNTER (OUTPATIENT)
Dept: RADIOLOGY | Facility: HOSPITAL | Age: 27
Discharge: HOME | End: 2022-08-10
Attending: INTERNAL MEDICINE
Payer: MEDICAID

## 2022-08-10 ENCOUNTER — ANESTHESIA (OUTPATIENT)
Dept: SURGERY | Facility: HOSPITAL | Age: 27
End: 2022-08-10
Payer: MEDICAID

## 2022-08-10 PROBLEM — K80.43 CHOLEDOCHOLITHIASIS WITH ACUTE CHOLECYSTITIS WITH OBSTRUCTION: Status: ACTIVE | Noted: 2022-08-10

## 2022-08-10 LAB
ALBUMIN SERPL BCP-MCNC: 3.1 G/DL (ref 3.5–5.2)
ALP SERPL-CCNC: 204 U/L (ref 55–135)
ALT SERPL W/O P-5'-P-CCNC: 301 U/L (ref 10–44)
ANION GAP SERPL CALC-SCNC: 11 MMOL/L (ref 8–16)
AST SERPL-CCNC: 118 U/L (ref 10–40)
BASOPHILS # BLD AUTO: 0.03 K/UL (ref 0–0.2)
BASOPHILS NFR BLD: 0.2 % (ref 0–1.9)
BILIRUB SERPL-MCNC: 1.5 MG/DL (ref 0.1–1)
BUN SERPL-MCNC: 15 MG/DL (ref 6–20)
CALCIUM SERPL-MCNC: 8.5 MG/DL (ref 8.7–10.5)
CHLORIDE SERPL-SCNC: 108 MMOL/L (ref 95–110)
CO2 SERPL-SCNC: 20 MMOL/L (ref 23–29)
CREAT SERPL-MCNC: 0.7 MG/DL (ref 0.5–1.4)
DIFFERENTIAL METHOD: ABNORMAL
EOSINOPHIL # BLD AUTO: 0 K/UL (ref 0–0.5)
EOSINOPHIL NFR BLD: 0 % (ref 0–8)
ERYTHROCYTE [DISTWIDTH] IN BLOOD BY AUTOMATED COUNT: 14 % (ref 11.5–14.5)
EST. GFR  (NO RACE VARIABLE): >60 ML/MIN/1.73 M^2
GLUCOSE SERPL-MCNC: 94 MG/DL (ref 70–110)
HCT VFR BLD AUTO: 41.3 % (ref 37–48.5)
HGB BLD-MCNC: 13.3 G/DL (ref 12–16)
IMM GRANULOCYTES # BLD AUTO: 0.09 K/UL (ref 0–0.04)
IMM GRANULOCYTES NFR BLD AUTO: 0.5 % (ref 0–0.5)
LYMPHOCYTES # BLD AUTO: 1.2 K/UL (ref 1–4.8)
LYMPHOCYTES NFR BLD: 6.2 % (ref 18–48)
MAGNESIUM SERPL-MCNC: 1.9 MG/DL (ref 1.6–2.6)
MCH RBC QN AUTO: 27.5 PG (ref 27–31)
MCHC RBC AUTO-ENTMCNC: 32.2 G/DL (ref 32–36)
MCV RBC AUTO: 86 FL (ref 82–98)
MONOCYTES # BLD AUTO: 1 K/UL (ref 0.3–1)
MONOCYTES NFR BLD: 5.2 % (ref 4–15)
NEUTROPHILS # BLD AUTO: 17.4 K/UL (ref 1.8–7.7)
NEUTROPHILS NFR BLD: 87.9 % (ref 38–73)
NRBC BLD-RTO: 0 /100 WBC
PLATELET # BLD AUTO: 365 K/UL (ref 150–450)
PMV BLD AUTO: 11.5 FL (ref 9.2–12.9)
POTASSIUM SERPL-SCNC: 3.6 MMOL/L (ref 3.5–5.1)
PROT SERPL-MCNC: 6.5 G/DL (ref 6–8.4)
RBC # BLD AUTO: 4.83 M/UL (ref 4–5.4)
SODIUM SERPL-SCNC: 139 MMOL/L (ref 136–145)
WBC # BLD AUTO: 19.77 K/UL (ref 3.9–12.7)

## 2022-08-10 PROCEDURE — 36415 COLL VENOUS BLD VENIPUNCTURE: CPT | Performed by: SURGERY

## 2022-08-10 PROCEDURE — 25000003 PHARM REV CODE 250: Performed by: SURGERY

## 2022-08-10 PROCEDURE — 83735 ASSAY OF MAGNESIUM: CPT | Performed by: SURGERY

## 2022-08-10 PROCEDURE — 12000002 HC ACUTE/MED SURGE SEMI-PRIVATE ROOM

## 2022-08-10 PROCEDURE — 27000221 HC OXYGEN, UP TO 24 HOURS

## 2022-08-10 PROCEDURE — 63600175 PHARM REV CODE 636 W HCPCS: Performed by: NURSE ANESTHETIST, CERTIFIED REGISTERED

## 2022-08-10 PROCEDURE — 80053 COMPREHEN METABOLIC PANEL: CPT | Performed by: SURGERY

## 2022-08-10 PROCEDURE — 94799 UNLISTED PULMONARY SVC/PX: CPT

## 2022-08-10 PROCEDURE — 85025 COMPLETE CBC W/AUTO DIFF WBC: CPT | Performed by: SURGERY

## 2022-08-10 PROCEDURE — 99900035 HC TECH TIME PER 15 MIN (STAT)

## 2022-08-10 PROCEDURE — 63600175 PHARM REV CODE 636 W HCPCS: Performed by: SURGERY

## 2022-08-10 PROCEDURE — 94761 N-INVAS EAR/PLS OXIMETRY MLT: CPT

## 2022-08-10 PROCEDURE — 25000003 PHARM REV CODE 250: Performed by: NURSE ANESTHETIST, CERTIFIED REGISTERED

## 2022-08-10 PROCEDURE — 74330 X-RAY BILE/PANC ENDOSCOPY: CPT | Mod: TC

## 2022-08-10 RX ORDER — MIDAZOLAM HYDROCHLORIDE 1 MG/ML
INJECTION INTRAMUSCULAR; INTRAVENOUS
Status: DISCONTINUED | OUTPATIENT
Start: 2022-08-10 | End: 2022-08-10

## 2022-08-10 RX ORDER — DEXAMETHASONE SODIUM PHOSPHATE 4 MG/ML
INJECTION, SOLUTION INTRA-ARTICULAR; INTRALESIONAL; INTRAMUSCULAR; INTRAVENOUS; SOFT TISSUE
Status: DISCONTINUED | OUTPATIENT
Start: 2022-08-10 | End: 2022-08-10

## 2022-08-10 RX ORDER — LIDOCAINE HYDROCHLORIDE 20 MG/ML
INJECTION, SOLUTION EPIDURAL; INFILTRATION; INTRACAUDAL; PERINEURAL
Status: DISCONTINUED | OUTPATIENT
Start: 2022-08-10 | End: 2022-08-10

## 2022-08-10 RX ORDER — GLUCAGON 1 MG
1 KIT INJECTION
Status: CANCELLED | OUTPATIENT
Start: 2022-08-10

## 2022-08-10 RX ORDER — FAMOTIDINE 10 MG/ML
INJECTION INTRAVENOUS
Status: DISCONTINUED | OUTPATIENT
Start: 2022-08-10 | End: 2022-08-10

## 2022-08-10 RX ORDER — AMOXICILLIN 250 MG
1 CAPSULE ORAL 2 TIMES DAILY
Status: CANCELLED | OUTPATIENT
Start: 2022-08-10

## 2022-08-10 RX ORDER — SODIUM,POTASSIUM PHOSPHATES 280-250MG
2 POWDER IN PACKET (EA) ORAL
Status: CANCELLED | OUTPATIENT
Start: 2022-08-10

## 2022-08-10 RX ORDER — LANOLIN ALCOHOL/MO/W.PET/CERES
800 CREAM (GRAM) TOPICAL
Status: CANCELLED | OUTPATIENT
Start: 2022-08-10

## 2022-08-10 RX ORDER — SUCCINYLCHOLINE CHLORIDE 20 MG/ML
INJECTION INTRAMUSCULAR; INTRAVENOUS
Status: DISCONTINUED | OUTPATIENT
Start: 2022-08-10 | End: 2022-08-10

## 2022-08-10 RX ORDER — SODIUM CHLORIDE, SODIUM LACTATE, POTASSIUM CHLORIDE, CALCIUM CHLORIDE 600; 310; 30; 20 MG/100ML; MG/100ML; MG/100ML; MG/100ML
INJECTION, SOLUTION INTRAVENOUS CONTINUOUS PRN
Status: DISCONTINUED | OUTPATIENT
Start: 2022-08-10 | End: 2022-08-10

## 2022-08-10 RX ORDER — FENTANYL CITRATE 50 UG/ML
INJECTION, SOLUTION INTRAMUSCULAR; INTRAVENOUS
Status: DISCONTINUED | OUTPATIENT
Start: 2022-08-10 | End: 2022-08-10

## 2022-08-10 RX ORDER — TALC
6 POWDER (GRAM) TOPICAL NIGHTLY PRN
Status: CANCELLED | OUTPATIENT
Start: 2022-08-10

## 2022-08-10 RX ORDER — IPRATROPIUM BROMIDE AND ALBUTEROL SULFATE 2.5; .5 MG/3ML; MG/3ML
3 SOLUTION RESPIRATORY (INHALATION) EVERY 4 HOURS PRN
Status: CANCELLED | OUTPATIENT
Start: 2022-08-10

## 2022-08-10 RX ORDER — ACETAMINOPHEN 325 MG/1
650 TABLET ORAL EVERY 8 HOURS PRN
Status: CANCELLED | OUTPATIENT
Start: 2022-08-10

## 2022-08-10 RX ORDER — ONDANSETRON 4 MG/1
8 TABLET, ORALLY DISINTEGRATING ORAL EVERY 6 HOURS PRN
Status: CANCELLED | OUTPATIENT
Start: 2022-08-10

## 2022-08-10 RX ORDER — LIDOCAINE HYDROCHLORIDE 20 MG/ML
JELLY TOPICAL
Status: DISCONTINUED | OUTPATIENT
Start: 2022-08-10 | End: 2022-08-10

## 2022-08-10 RX ORDER — IBUPROFEN 200 MG
24 TABLET ORAL
Status: CANCELLED | OUTPATIENT
Start: 2022-08-10

## 2022-08-10 RX ORDER — MAG HYDROX/ALUMINUM HYD/SIMETH 200-200-20
30 SUSPENSION, ORAL (FINAL DOSE FORM) ORAL 4 TIMES DAILY PRN
Status: CANCELLED | OUTPATIENT
Start: 2022-08-10

## 2022-08-10 RX ORDER — ONDANSETRON 2 MG/ML
4 INJECTION INTRAMUSCULAR; INTRAVENOUS EVERY 8 HOURS PRN
Status: CANCELLED | OUTPATIENT
Start: 2022-08-10

## 2022-08-10 RX ORDER — SIMETHICONE 80 MG
2 TABLET,CHEWABLE ORAL 4 TIMES DAILY PRN
Status: CANCELLED | OUTPATIENT
Start: 2022-08-10

## 2022-08-10 RX ORDER — MUPIROCIN 20 MG/G
1 OINTMENT TOPICAL 2 TIMES DAILY
Status: CANCELLED | OUTPATIENT
Start: 2022-08-10 | End: 2022-08-13

## 2022-08-10 RX ORDER — PROPOFOL 10 MG/ML
VIAL (ML) INTRAVENOUS
Status: DISCONTINUED | OUTPATIENT
Start: 2022-08-10 | End: 2022-08-10

## 2022-08-10 RX ORDER — NALOXONE HCL 0.4 MG/ML
0.02 VIAL (ML) INJECTION
Status: CANCELLED | OUTPATIENT
Start: 2022-08-10

## 2022-08-10 RX ORDER — KETOROLAC TROMETHAMINE 30 MG/ML
15 INJECTION, SOLUTION INTRAMUSCULAR; INTRAVENOUS EVERY 6 HOURS PRN
Status: CANCELLED | OUTPATIENT
Start: 2022-08-10 | End: 2022-08-10

## 2022-08-10 RX ORDER — HYDROMORPHONE HYDROCHLORIDE 1 MG/ML
2 INJECTION, SOLUTION INTRAMUSCULAR; INTRAVENOUS; SUBCUTANEOUS EVERY 6 HOURS PRN
Status: CANCELLED | OUTPATIENT
Start: 2022-08-10

## 2022-08-10 RX ORDER — HYDROCODONE BITARTRATE AND ACETAMINOPHEN 5; 325 MG/1; MG/1
1 TABLET ORAL EVERY 4 HOURS PRN
Status: CANCELLED | OUTPATIENT
Start: 2022-08-10

## 2022-08-10 RX ORDER — ONDANSETRON 2 MG/ML
INJECTION INTRAMUSCULAR; INTRAVENOUS
Status: DISCONTINUED | OUTPATIENT
Start: 2022-08-10 | End: 2022-08-10

## 2022-08-10 RX ORDER — SODIUM CHLORIDE 9 MG/ML
INJECTION, SOLUTION INTRAVENOUS CONTINUOUS
Status: CANCELLED | OUTPATIENT
Start: 2022-08-10

## 2022-08-10 RX ORDER — IBUPROFEN 200 MG
16 TABLET ORAL
Status: CANCELLED | OUTPATIENT
Start: 2022-08-10

## 2022-08-10 RX ORDER — SODIUM CHLORIDE 0.9 % (FLUSH) 0.9 %
10 SYRINGE (ML) INJECTION EVERY 12 HOURS PRN
Status: CANCELLED | OUTPATIENT
Start: 2022-08-10

## 2022-08-10 RX ADMIN — LIDOCAINE HYDROCHLORIDE 60 MG: 20 INJECTION, SOLUTION EPIDURAL; INFILTRATION; INTRACAUDAL; PERINEURAL at 03:08

## 2022-08-10 RX ADMIN — SODIUM CHLORIDE, SODIUM LACTATE, POTASSIUM CHLORIDE, AND CALCIUM CHLORIDE: .6; .31; .03; .02 INJECTION, SOLUTION INTRAVENOUS at 01:08

## 2022-08-10 RX ADMIN — SENNOSIDES AND DOCUSATE SODIUM 1 TABLET: 50; 8.6 TABLET ORAL at 08:08

## 2022-08-10 RX ADMIN — HYDROMORPHONE HYDROCHLORIDE 2 MG: 1 INJECTION, SOLUTION INTRAMUSCULAR; INTRAVENOUS; SUBCUTANEOUS at 11:08

## 2022-08-10 RX ADMIN — MUPIROCIN 1 G: 20 OINTMENT TOPICAL at 08:08

## 2022-08-10 RX ADMIN — KETOROLAC TROMETHAMINE 15 MG: 30 INJECTION, SOLUTION INTRAMUSCULAR; INTRAVENOUS at 05:08

## 2022-08-10 RX ADMIN — PIPERACILLIN SODIUM AND TAZOBACTAM SODIUM 4.5 G: 4; .5 INJECTION, POWDER, LYOPHILIZED, FOR SOLUTION INTRAVENOUS at 05:08

## 2022-08-10 RX ADMIN — SODIUM CHLORIDE, SODIUM LACTATE, POTASSIUM CHLORIDE, AND CALCIUM CHLORIDE: .6; .31; .03; .02 INJECTION, SOLUTION INTRAVENOUS at 03:08

## 2022-08-10 RX ADMIN — HYDROCODONE BITARTRATE AND ACETAMINOPHEN 1 TABLET: 5; 325 TABLET ORAL at 07:08

## 2022-08-10 RX ADMIN — PROPOFOL 200 MG: 10 INJECTION, EMULSION INTRAVENOUS at 03:08

## 2022-08-10 RX ADMIN — DEXAMETHASONE SODIUM PHOSPHATE 8 MG: 4 INJECTION, SOLUTION INTRAMUSCULAR; INTRAVENOUS at 03:08

## 2022-08-10 RX ADMIN — LIDOCAINE HYDROCHLORIDE 4 ML: 20 JELLY TOPICAL at 03:08

## 2022-08-10 RX ADMIN — PIPERACILLIN SODIUM AND TAZOBACTAM SODIUM 4.5 G: 4; .5 INJECTION, POWDER, LYOPHILIZED, FOR SOLUTION INTRAVENOUS at 08:08

## 2022-08-10 RX ADMIN — HYDROMORPHONE HYDROCHLORIDE 2 MG: 1 INJECTION, SOLUTION INTRAMUSCULAR; INTRAVENOUS; SUBCUTANEOUS at 03:08

## 2022-08-10 RX ADMIN — HYDROMORPHONE HYDROCHLORIDE 2 MG: 1 INJECTION, SOLUTION INTRAMUSCULAR; INTRAVENOUS; SUBCUTANEOUS at 08:08

## 2022-08-10 RX ADMIN — MIDAZOLAM HYDROCHLORIDE 2 MG: 1 INJECTION, SOLUTION INTRAMUSCULAR; INTRAVENOUS at 03:08

## 2022-08-10 RX ADMIN — SODIUM CHLORIDE: 0.9 INJECTION, SOLUTION INTRAVENOUS at 07:08

## 2022-08-10 RX ADMIN — FAMOTIDINE 20 MG: 10 INJECTION, SOLUTION INTRAVENOUS at 03:08

## 2022-08-10 RX ADMIN — ONDANSETRON 4 MG: 2 INJECTION INTRAMUSCULAR; INTRAVENOUS at 03:08

## 2022-08-10 RX ADMIN — SODIUM CHLORIDE, SODIUM LACTATE, POTASSIUM CHLORIDE, AND CALCIUM CHLORIDE: .6; .31; .03; .02 INJECTION, SOLUTION INTRAVENOUS at 02:08

## 2022-08-10 RX ADMIN — FENTANYL CITRATE 100 MCG: 50 INJECTION INTRAMUSCULAR; INTRAVENOUS at 03:08

## 2022-08-10 RX ADMIN — SUCCINYLCHOLINE CHLORIDE 160 MG: 20 INJECTION, SOLUTION INTRAMUSCULAR; INTRAVENOUS at 03:08

## 2022-08-10 RX ADMIN — KETOROLAC TROMETHAMINE 15 MG: 30 INJECTION, SOLUTION INTRAMUSCULAR; INTRAVENOUS at 11:08

## 2022-08-10 NOTE — ASSESSMENT & PLAN NOTE
MRCP indicates choledocholithiasis.  ERCP today at [facility] with Dr. Jessica scheduled.  Labs improved from yesterday.

## 2022-08-10 NOTE — SUBJECTIVE & OBJECTIVE
"Interval History: NPO for procedure this afternoon at [facility].  Pt understands "come and go" procedure.  Pain better with IV narcotics.    Review of Systems   Constitutional:  Negative for appetite change, chills, fatigue and fever.   HENT:  Negative for sore throat and trouble swallowing.    Respiratory:  Negative for cough, shortness of breath and wheezing.    Cardiovascular:  Negative for chest pain and palpitations.   Gastrointestinal:  Positive for abdominal pain and nausea. Negative for diarrhea and vomiting.   Genitourinary:  Negative for dysuria and urgency.   Musculoskeletal:  Positive for back pain (chronic). Negative for arthralgias.   Skin:  Negative for pallor and rash.   Neurological:  Negative for weakness and light-headedness.   Psychiatric/Behavioral:  Positive for sleep disturbance. Negative for agitation and confusion.    All other systems reviewed and are negative.  Objective:     Vital Signs (Most Recent):  Temp: 98.8 °F (37.1 °C) (08/10/22 1129)  Pulse: 108 (08/10/22 1129)  Resp: 18 (08/10/22 1129)  BP: (!) 137/90 (08/10/22 1129)  SpO2: (!) 86 % (08/10/22 1129)   Vital Signs (24h Range):  Temp:  [97.5 °F (36.4 °C)-98.8 °F (37.1 °C)] 98.8 °F (37.1 °C)  Pulse:  [] 108  Resp:  [14-18] 18  SpO2:  [86 %-98 %] 86 %  BP: (112-151)/(71-90) 137/90     Weight: 88.5 kg (195 lb)  Body mass index is 30.54 kg/m².    Intake/Output Summary (Last 24 hours) at 8/10/2022 1154  Last data filed at 8/10/2022 0845  Gross per 24 hour   Intake 1552.59 ml   Output 250 ml   Net 1302.59 ml      Physical Exam  Constitutional:       General: She is not in acute distress.     Appearance: She is well-developed. She is not ill-appearing.   HENT:      Head: Normocephalic and atraumatic.      Mouth/Throat:      Mouth: Mucous membranes are dry.   Eyes:      Conjunctiva/sclera: Conjunctivae normal.      Pupils: Pupils are equal, round, and reactive to light.   Cardiovascular:      Rate and Rhythm: Normal rate and regular rhythm. "      Heart sounds: Normal heart sounds.   Pulmonary:      Effort: Pulmonary effort is normal. No respiratory distress.      Breath sounds: Normal breath sounds.   Abdominal:      General: Bowel sounds are normal. There is no distension.      Palpations: Abdomen is soft.      Tenderness: There is abdominal tenderness (generalized).   Musculoskeletal:         General: Normal range of motion.      Cervical back: Normal range of motion and neck supple.   Skin:     General: Skin is warm and dry.      Capillary Refill: Capillary refill takes 2 to 3 seconds.      Comments: Lap sites well approximated, clean and dry.   Neurological:      General: No focal deficit present.      Mental Status: She is alert and oriented to person, place, and time.   Psychiatric:         Mood and Affect: Mood normal.         Behavior: Behavior normal.         Thought Content: Thought content normal.         Judgment: Judgment normal.       Significant Labs: All pertinent labs within the past 24 hours have been reviewed.  CBC:   Recent Labs   Lab 08/09/22 0442 08/10/22  0442   WBC 20.12* 19.77*   HGB 13.3 13.3   HCT 41.2 41.3    365     CMP:   Recent Labs   Lab 08/09/22 0442 08/10/22  0442     139 139   K 3.6  3.5 3.6     107 108   CO2 22*  20* 20*   *  124* 94   BUN 8  7 15   CREATININE 0.8  0.8 0.7   CALCIUM 8.6*  8.9 8.5*   PROT 7.0  7.3 6.5   ALBUMIN 3.5  3.5 3.1*   BILITOT 2.4*  2.5* 1.5*   ALKPHOS 191*  201* 204*   *  288* 118*   *  432* 301*   ANIONGAP 9  12 11       Significant Imaging: I have reviewed all pertinent imaging results/findings within the past 24 hours.

## 2022-08-10 NOTE — ANESTHESIA POSTPROCEDURE EVALUATION
Anesthesia Post Evaluation    Patient: Mena Ovalle    Procedure(s) Performed: Procedure(s) (LRB):  ERCP (ENDOSCOPIC RETROGRADE CHOLANGIOPANCREATOGRAPHY) (N/A)    Final Anesthesia Type: general      Patient location during evaluation: PACU  Patient participation: Yes- Able to Participate  Level of consciousness: awake and alert and oriented  Post-procedure vital signs: reviewed and stable  Pain management: adequate  Airway patency: patent    PONV status at discharge: No PONV  Anesthetic complications: no      Cardiovascular status: blood pressure returned to baseline, hemodynamically stable and stable  Respiratory status: unassisted, spontaneous ventilation and nasal cannula  Hydration status: euvolemic  Follow-up not needed.          Vitals Value Taken Time   /81 08/10/22 1820   Temp 37 °C (98.6 °F) 08/10/22 1820   Pulse 104 08/10/22 1820   Resp 18 08/10/22 1820   SpO2 95 % 08/10/22 1820         No case tracking events are documented in the log.      Pain/Wei Score: Pain Rating Prior to Med Admin: 5 (8/10/2022 11:12 AM)  Pain Rating Post Med Admin: 2 (8/10/2022 12:00 PM)  Wei Score: 9 (8/10/2022  5:15 PM)

## 2022-08-10 NOTE — PROGRESS NOTES
"[facility] Medicine  Progress Note    Patient Name: Mena Ovalle  MRN: [id number]  Patient Class: IP- Inpatient   Admission Date: 8/7/2022  Length of Stay: 1 days  Attending Physician: Jodee Garrett MD  Primary Care Provider: Francisco Camargo III, MD        Subjective:     Principal Problem:Acute cholecystitis        HPI:  Mena Ovalle is a 27 y/o female with no significant past medical history who presented to the emergency department with complaints of right upper quadrant abdominal pain accompanied by nausea and vomiting which began earlier this morning.  She had a similar episode about a week ago which resolved.  Denies recent fever, chest pain, diarrhea, dysuria, or any other symptoms.  CT abdomen and pelvis concerning for acute cholecystitis.  She will be placed in observation under the service of hospital Medicine.  General surgery has been consulted and plan surgical procedure tomorrow.      Overview/Hospital Course:  No notes on file    Interval History: NPO for procedure this afternoon at [facility].  Pt understands "come and go" procedure.  Pain better with IV narcotics.    Review of Systems   Constitutional:  Negative for appetite change, chills, fatigue and fever.   HENT:  Negative for sore throat and trouble swallowing.    Respiratory:  Negative for cough, shortness of breath and wheezing.    Cardiovascular:  Negative for chest pain and palpitations.   Gastrointestinal:  Positive for abdominal pain and nausea. Negative for diarrhea and vomiting.   Genitourinary:  Negative for dysuria and urgency.   Musculoskeletal:  Positive for back pain (chronic). Negative for arthralgias.   Skin:  Negative for pallor and rash.   Neurological:  Negative for weakness and light-headedness.   Psychiatric/Behavioral:  Positive for sleep disturbance. Negative for agitation and confusion.    All other systems reviewed and are negative.  Objective:     Vital Signs (Most " Recent):  Temp: 98.8 °F (37.1 °C) (08/10/22 1129)  Pulse: 108 (08/10/22 1129)  Resp: 18 (08/10/22 1129)  BP: (!) 137/90 (08/10/22 1129)  SpO2: (!) 86 % (08/10/22 1129)   Vital Signs (24h Range):  Temp:  [97.5 °F (36.4 °C)-98.8 °F (37.1 °C)] 98.8 °F (37.1 °C)  Pulse:  [] 108  Resp:  [14-18] 18  SpO2:  [86 %-98 %] 86 %  BP: (112-151)/(71-90) 137/90     Weight: 88.5 kg (195 lb)  Body mass index is 30.54 kg/m².    Intake/Output Summary (Last 24 hours) at 8/10/2022 1154  Last data filed at 8/10/2022 0845  Gross per 24 hour   Intake 1552.59 ml   Output 250 ml   Net 1302.59 ml      Physical Exam  Constitutional:       General: She is not in acute distress.     Appearance: She is well-developed. She is not ill-appearing.   HENT:      Head: Normocephalic and atraumatic.      Mouth/Throat:      Mouth: Mucous membranes are dry.   Eyes:      Conjunctiva/sclera: Conjunctivae normal.      Pupils: Pupils are equal, round, and reactive to light.   Cardiovascular:      Rate and Rhythm: Normal rate and regular rhythm.      Heart sounds: Normal heart sounds.   Pulmonary:      Effort: Pulmonary effort is normal. No respiratory distress.      Breath sounds: Normal breath sounds.   Abdominal:      General: Bowel sounds are normal. There is no distension.      Palpations: Abdomen is soft.      Tenderness: There is abdominal tenderness (generalized).   Musculoskeletal:         General: Normal range of motion.      Cervical back: Normal range of motion and neck supple.   Skin:     General: Skin is warm and dry.      Capillary Refill: Capillary refill takes 2 to 3 seconds.      Comments: Lap sites well approximated, clean and dry.   Neurological:      General: No focal deficit present.      Mental Status: She is alert and oriented to person, place, and time.   Psychiatric:         Mood and Affect: Mood normal.         Behavior: Behavior normal.         Thought Content: Thought content normal.         Judgment: Judgment normal.        Significant Labs: All pertinent labs within the past 24 hours have been reviewed.  CBC:   Recent Labs   Lab 08/09/22  0442 08/10/22  0442   WBC 20.12* 19.77*   HGB 13.3 13.3   HCT 41.2 41.3    365     CMP:   Recent Labs   Lab 08/09/22  0442 08/10/22  0442     139 139   K 3.6  3.5 3.6     107 108   CO2 22*  20* 20*   *  124* 94   BUN 8  7 15   CREATININE 0.8  0.8 0.7   CALCIUM 8.6*  8.9 8.5*   PROT 7.0  7.3 6.5   ALBUMIN 3.5  3.5 3.1*   BILITOT 2.4*  2.5* 1.5*   ALKPHOS 191*  201* 204*   *  288* 118*   *  432* 301*   ANIONGAP 9  12 11       Significant Imaging: I have reviewed all pertinent imaging results/findings within the past 24 hours.      Assessment/Plan:      * Acute cholecystitis  POD 2 s/p laparoscopic cholecystectomy with Dr. Mendoza  Continue to follow recommendations of surgeon.  Needs aggressive incentive spirometry.  Follow hemoglobin and hematocrit closely.  Pain control.  Encourage OOB, ambulation      Choledocholithiasis with acute cholecystitis with obstruction  S/p lap cindy as above  ERCP today  Requiring IV narcotics for pain control  Continue [facility]      Hyperbilirubinemia  As above      Transaminitis  MRCP indicates choledocholithiasis.  ERCP today at [facility] with Dr. Jessica scheduled.  Labs improved from yesterday.        VTE Risk Mitigation (From admission, onward)         Ordered     IP VTE HIGH RISK PATIENT  Once         08/07/22 1646     Place sequential compression device  Until discontinued         08/07/22 1646     Reason for No Pharmacological VTE Prophylaxis  Once        Question:  Reasons:  Answer:  Patient is Ambulatory    08/07/22 1646                Discharge Planning   KRISTY: 8/10/2022     Code Status: Full Code   Is the patient medically ready for discharge?:     Reason for patient still in hospital (select all that apply): Patient trending condition, Laboratory test, Treatment and Consult recommendations  Discharge Plan  A: Home with family                  Estefania JACKIE Mora  Department of Hospital Medicine   [facility]

## 2022-08-10 NOTE — PROGRESS NOTES
[facility] Surgery  Progress Note    Subjective:     Interval History: Number little better this morning. ERCP today. Duct swept and stent placed. No leak appreciated.  Feeling better.     Post-Op Info:  Procedure(s) (LRB):  CHOLECYSTECTOMY, LAPAROSCOPIC (N/A)   2 Days Post-Op      Medications:  Continuous Infusions:   [MAR Hold - Suspended Admission] sodium chloride 0.9% 125 mL/hr at 08/09/22 2110     Scheduled Meds:   [MAR Hold - Suspended Admission] mupirocin  1 g Nasal BID    [MAR Hold - Suspended Admission] piperacillin-tazobactam (ZOSYN) IVPB  4.5 g Intravenous Q8H    [MAR Hold - Suspended Admission] senna-docusate 8.6-50 mg  1 tablet Oral BID     PRN Meds:[MAR Hold - Suspended Admission] acetaminophen, [MAR Hold - Suspended Admission] albuterol-ipratropium, [MAR Hold - Suspended Admission] aluminum-magnesium hydroxide-simethicone, [MAR Hold - Suspended Admission] dextrose 10%, [MAR Hold - Suspended Admission] dextrose 10%, [MAR Hold - Suspended Admission] glucagon (human recombinant), [MAR Hold - Suspended Admission] glucose, [MAR Hold - Suspended Admission] glucose, [MAR Hold - Suspended Admission] HYDROcodone-acetaminophen, [MAR Hold - Suspended Admission] HYDROmorphone, [MAR Hold - Suspended Admission] magnesium oxide, [MAR Hold - Suspended Admission] magnesium oxide, [MAR Hold - Suspended Admission] melatonin, [MAR Hold - Suspended Admission] naloxone, [MAR Hold - Suspended Admission] ondansetron, [MAR Hold - Suspended Admission] ondansetron, [MAR Hold - Suspended Admission] potassium bicarbonate, [MAR Hold - Suspended Admission] potassium bicarbonate, [MAR Hold - Suspended Admission] potassium bicarbonate, [MAR Hold - Suspended Admission] potassium, sodium phosphates, [MAR Hold - Suspended Admission] potassium, sodium phosphates, [MAR Hold - Suspended Admission] potassium, sodium phosphates, [MAR Hold - Suspended Admission] simethicone, [MAR Hold - Suspended Admission]  sodium chloride 0.9%     Objective:     Vital Signs (Most Recent):  Temp: 98.8 °F (37.1 °C) (08/10/22 1129)  Pulse: 108 (08/10/22 1129)  Resp: 18 (08/10/22 1129)  BP: (!) 137/90 (08/10/22 1129)  SpO2: (!) 86 % (08/10/22 1129) Vital Signs (24h Range):  Temp:  [97.5 °F (36.4 °C)-98.8 °F (37.1 °C)] 98.8 °F (37.1 °C)  Pulse:  [] 108  Resp:  [15-20] 18  SpO2:  [86 %-100 %] 96 %  BP: (112-141)/(71-91) 131/83       Intake/Output Summary (Last 24 hours) at 8/10/2022 1804  Last data filed at 8/10/2022 1557  Gross per 24 hour   Intake 5332.59 ml   Output 250 ml   Net 5082.59 ml       Physical Exam    Significant Labs:  CBC:   Recent Labs   Lab 08/10/22  0442   WBC 19.77*   RBC 4.83   HGB 13.3   HCT 41.3      MCV 86   MCH 27.5   MCHC 32.2     CMP:   Recent Labs   Lab 08/10/22  0442   GLU 94   CALCIUM 8.5*   ALBUMIN 3.1*   PROT 6.5      K 3.6   CO2 20*      BUN 15   CREATININE 0.7   ALKPHOS 204*   *   *   BILITOT 1.5*       Significant Diagnostics:  I have reviewed all pertinent imaging results/findings within the past 24 hours.    Assessment/Plan:     Active Diagnoses:    Diagnosis Date Noted POA    PRINCIPAL PROBLEM:  Acute cholecystitis [K81.0] 08/07/2022 Yes    Choledocholithiasis with acute cholecystitis with obstruction [K80.43] 08/10/2022 Yes    Hyperbilirubinemia [E80.6] 08/09/2022 Yes    Transaminitis [R74.01] 08/08/2022 Yes      Problems Resolved During this Admission:     -Feeling better after ERCP so far. She will transfer back to [facility]. If doing well, OK to RI from surgery standpoint tomorrow or Friday.     Virgil Mendoza III, MD  General Surgery  [facility]

## 2022-08-10 NOTE — CARE UPDATE
08/10/22 0751   Patient Assessment/Suction   Level of Consciousness (AVPU) alert   Respiratory Effort Normal   Expansion/Accessory Muscles/Retractions expansion symmetric   All Lung Fields Breath Sounds Anterior:   FREDA Breath Sounds clear   RUL Breath Sounds clear   Rhythm/Pattern, Respiratory pattern regular   Cough Frequency no cough   PRE-TX-O2   O2 Device (Oxygen Therapy) room air   Oxygen Concentration (%) 21   SpO2 96 %   Pulse Oximetry Type Intermittent   $ Pulse Oximetry - Multiple Charge Pulse Oximetry - Multiple   Pulse 80   Resp 15   Positioning HOB elevated 30 degrees   Aerosol Therapy   $ Aerosol Therapy Charges PRN treatment not required   Incentive Spirometer   $ Incentive Spirometer Charges done with encouragement;done independently per patient   Administration (IS) proper technique demonstrated;self-administered

## 2022-08-10 NOTE — NURSING TRANSFER
Nursing Transfer Note      8/10/2022     Reason patient is being transferred: [facility]    Transfer from [facility]  Room 306    Transfer via ambulance    Transfer with personal cellphone    Transported by [employer] sent: No    Any special needs or follow-up needed: to Endoscopy for ERCP (Dr. Mendoza)    Chart send with patient: No    Notified: transfer center/ Pt flow center    Pt transferred- Leave of Absence with planned return

## 2022-08-10 NOTE — ASSESSMENT & PLAN NOTE
POD 2 s/p laparoscopic cholecystectomy with Dr. Mendoza  Continue to follow recommendations of surgeon.  Needs aggressive incentive spirometry.  Follow hemoglobin and hematocrit closely.  Pain control.  Encourage OOB, ambulation

## 2022-08-10 NOTE — PLAN OF CARE
Patient progressing towards discharge.    Patient had no acute events noted. Dilaudid and Toradol given for pain overnight. Zofran given for nausea. Pt with stand by assist to bedside commode with voiding. NPO pending procedure this afternoon. IV fluids continue to right hand per MD order.   Discussed POC with patient  with verbalization of understanding.    Will continue to monitor.

## 2022-08-10 NOTE — TRANSFER OF CARE
Anesthesia Transfer of Care Note    Patient: Mena Ovalle    Procedure(s) Performed: Procedure(s) (LRB):  ERCP (ENDOSCOPIC RETROGRADE CHOLANGIOPANCREATOGRAPHY) (N/A)    Patient location: PACU    Anesthesia Type: general    Transport from OR: Transported from OR on room air with adequate spontaneous ventilation    Post pain: adequate analgesia    Post assessment: no apparent anesthetic complications    Post vital signs: stable    Level of consciousness: awake and alert    Nausea/Vomiting: no nausea/vomiting    Complications: none    Transfer of care protocol was followed      Last vitals:   Visit Vitals  BP (!) 138/91   Pulse 110   Resp 20   LMP 07/24/2022   SpO2 100%   Breastfeeding No

## 2022-08-11 PROCEDURE — 25000003 PHARM REV CODE 250: Performed by: NURSE PRACTITIONER

## 2022-08-11 PROCEDURE — 94799 UNLISTED PULMONARY SVC/PX: CPT

## 2022-08-11 PROCEDURE — 63600175 PHARM REV CODE 636 W HCPCS: Performed by: SURGERY

## 2022-08-11 PROCEDURE — 25000003 PHARM REV CODE 250: Performed by: INTERNAL MEDICINE

## 2022-08-11 PROCEDURE — 97530 THERAPEUTIC ACTIVITIES: CPT

## 2022-08-11 PROCEDURE — 12000002 HC ACUTE/MED SURGE SEMI-PRIVATE ROOM

## 2022-08-11 PROCEDURE — 25000003 PHARM REV CODE 250: Performed by: SURGERY

## 2022-08-11 PROCEDURE — 63600175 PHARM REV CODE 636 W HCPCS: Performed by: INTERNAL MEDICINE

## 2022-08-11 PROCEDURE — 99900035 HC TECH TIME PER 15 MIN (STAT)

## 2022-08-11 PROCEDURE — 97116 GAIT TRAINING THERAPY: CPT

## 2022-08-11 PROCEDURE — 97161 PT EVAL LOW COMPLEX 20 MIN: CPT

## 2022-08-11 PROCEDURE — 94761 N-INVAS EAR/PLS OXIMETRY MLT: CPT

## 2022-08-11 RX ORDER — HYDROCODONE BITARTRATE AND ACETAMINOPHEN 10; 325 MG/1; MG/1
1 TABLET ORAL EVERY 6 HOURS PRN
Status: DISCONTINUED | OUTPATIENT
Start: 2022-08-11 | End: 2022-08-12 | Stop reason: HOSPADM

## 2022-08-11 RX ORDER — DICYCLOMINE HYDROCHLORIDE 10 MG/1
10 CAPSULE ORAL 4 TIMES DAILY
Status: DISCONTINUED | OUTPATIENT
Start: 2022-08-11 | End: 2022-08-12 | Stop reason: HOSPADM

## 2022-08-11 RX ORDER — MORPHINE SULFATE 2 MG/ML
4 INJECTION, SOLUTION INTRAMUSCULAR; INTRAVENOUS EVERY 6 HOURS PRN
Status: DISCONTINUED | OUTPATIENT
Start: 2022-08-11 | End: 2022-08-12 | Stop reason: HOSPADM

## 2022-08-11 RX ADMIN — DICYCLOMINE HYDROCHLORIDE 10 MG: 10 CAPSULE ORAL at 05:08

## 2022-08-11 RX ADMIN — HYDROCODONE BITARTRATE AND ACETAMINOPHEN 1 TABLET: 10; 325 TABLET ORAL at 05:08

## 2022-08-11 RX ADMIN — HYDROMORPHONE HYDROCHLORIDE 2 MG: 1 INJECTION, SOLUTION INTRAMUSCULAR; INTRAVENOUS; SUBCUTANEOUS at 12:08

## 2022-08-11 RX ADMIN — MORPHINE SULFATE 4 MG: 2 INJECTION, SOLUTION INTRAMUSCULAR; INTRAVENOUS at 09:08

## 2022-08-11 RX ADMIN — HYDROMORPHONE HYDROCHLORIDE 2 MG: 1 INJECTION, SOLUTION INTRAMUSCULAR; INTRAVENOUS; SUBCUTANEOUS at 05:08

## 2022-08-11 RX ADMIN — SENNOSIDES AND DOCUSATE SODIUM 1 TABLET: 50; 8.6 TABLET ORAL at 08:08

## 2022-08-11 RX ADMIN — MUPIROCIN 1 G: 20 OINTMENT TOPICAL at 08:08

## 2022-08-11 RX ADMIN — DICYCLOMINE HYDROCHLORIDE 10 MG: 10 CAPSULE ORAL at 08:08

## 2022-08-11 RX ADMIN — HYDROCODONE BITARTRATE AND ACETAMINOPHEN 1 TABLET: 5; 325 TABLET ORAL at 04:08

## 2022-08-11 RX ADMIN — HYDROCODONE BITARTRATE AND ACETAMINOPHEN 1 TABLET: 5; 325 TABLET ORAL at 10:08

## 2022-08-11 RX ADMIN — PIPERACILLIN SODIUM AND TAZOBACTAM SODIUM 4.5 G: 4; .5 INJECTION, POWDER, LYOPHILIZED, FOR SOLUTION INTRAVENOUS at 08:08

## 2022-08-11 RX ADMIN — HYDROCODONE BITARTRATE AND ACETAMINOPHEN 1 TABLET: 10; 325 TABLET ORAL at 11:08

## 2022-08-11 RX ADMIN — DICYCLOMINE HYDROCHLORIDE 10 MG: 10 CAPSULE ORAL at 01:08

## 2022-08-11 RX ADMIN — SODIUM CHLORIDE: 0.9 INJECTION, SOLUTION INTRAVENOUS at 04:08

## 2022-08-11 RX ADMIN — PIPERACILLIN SODIUM AND TAZOBACTAM SODIUM 4.5 G: 4; .5 INJECTION, POWDER, LYOPHILIZED, FOR SOLUTION INTRAVENOUS at 12:08

## 2022-08-11 RX ADMIN — PIPERACILLIN SODIUM AND TAZOBACTAM SODIUM 4.5 G: 4; .5 INJECTION, POWDER, LYOPHILIZED, FOR SOLUTION INTRAVENOUS at 04:08

## 2022-08-11 NOTE — SUBJECTIVE & OBJECTIVE
Interval History: successful ERCP yesterday, no immediate complications.  Tolerating diet but poor appetite.  Still fair amount of pain.  Discussed OOB to chair, ambulation and pain control-need to progress to oral pain medication vs IV prior to discharge.  Plan dc home tomorrow.    Review of Systems   Constitutional:  Negative for appetite change, chills, fatigue and fever.   HENT:  Negative for sore throat and trouble swallowing.    Respiratory:  Negative for cough, shortness of breath and wheezing.    Cardiovascular:  Negative for chest pain and palpitations.   Gastrointestinal:  Positive for abdominal pain. Negative for diarrhea, nausea and vomiting.   Genitourinary:  Negative for dysuria and urgency.   Musculoskeletal:  Positive for back pain (chronic). Negative for arthralgias.   Skin:  Negative for pallor and rash.   Neurological:  Negative for weakness and light-headedness.   Psychiatric/Behavioral:  Positive for sleep disturbance. Negative for agitation and confusion.    All other systems reviewed and are negative.  Objective:     Vital Signs (Most Recent):  Temp: 98.3 °F (36.8 °C) (08/11/22 1128)  Pulse: (!) 122 (08/11/22 1149)  Resp: 18 (08/11/22 1212)  BP: (!) 140/92 (08/11/22 1128)  SpO2: 95 % (08/11/22 1149)   Vital Signs (24h Range):  Temp:  [97.6 °F (36.4 °C)-98.8 °F (37.1 °C)] 98.3 °F (36.8 °C)  Pulse:  [] 122  Resp:  [16-20] 18  SpO2:  [91 %-100 %] 95 %  BP: (124-141)/(76-92) 140/92     Weight: 88.5 kg (195 lb)  Body mass index is 30.54 kg/m².    Intake/Output Summary (Last 24 hours) at 8/11/2022 1220  Last data filed at 8/11/2022 0600  Gross per 24 hour   Intake 5181.92 ml   Output --   Net 5181.92 ml      Physical Exam  Constitutional:       General: She is not in acute distress.     Appearance: She is well-developed. She is not ill-appearing.   HENT:      Head: Normocephalic and atraumatic.      Mouth/Throat:      Pharynx: Oropharynx is clear.   Eyes:      Conjunctiva/sclera: Conjunctivae  normal.      Pupils: Pupils are equal, round, and reactive to light.   Cardiovascular:      Rate and Rhythm: Normal rate and regular rhythm.      Heart sounds: Normal heart sounds.   Pulmonary:      Effort: Pulmonary effort is normal. No respiratory distress.      Breath sounds: Normal breath sounds.   Abdominal:      General: Bowel sounds are normal. There is no distension.      Palpations: Abdomen is soft.      Tenderness: There is abdominal tenderness (expected post operative).   Musculoskeletal:         General: Normal range of motion.      Cervical back: Normal range of motion and neck supple.   Skin:     General: Skin is warm and dry.      Capillary Refill: Capillary refill takes 2 to 3 seconds.      Comments: Lap sites well approximated, clean and dry.   Neurological:      General: No focal deficit present.      Mental Status: She is alert and oriented to person, place, and time.   Psychiatric:         Mood and Affect: Mood normal.         Behavior: Behavior normal.         Thought Content: Thought content normal.         Judgment: Judgment normal.       Significant Labs: All pertinent labs within the past 24 hours have been reviewed.  CBC:   Recent Labs   Lab 08/10/22  0442   WBC 19.77*   HGB 13.3   HCT 41.3        CMP:   Recent Labs   Lab 08/10/22  0442      K 3.6      CO2 20*   GLU 94   BUN 15   CREATININE 0.7   CALCIUM 8.5*   PROT 6.5   ALBUMIN 3.1*   BILITOT 1.5*   ALKPHOS 204*   *   *   ANIONGAP 11       Significant Imaging: I have reviewed all pertinent imaging results/findings within the past 24 hours.

## 2022-08-11 NOTE — PROGRESS NOTES
[facility] Hospital Medicine  Progress Note    Patient Name: Mena Ovalle  MRN: [id number]  Patient Class: IP- Inpatient   Admission Date: 8/7/2022  Length of Stay: 2 days  Attending Physician: Jodee Garrett MD  Primary Care Provider: Francisco Camargo III, MD        Subjective:     Principal Problem:Acute cholecystitis        HPI:  Mena vOalle is a 25 y/o female with no significant past medical history who presented to the emergency department with complaints of right upper quadrant abdominal pain accompanied by nausea and vomiting which began earlier this morning.  She had a similar episode about a week ago which resolved.  Denies recent fever, chest pain, diarrhea, dysuria, or any other symptoms.  CT abdomen and pelvis concerning for acute cholecystitis.  She will be placed in observation under the service of hospital Medicine.  General surgery has been consulted and plan surgical procedure tomorrow.      Overview/Hospital Course:  No notes on file    Interval History: successful ERCP yesterday, no immediate complications.  Tolerating diet but poor appetite.  Still fair amount of pain.  Discussed OOB to chair, ambulation and pain control-need to progress to oral pain medication vs IV prior to discharge.  Plan dc home tomorrow.    Review of Systems   Constitutional:  Negative for appetite change, chills, fatigue and fever.   HENT:  Negative for sore throat and trouble swallowing.    Respiratory:  Negative for cough, shortness of breath and wheezing.    Cardiovascular:  Negative for chest pain and palpitations.   Gastrointestinal:  Positive for abdominal pain. Negative for diarrhea, nausea and vomiting.   Genitourinary:  Negative for dysuria and urgency.   Musculoskeletal:  Positive for back pain (chronic). Negative for arthralgias.   Skin:  Negative for pallor and rash.   Neurological:  Negative for weakness and light-headedness.   Psychiatric/Behavioral:  Positive for  sleep disturbance. Negative for agitation and confusion.    All other systems reviewed and are negative.  Objective:     Vital Signs (Most Recent):  Temp: 98.3 °F (36.8 °C) (08/11/22 1128)  Pulse: (!) 122 (08/11/22 1149)  Resp: 18 (08/11/22 1212)  BP: (!) 140/92 (08/11/22 1128)  SpO2: 95 % (08/11/22 1149)   Vital Signs (24h Range):  Temp:  [97.6 °F (36.4 °C)-98.8 °F (37.1 °C)] 98.3 °F (36.8 °C)  Pulse:  [] 122  Resp:  [16-20] 18  SpO2:  [91 %-100 %] 95 %  BP: (124-141)/(76-92) 140/92     Weight: 88.5 kg (195 lb)  Body mass index is 30.54 kg/m².    Intake/Output Summary (Last 24 hours) at 8/11/2022 1220  Last data filed at 8/11/2022 0600  Gross per 24 hour   Intake 5181.92 ml   Output --   Net 5181.92 ml      Physical Exam  Constitutional:       General: She is not in acute distress.     Appearance: She is well-developed. She is not ill-appearing.   HENT:      Head: Normocephalic and atraumatic.      Mouth/Throat:      Pharynx: Oropharynx is clear.   Eyes:      Conjunctiva/sclera: Conjunctivae normal.      Pupils: Pupils are equal, round, and reactive to light.   Cardiovascular:      Rate and Rhythm: Normal rate and regular rhythm.      Heart sounds: Normal heart sounds.   Pulmonary:      Effort: Pulmonary effort is normal. No respiratory distress.      Breath sounds: Normal breath sounds.   Abdominal:      General: Bowel sounds are normal. There is no distension.      Palpations: Abdomen is soft.      Tenderness: There is abdominal tenderness (expected post operative).   Musculoskeletal:         General: Normal range of motion.      Cervical back: Normal range of motion and neck supple.   Skin:     General: Skin is warm and dry.      Capillary Refill: Capillary refill takes 2 to 3 seconds.      Comments: Lap sites well approximated, clean and dry.   Neurological:      General: No focal deficit present.      Mental Status: She is alert and oriented to person, place, and time.   Psychiatric:         Mood and  Affect: Mood normal.         Behavior: Behavior normal.         Thought Content: Thought content normal.         Judgment: Judgment normal.       Significant Labs: All pertinent labs within the past 24 hours have been reviewed.  CBC:   Recent Labs   Lab 08/10/22  0442   WBC 19.77*   HGB 13.3   HCT 41.3        CMP:   Recent Labs   Lab 08/10/22  0442      K 3.6      CO2 20*   GLU 94   BUN 15   CREATININE 0.7   CALCIUM 8.5*   PROT 6.5   ALBUMIN 3.1*   BILITOT 1.5*   ALKPHOS 204*   *   *   ANIONGAP 11       Significant Imaging: I have reviewed all pertinent imaging results/findings within the past 24 hours.      Assessment/Plan:      * Acute cholecystitis  POD 3 s/p laparoscopic cholecystectomy with Dr. Mendoza  Underwent ERCP yesterday  Continue to follow recommendations of surgeon.  Needs aggressive incentive spirometry.  Follow hemoglobin and hematocrit closely.  Pain control.-still requiring IV narcotics.  Will increase norco dose, add bentyl.  Encourage OOB, ambulation      Choledocholithiasis with acute cholecystitis with obstruction  S/p lap cindy as above  ERCP yesterday  Requiring IV narcotics for pain control-discussed with pt, wean  Stop IVF-tolerating diet      Hyperbilirubinemia  As above-trending      Transaminitis  MRCP indicates choledocholithiasis.  ERCP yesterday at [facility] with Dr. Jessica scheduled.  Labs improved from yesterday.        VTE Risk Mitigation (From admission, onward)         Ordered     IP VTE HIGH RISK PATIENT  Once         08/07/22 1646     Place sequential compression device  Until discontinued         08/07/22 1646     Reason for No Pharmacological VTE Prophylaxis  Once        Question:  Reasons:  Answer:  Patient is Ambulatory    08/07/22 1646                Discharge Planning   KRISTY: 8/10/2022     Code Status: Prior   Is the patient medically ready for discharge?:     Reason for patient still in hospital (select all that apply): Patient trending  condition, Laboratory test and Treatment  Discharge Plan A: Home with family                  JACKIE Kyle  Department of Hospital Medicine   [facility]

## 2022-08-11 NOTE — PLAN OF CARE
Problem: Adult Inpatient Plan of Care  Goal: Plan of Care Review  Outcome: Ongoing, Progressing     Problem: Adult Inpatient Plan of Care  Goal: Optimal Comfort and Wellbeing  Outcome: Ongoing, Progressing     Problem: Fall Injury Risk  Goal: Absence of Fall and Fall-Related Injury  Outcome: Ongoing, Progressing     POC reviewed with patient. Ambulate to bathroom with assist. PRN medication given for abdomen pain. Incisions dry and intact with dermabond. Bruising noted around each lap site. Patient passing gas, but no bowel movement today. Patient educated on prn medication and notified to ambulate and move. PIV intact and saline lock. All safety/fall precautions maintained. Bed low, wheels locked, call light within reach. No reports of nausea or vomiting. Diet advanced to regular low fat and low cholesterol.

## 2022-08-11 NOTE — ASSESSMENT & PLAN NOTE
MRCP indicates choledocholithiasis.  ERCP yesterday at [facility] with Dr. Jessica scheduled.  Labs improved from yesterday.

## 2022-08-11 NOTE — PLAN OF CARE
Problem: Physical Therapy  Goal: Physical Therapy Goal  Description: Goals to be met by: 2022     Patient will increase functional independence with mobility by performin. Supine to sit with Stand-by Assistance  2. Sit to stand transfer with Stand-by Assistance  3. Bed to chair transfer with Stand-by Assistance using No Assistive Device  4. Gait  x 250x2 feet with Contact Guard Assistance using Rolling Walker.   5. Lower extremity exercise program x20 reps  Outcome: Ongoing, Progressing   PT eval and treat. Pt ambulated with RW 250ft with slow francisco. OOB chair. Pt used bathroom 2x with small BM

## 2022-08-11 NOTE — NURSING
Patient returned from [facility] post ERCP. Voided upon return priyanka colored urine in BSC. VS taken. No co verbalized. Belongings not in drawers reported to [place] [facility]A

## 2022-08-11 NOTE — ASSESSMENT & PLAN NOTE
POD 3 s/p laparoscopic cholecystectomy with Dr. Mendoza  Underwent ERCP yesterday  Continue to follow recommendations of surgeon.  Needs aggressive incentive spirometry.  Follow hemoglobin and hematocrit closely.  Pain control.-still requiring IV narcotics.  Will increase norco dose, add bentyl.  Encourage OOB, ambulation

## 2022-08-11 NOTE — PLAN OF CARE
[facility]  Discharge Reassessment    Primary Care Provider: Francisco Camargo III, MD    Expected Discharge Date: 8/12/2022      Per MDRs, patient not medically ready for discharge today possible tomorrow.  Patient had successful ERCP yesterday, no immediate complications.  Tolerating diet but poor appetite.  Still fair amount of pain.  Discussed OOB to chair, ambulation and pain control-need to progress to oral pain medication vs IV prior to discharge.  CM following for discharge planning needs.      Reassessment (most recent)     Discharge Reassessment - 08/11/22 1238        Discharge Reassessment    Assessment Type Discharge Planning Reassessment     Did the patient's condition or plan change since previous assessment? Yes     Discharge Plan discussed with: Patient     Communicated KRISTY with patient/caregiver Yes     Discharge Plan A Home     Discharge Plan B Home with family     DME Needed Upon Discharge  none     Discharge Barriers Identified None     Why the patient remains in the hospital Requires continued medical care

## 2022-08-11 NOTE — PT/OT/SLP EVAL
Physical Therapy Evaluation    Patient Name:  Mena Ovalle   MRN:  [id number]    Recommendations:     Discharge Recommendations:  home   Discharge Equipment Recommendations: none   Barriers to discharge: None    Assessment:     Mena Ovalle is a 26 y.o. female admitted with a medical diagnosis of Acute cholecystitis.  She presents with the following impairments/functional limitations:  weakness, impaired endurance, impaired functional mobility, gait instability, decreased lower extremity function, pain, impaired cardiopulmonary response to activity .    Pt seen supine in bed and agreeable to PT. Min assist for safe mobility- pt ambulated to bathroom to void with extra time, passing gas. Progressed to hallways 250ft min assist with RW. Pt stopped at bathroom again and had small BM. OOB chair. Spouse arrived while pt ambulating at hallways..    Rehab Prognosis: Good; patient would benefit from acute skilled PT services to address these deficits and reach maximum level of function.    Recent Surgery: Procedure(s) (LRB):  CHOLECYSTECTOMY, LAPAROSCOPIC (N/A) 3 Days Post-Op    Plan:     During this hospitalization, patient to be seen daily to address the identified rehab impairments via gait training, therapeutic activities, therapeutic exercises and progress toward the following goals:    · Plan of Care Expires:  08/30/22    Subjective     Chief Complaint: weakness  Patient/Family Comments/goals: none stated  Pain/Comfort:  · Pain Rating 1:  (not rated)  · Location 1: abdomen  · Pain Addressed 1: Reposition, Distraction, Cessation of Activity    Patients cultural, spiritual, Gnosticist conflicts given the current situation:      Living Environment:  Home with spouse and 3 dependent children  Prior to admission, patients level of function was ambulatory/indep, employed.  Equipment used at home: none.  DME owned (not currently used): none.  Upon discharge, patient will have assistance from  family.    Objective:     Communicated with nurse Baker prior to session.  Patient found HOB elevated with peripheral IV  upon PT entry to room.    General Precautions: Standard, fall   Orthopedic Precautions:N/A   Braces: N/A  Respiratory Status: Room air    Exams:  · Postural Exam:  Patient presented with the following abnormalities:    · -       Rounded shoulders  · -       Forward head  · -       BMI 30  · RLE ROM: WFL  · RLE Strength: WFL  · LLE ROM: WFL  · LLE Strength: WFL    Functional Mobility:  · Bed Mobility:     · Rolling Right: contact guard assistance  · Supine to Sit: minimum assistance  · Transfers:     · Sit to Stand:  minimum assistance with no AD  · Bed to Chair: minimum assistance with  rolling walker  using  Stand Pivot  · Toilet Transfer: minimum assistance with  rolling walker and grab bars  using  Stand Pivot  · Gait: 250ft with RW min assist with slow francisco    Therapeutic Activities and Exercises:   Patient was educated on the importance of OOB activity and functional mobility to negate negative effects of prolonged bed rest during hospitalization, safe transfers and ambulation, and D/C planning   Bathroom use 2x before and after ambulation- passing gas and small BM  OOB chair with spouse present    AM-PAC 6 CLICK MOBILITY  Total Score:17     Patient left up in chair with all lines intact, call button in reach and nurse Olivia notified.    GOALS:   Multidisciplinary Problems     Physical Therapy Goals        Problem: Physical Therapy    Goal Priority Disciplines Outcome Goal Variances Interventions   Physical Therapy Goal     PT, PT/OT Ongoing, Progressing     Description: Goals to be met by: 2022     Patient will increase functional independence with mobility by performin. Supine to sit with Stand-by Assistance  2. Sit to stand transfer with Stand-by Assistance  3. Bed to chair transfer with Stand-by Assistance using No Assistive Device  4. Gait  x 250x2 feet with Contact Guard  Assistance using Rolling Walker.   5. Lower extremity exercise program x20 reps                   History:     Past Medical History:   Diagnosis Date    Abnormal Pap smear of cervix     repeat pap    Bronchitis     COVID 01/18/2022    Family history of pulmonary embolism     MOTHER with MTHFR, PE found after knee replacement.     Iron deficiency anemia 5/17/2018    Pneumonia        Past Surgical History:   Procedure Laterality Date    LAPAROSCOPIC CHOLECYSTECTOMY N/A 8/8/2022    Procedure: CHOLECYSTECTOMY, LAPAROSCOPIC;  Surgeon: Virgil Mendoza III, MD;  Location: [facility];  Service: General;  Laterality: N/A;    WISDOM TOOTH EXTRACTION  11/15/2019       Time Tracking:     PT Received On: 08/11/22  PT Start Time: 1331     PT Stop Time: 1420  PT Total Time (min): 49 min     Billable Minutes: Evaluation 10, Gait Training 20 and Therapeutic Activity 19      08/11/2022

## 2022-08-11 NOTE — PLAN OF CARE
08/10/22 2030   Patient Assessment/Suction   Level of Consciousness (AVPU) alert   Respiratory Effort Normal;Unlabored   Expansion/Accessory Muscles/Retractions no retractions;no use of accessory muscles   All Lung Fields Breath Sounds clear;Anterior:;Lateral:   Rhythm/Pattern, Respiratory depth regular;pattern regular;unlabored   Cough Frequency no cough   PRE-TX-O2   O2 Device (Oxygen Therapy) nasal cannula  (found on 1L)   $ Is the patient on Low Flow Oxygen? Yes   Flow (L/min) 1  (decreased from 2L)   Pulse Oximetry Type Intermittent   $ Pulse Oximetry - Multiple Charge Pulse Oximetry - Multiple   Pulse 101   Resp 18   Aerosol Therapy   $ Aerosol Therapy Charges PRN treatment not required   Respiratory Treatment Status (SVN) PRN treatment not required   Incentive Spirometer   $ Incentive Spirometer Charges done with encouragement   Incentive Spirometer Predicted Level (mL) 1500   Administration (IS) proper technique demonstrated   Number of Repetitions (IS) 10   Level Incentive Spirometer (mL) 750   Patient Tolerance (IS) fair

## 2022-08-11 NOTE — PLAN OF CARE
Patient progressing towards discharge.    Patient had no acute events noted. Pt returned to facility post procedure. O2 decreased to 1L via NC with no s/s of distress. 4 lap sites with dermabond no drainage noted. Pt urinated without difficulty with stand by assist. Medicated with norco and dilaudid for pain during shift with relief. IV fluids continue per MD order. Pt safety maintained during shift.   Discussed POC with patient and family with verbalization of understanding.    Will continue to monitor.

## 2022-08-11 NOTE — ASSESSMENT & PLAN NOTE
S/p lap cindy as above  ERCP yesterday  Requiring IV narcotics for pain control-discussed with pt, wean  Stop IVF-tolerating diet

## 2022-08-11 NOTE — CARE UPDATE
08/11/22 0739   Patient Assessment/Suction   Level of Consciousness (AVPU) alert   Respiratory Effort Normal;Unlabored   All Lung Fields Breath Sounds Anterior:;clear   PRE-TX-O2   O2 Device (Oxygen Therapy) room air   SpO2 98 %  (after using IS)   Pulse Oximetry Type Intermittent   $ Pulse Oximetry - Multiple Charge Pulse Oximetry - Multiple   Pulse 91   Resp 16   Aerosol Therapy   $ Aerosol Therapy Charges PRN treatment not required

## 2022-08-12 ENCOUNTER — TELEPHONE (OUTPATIENT)
Dept: FAMILY MEDICINE | Facility: CLINIC | Age: 27
End: 2022-08-12
Payer: MEDICAID

## 2022-08-12 ENCOUNTER — TELEPHONE (OUTPATIENT)
Dept: SURGERY | Facility: CLINIC | Age: 27
End: 2022-08-12
Payer: MEDICAID

## 2022-08-12 VITALS
SYSTOLIC BLOOD PRESSURE: 127 MMHG | BODY MASS INDEX: 30.61 KG/M2 | DIASTOLIC BLOOD PRESSURE: 82 MMHG | OXYGEN SATURATION: 98 % | RESPIRATION RATE: 18 BRPM | WEIGHT: 195 LBS | HEART RATE: 102 BPM | TEMPERATURE: 98 F | HEIGHT: 67 IN

## 2022-08-12 LAB
ALBUMIN SERPL BCP-MCNC: 2.3 G/DL (ref 3.5–5.2)
ALP SERPL-CCNC: 106 U/L (ref 55–135)
ALT SERPL W/O P-5'-P-CCNC: 100 U/L (ref 10–44)
ANION GAP SERPL CALC-SCNC: 9 MMOL/L (ref 8–16)
AST SERPL-CCNC: 24 U/L (ref 10–40)
BASOPHILS # BLD AUTO: 0.05 K/UL (ref 0–0.2)
BASOPHILS NFR BLD: 0.2 % (ref 0–1.9)
BILIRUB SERPL-MCNC: 0.9 MG/DL (ref 0.1–1)
BUN SERPL-MCNC: 8 MG/DL (ref 6–20)
CALCIUM SERPL-MCNC: 8.2 MG/DL (ref 8.7–10.5)
CHLORIDE SERPL-SCNC: 101 MMOL/L (ref 95–110)
CO2 SERPL-SCNC: 24 MMOL/L (ref 23–29)
CREAT SERPL-MCNC: 0.7 MG/DL (ref 0.5–1.4)
DIFFERENTIAL METHOD: ABNORMAL
EOSINOPHIL # BLD AUTO: 0.1 K/UL (ref 0–0.5)
EOSINOPHIL NFR BLD: 0.3 % (ref 0–8)
ERYTHROCYTE [DISTWIDTH] IN BLOOD BY AUTOMATED COUNT: 14.4 % (ref 11.5–14.5)
EST. GFR  (NO RACE VARIABLE): >60 ML/MIN/1.73 M^2
GLUCOSE SERPL-MCNC: 127 MG/DL (ref 70–110)
HCT VFR BLD AUTO: 32.3 % (ref 37–48.5)
HGB BLD-MCNC: 10.5 G/DL (ref 12–16)
IMM GRANULOCYTES # BLD AUTO: 0.15 K/UL (ref 0–0.04)
IMM GRANULOCYTES NFR BLD AUTO: 0.7 % (ref 0–0.5)
LYMPHOCYTES # BLD AUTO: 1.3 K/UL (ref 1–4.8)
LYMPHOCYTES NFR BLD: 6 % (ref 18–48)
MCH RBC QN AUTO: 27.4 PG (ref 27–31)
MCHC RBC AUTO-ENTMCNC: 32.5 G/DL (ref 32–36)
MCV RBC AUTO: 84 FL (ref 82–98)
MONOCYTES # BLD AUTO: 1.5 K/UL (ref 0.3–1)
MONOCYTES NFR BLD: 7.1 % (ref 4–15)
NEUTROPHILS # BLD AUTO: 17.8 K/UL (ref 1.8–7.7)
NEUTROPHILS NFR BLD: 85.7 % (ref 38–73)
NRBC BLD-RTO: 0 /100 WBC
PLATELET # BLD AUTO: 257 K/UL (ref 150–450)
PMV BLD AUTO: 10.5 FL (ref 9.2–12.9)
POTASSIUM SERPL-SCNC: 3.2 MMOL/L (ref 3.5–5.1)
PROT SERPL-MCNC: 6 G/DL (ref 6–8.4)
RBC # BLD AUTO: 3.83 M/UL (ref 4–5.4)
SODIUM SERPL-SCNC: 134 MMOL/L (ref 136–145)
WBC # BLD AUTO: 20.77 K/UL (ref 3.9–12.7)

## 2022-08-12 PROCEDURE — 36415 COLL VENOUS BLD VENIPUNCTURE: CPT | Performed by: NURSE PRACTITIONER

## 2022-08-12 PROCEDURE — 85025 COMPLETE CBC W/AUTO DIFF WBC: CPT | Performed by: NURSE PRACTITIONER

## 2022-08-12 PROCEDURE — 94761 N-INVAS EAR/PLS OXIMETRY MLT: CPT

## 2022-08-12 PROCEDURE — 94799 UNLISTED PULMONARY SVC/PX: CPT

## 2022-08-12 PROCEDURE — 25000003 PHARM REV CODE 250: Performed by: INTERNAL MEDICINE

## 2022-08-12 PROCEDURE — 25000003 PHARM REV CODE 250: Performed by: SURGERY

## 2022-08-12 PROCEDURE — 63600175 PHARM REV CODE 636 W HCPCS: Performed by: INTERNAL MEDICINE

## 2022-08-12 PROCEDURE — 25000003 PHARM REV CODE 250: Performed by: NURSE PRACTITIONER

## 2022-08-12 PROCEDURE — 80053 COMPREHEN METABOLIC PANEL: CPT | Performed by: NURSE PRACTITIONER

## 2022-08-12 PROCEDURE — 97530 THERAPEUTIC ACTIVITIES: CPT

## 2022-08-12 PROCEDURE — 97116 GAIT TRAINING THERAPY: CPT

## 2022-08-12 PROCEDURE — 63600175 PHARM REV CODE 636 W HCPCS: Performed by: SURGERY

## 2022-08-12 RX ORDER — AMOXICILLIN AND CLAVULANATE POTASSIUM 875; 125 MG/1; MG/1
1 TABLET, FILM COATED ORAL EVERY 12 HOURS
Qty: 20 TABLET | Refills: 0 | Status: ON HOLD | OUTPATIENT
Start: 2022-08-12 | End: 2022-08-22 | Stop reason: HOSPADM

## 2022-08-12 RX ORDER — DICYCLOMINE HYDROCHLORIDE 10 MG/1
10 CAPSULE ORAL 4 TIMES DAILY PRN
Qty: 60 CAPSULE | Refills: 0 | Status: ON HOLD | OUTPATIENT
Start: 2022-08-12 | End: 2022-08-22 | Stop reason: HOSPADM

## 2022-08-12 RX ADMIN — HYDROCODONE BITARTRATE AND ACETAMINOPHEN 1 TABLET: 10; 325 TABLET ORAL at 05:08

## 2022-08-12 RX ADMIN — DICYCLOMINE HYDROCHLORIDE 10 MG: 10 CAPSULE ORAL at 08:08

## 2022-08-12 RX ADMIN — PIPERACILLIN SODIUM AND TAZOBACTAM SODIUM 4.5 G: 4; .5 INJECTION, POWDER, LYOPHILIZED, FOR SOLUTION INTRAVENOUS at 04:08

## 2022-08-12 RX ADMIN — HYDROCODONE BITARTRATE AND ACETAMINOPHEN 1 TABLET: 10; 325 TABLET ORAL at 12:08

## 2022-08-12 RX ADMIN — ACETAMINOPHEN 650 MG: 325 TABLET ORAL at 08:08

## 2022-08-12 RX ADMIN — DICYCLOMINE HYDROCHLORIDE 10 MG: 10 CAPSULE ORAL at 12:08

## 2022-08-12 RX ADMIN — MORPHINE SULFATE 4 MG: 2 INJECTION, SOLUTION INTRAMUSCULAR; INTRAVENOUS at 04:08

## 2022-08-12 RX ADMIN — SENNOSIDES AND DOCUSATE SODIUM 1 TABLET: 50; 8.6 TABLET ORAL at 08:08

## 2022-08-12 RX ADMIN — MUPIROCIN 1 G: 20 OINTMENT TOPICAL at 08:08

## 2022-08-12 NOTE — TELEPHONE ENCOUNTER
----- Message from Jazzmine Ruiz, Patient Care Assistant sent at 8/12/2022 12:23 PM CDT -----  Regarding: appointment  Contact: shireen with NS  Type:  Sooner Appointment Request    Caller is requesting a sooner appointment.  Caller declined first available appointment listed below.  Caller will not accept being placed on the waitlist and is requesting a message be sent to doctor.    Name of Caller:  shireen with NS  When is the first available appointment?  2022  Symptoms:  hospital f/u   Best Call Back Number:  [phone] (home) [phone] (work)    Additional Information:  please call pt to advise. Thanks!

## 2022-08-12 NOTE — PT/OT/SLP PROGRESS
Physical Therapy Treatment    Patient Name:  Mena Ovalle   MRN:  [id number]    Recommendations:     Discharge Recommendations:  home   Discharge Equipment Recommendations: none   Barriers to discharge: None    Assessment:     Mena Ovalle is a 26 y.o. female admitted with a medical diagnosis of Acute cholecystitis.  She presents with the following impairments/functional limitations:  weakness, impaired endurance, impaired functional mobility, gait instability, decreased lower extremity function, pain, impaired cardiopulmonary response to activity .    Pt slow to mobilize- min assist to sit EOB- ambulated with RW 250ft with CGA slow and guarded francisco. To bathroom to void and donned street clothes min assist. Pt anticipating discharging home. OOB chair.    Rehab Prognosis: Good; patient would benefit from acute skilled PT services to address these deficits and reach maximum level of function.    Recent Surgery: Procedure(s) (LRB):  CHOLECYSTECTOMY, LAPAROSCOPIC (N/A) 4 Days Post-Op    Plan:     During this hospitalization, patient to be seen daily to address the identified rehab impairments via gait training, therapeutic activities, therapeutic exercises and progress toward the following goals:    · Plan of Care Expires:  08/30/22    Subjective     Chief Complaint: weakness- not sleeping well  Patient/Family Comments/goals: none sttaed  Pain/Comfort:  · Pain Rating 1: 0/10      Objective:     Communicated with nurse Sutton prior to session.  Patient found HOB elevated with peripheral IV upon PT entry to room.     General Precautions: Standard, fall   Orthopedic Precautions:N/A   Braces:    Respiratory Status: Room air     Functional Mobility:  · Bed Mobility:     · Rolling Right: minimum assistance  · Supine to Sit: minimum assistance  · Transfers:     · Sit to Stand:  contact guard assistance with hand-held assist  · Bed to Chair: contact guard assistance with  rolling walker  using  Stand  "Pivot  · Toilet Transfer: contact guard assistance with  rolling walker  using  Stand Pivot  · Gait: 250ft with RW CGA, slow and guarded francisco      AM-PAC 6 CLICK MOBILITY          Therapeutic Activities and Exercises:   Patient was educated on the importance of OOB activity and functional mobility to negate negative effects of prolonged bed rest during hospitalization, safe transfers and ambulation, and D/C planning   Bathroom use to void with pink urine- stated "probably starting period"- pt provided sanitary pad. Pt donned shirt/underwear and stretch pants with min assist  OOB chair post PT    Patient left up in chair with all lines intact and call button in reach..    GOALS:   Multidisciplinary Problems     Physical Therapy Goals        Problem: Physical Therapy    Goal Priority Disciplines Outcome Goal Variances Interventions   Physical Therapy Goal     PT, PT/OT Ongoing, Progressing     Description: Goals to be met by: 2022     Patient will increase functional independence with mobility by performin. Supine to sit with Stand-by Assistance  2. Sit to stand transfer with Stand-by Assistance  3. Bed to chair transfer with Stand-by Assistance using No Assistive Device  4. Gait  x 250x2 feet with Contact Guard Assistance using Rolling Walker.   5. Lower extremity exercise program x20 reps                   Time Tracking:     PT Received On: 22  PT Start Time: 1044     PT Stop Time: 1113  PT Total Time (min): 29 min     Billable Minutes: Gait Training 19 and Therapeutic Activity 10    Treatment Type: Treatment  PT/PTA: PT     PTA Visit Number: 0     2022  "

## 2022-08-12 NOTE — CONSULTS
[facility]  Hospital Medicine  Telemedicine Consult Note           Virtual [facility] Medicine consulted for Mena Ovalle to be followed through telemedicine modalities.  The patient is currently not appropriate for virtual visits of plan is to DC home tomorrow.  Will keep on current team for continuity of care.  Please re-consult once the patient is appropriate for virtual visits.      Breonna Yoon MD  Hospital Medicine Staff

## 2022-08-12 NOTE — PLAN OF CARE
Problem: Physical Therapy  Goal: Physical Therapy Goal  Description: Goals to be met by: 2022     Patient will increase functional independence with mobility by performin. Supine to sit with Stand-by Assistance  2. Sit to stand transfer with Stand-by Assistance  3. Bed to chair transfer with Stand-by Assistance using No Assistive Device  4. Gait  x 250x2 feet with Contact Guard Assistance using Rolling Walker.   5. Lower extremity exercise program x20 reps  Outcome: Ongoing, Progressing   Gait 250ft with RW with slow francisco. OOB chair

## 2022-08-12 NOTE — PLAN OF CARE
Care plan met - adequate for discharge.    Problem: Adult Inpatient Plan of Care  Goal: Plan of Care Review  Outcome: Met  Goal: Patient-Specific Goal (Individualized)  Outcome: Met  Goal: Absence of Hospital-Acquired Illness or Injury  Outcome: Met  Goal: Optimal Comfort and Wellbeing  Outcome: Met  Goal: Readiness for Transition of Care  Outcome: Met     Problem: Fall Injury Risk  Goal: Absence of Fall and Fall-Related Injury  Outcome: Met

## 2022-08-12 NOTE — PLAN OF CARE
08/11/22 2001   Patient Assessment/Suction   Level of Consciousness (AVPU) alert   Respiratory Effort Normal;Unlabored   Expansion/Accessory Muscles/Retractions no retractions;no use of accessory muscles   All Lung Fields Breath Sounds clear;Anterior:;Lateral:   Rhythm/Pattern, Respiratory depth regular;pattern regular;unlabored   Cough Frequency no cough   PRE-TX-O2   O2 Device (Oxygen Therapy) room air   SpO2 96 %   Pulse Oximetry Type Intermittent   $ Pulse Oximetry - Multiple Charge Pulse Oximetry - Multiple   Pulse (!) 125   Resp 18   Aerosol Therapy   $ Aerosol Therapy Charges PRN treatment not required   Respiratory Treatment Status (SVN) PRN treatment not required   Incentive Spirometer   $ Incentive Spirometer Charges done with encouragement   Incentive Spirometer Predicted Level (mL) 1500   Administration (IS) instruction provided, follow-up   Number of Repetitions (IS) 10   Level Incentive Spirometer (mL) 500   Patient Tolerance (IS) fair

## 2022-08-12 NOTE — PLAN OF CARE
Patient progressing towards discharge.    Patient had no acute events noted. Patient requiring Morphine and Hydrocodone for pain control overnight.  Patient ambulatory with stand by assistance to bathroom. Patient had one small bowel movement during the day.  Patient voiding overnight.  Patient tolerating diet and advanced to regular diet.  Discussed POC with patient and family with verbalization of understanding.    Will continue to monitor.

## 2022-08-12 NOTE — TELEPHONE ENCOUNTER
----- Message from Jazzmine Ruiz, Patient Care Assistant sent at 8/12/2022 12:17 PM CDT -----  Contact: shireen with NS  Type:  Sooner Appointment Request    Caller is requesting a sooner appointment.  Caller declined first available appointment listed below.  Caller will not accept being placed on the waitlist and is requesting a message be sent to doctor.    Name of Caller:shireen with NS   When is the first available appointment?  2022  Symptoms:  hospital f/u   Best Call Back Number:  [phone] (home) [phone] (work)    Additional Information:  please call shireen with NS  to advise. Thanks!

## 2022-08-12 NOTE — PLAN OF CARE
MATIAS sent secure message to Richi JACQUES with [facility] to review for rolling walker.       08/12/22 1218   Post-Acute Status   Post-Acute Authorization E   E Status Referrals Sent

## 2022-08-13 NOTE — PLAN OF CARE
Patient cleared for discharge from case management standpoint.       08/12/22 1235   Final Note   Assessment Type Final Discharge Note   Anticipated Discharge Disposition Home   Hospital Resources/Appts/Education Provided Appointments scheduled and added to AVS;Provided patient/caregiver with written discharge plan information;Provided education on problems/symptoms using teachback;Post-Acute resouces added to AVS

## 2022-08-13 NOTE — PLAN OF CARE
Ok to pull rolling walker from [facility] closet per Richi JACQUES with [facility].  SW delivered listed equipment to patient's hospital room.  Patient signed delivery ticket.       08/12/22 1230   Post-Acute Status   Post-Acute Authorization HME   HME Status Set-up Complete/Auth obtained

## 2022-08-14 NOTE — HOSPITAL COURSE
"Mena Ovalle was monitored closely during her hospital stay.  General surgery was consulted.  Her pain was controlled with IV and po narcotics.  IV zosyn was initiated.  On 8/8/22 she underwent a laparoscopic cholecystectomy with Dr. Mendoza.  On 8/9/22, pt reported no pain relief, and in fact, her abdominal pain had become worse.  Her LFT's were also noted to be elevated.  An MRCP was ordered by the surgeon.  Findings were significant for biliary duct dilatation and findings suspicious for choledocholithiasis with small distal common duct stone.  The surgeon spoke with Dr. Jessica at [facility] who agreed to perform an ERCP.  There was no bed availability at [facility], so a "come and go" procedure was arranged.  On 8/10, she underwent an ERCP with Dr. Jessica at [facility] in which a sphincterotomy was performed with one biliary stent being placed in the common hepatic duct. She was returned to [facility] once cleared by anesthesia.  She was placed on a clear liquid diet which was advanced as tolerated.  Her pain was controlled with oral narcotics.  Her liver enzymes were improving.  She was cleared for discharge from the standpoint of general surgery.  Discharge instructions were reviewed and questions answered.  Appropriate follow up was arranged.  She was discharged home in good condition.  "

## 2022-08-14 NOTE — DISCHARGE SUMMARY
"[facility] Medicine  Discharge Summary      Patient Name: Mena Ovalle  MRN: [id number]  Patient Class: IP- Inpatient  Admission Date: 8/7/2022  Hospital Length of Stay: 3 days  Discharge Date and Time: 8/12/2022  1:11 PM  Attending Physician: No att. providers found   Discharging Provider: JACKIE Kyle  Primary Care Provider: Francisco Camargo III, MD      HPI:   Mena Ovalle is a 25 y/o female with no significant past medical history who presented to the emergency department with complaints of right upper quadrant abdominal pain accompanied by nausea and vomiting which began earlier this morning.  She had a similar episode about a week ago which resolved.  Denies recent fever, chest pain, diarrhea, dysuria, or any other symptoms.  CT abdomen and pelvis concerning for acute cholecystitis.  She will be placed in observation under the service of hospital Medicine.  General surgery has been consulted and plan surgical procedure tomorrow.      Procedure(s) (LRB):  CHOLECYSTECTOMY, LAPAROSCOPIC (N/A)      Hospital Course:   Mena Ovalle was monitored closely during her hospital stay.  General surgery was consulted.  Her pain was controlled with IV and po narcotics.  IV zosyn was initiated.  On 8/8/22 she underwent a laparoscopic cholecystectomy with Dr. Mendoza.  On 8/9/22, pt reported no pain relief, and in fact, her abdominal pain had become worse.  Her LFT's were also noted to be elevated.  An MRCP was ordered by the surgeon.  Findings were significant for biliary duct dilatation and findings suspicious for choledocholithiasis with small distal common duct stone.  The surgeon spoke with Dr. Jessica at [facility] who agreed to perform an ERCP.  There was no bed availability at [facility], so a "come and go" procedure was arranged.  On 8/10, she underwent an ERCP with Dr. Jessica at [facility] in which a sphincterotomy was performed with one biliary stent being " placed in the common hepatic duct. She was returned to [facility] once cleared by anesthesia.  She was placed on a clear liquid diet which was advanced as tolerated.  Her pain was controlled with oral narcotics.  Her liver enzymes were improving.  She was cleared for discharge from the standpoint of general surgery.  Discharge instructions were reviewed and questions answered.  Appropriate follow up was arranged.  She was discharged home in good condition.       Goals of Care Treatment Preferences:  Code Status: Full Code      Consults:   Consults (From admission, onward)        Status Ordering Provider     Inpatient virtual consult to Hospital Medicine  Once        Provider:  (Not yet assigned)    FILOMENA Carrasco          No new Assessment & Plan notes have been filed under this hospital service since the last note was generated.  Service: Hospital Medicine    Final Active Diagnoses:    Diagnosis Date Noted POA    PRINCIPAL PROBLEM:  Acute cholecystitis [K81.0] 08/07/2022 Yes    Choledocholithiasis with acute cholecystitis with obstruction [K80.43] 08/10/2022 Yes    Hyperbilirubinemia [E80.6] 08/09/2022 Yes    Transaminitis [R74.01] 08/08/2022 Yes      Problems Resolved During this Admission:       Discharged Condition: good    Disposition: Home or Self Care    Follow Up:   Follow-up Information     Virgil Mendoza III, MD Follow up.    Specialties: General Surgery, Surgery  Why: office to contact patient to schedule hospital follow up  Contact information:  [address]  [place] [address]  [phone]             Ruddy Jessica III, MD Follow up in 2 month(s).    Specialty: Gastroenterology  Why: office to contact patient to schedule hospital follow up  Contact information:  [address]  [place] [address] [phone]             Francisco Camargo III, MD Follow up in 1 week(s).    Specialty: Family Medicine  Why: office to contact patient to schedule  "hospital follow up  Contact information:  [address] [facility]  [address]  [place] [facility] [address]  [phone]             [facility].    Specialty: DME Provider  Why: DME- rolling walker  Contact information:  [address]  [address]  [facility] [address]  [phone]                       Patient Instructions:      WALKER FOR HOME USE     Order Specific Question Answer Comments   Type of Walker: Adult (5'4"-6'6")    With wheels? Yes    Height: 5' 7" (1.702 m)    Weight: 88.5 kg (195 lb)    Length of need (1-99 months): 99    Does patient have medical equipment at home? none    Please check all that apply: Patient is unable to safely ambulate without equipment.      CBC auto differential   Standing Status: Future Standing Exp. Date: 10/11/23     Comprehensive metabolic panel   Standing Status: Future Standing Exp. Date: 10/11/23     Diet Adult Regular     Diet Adult Regular     Notify your health care provider if you experience any of the following:  temperature >100.4     Notify your health care provider if you experience any of the following:  persistent nausea and vomiting or diarrhea     Notify your health care provider if you experience any of the following:  severe uncontrolled pain     Notify your health care provider if you experience any of the following:  redness, tenderness, or signs of infection (pain, swelling, redness, odor or green/yellow discharge around incision site)     Notify your health care provider if you experience any of the following:  difficulty breathing or increased cough     Notify your health care provider if you experience any of the following:  persistent dizziness, light-headedness, or visual disturbances     Notify your health care provider if you experience any of the following:  increased confusion or weakness     Notify your health care provider if you experience any of the following:  severe persistent headache     Notify your health care provider if you experience any " of the following:  temperature >100.4     Notify your health care provider if you experience any of the following:  persistent nausea and vomiting or diarrhea     Notify your health care provider if you experience any of the following:  severe uncontrolled pain     Notify your health care provider if you experience any of the following:  redness, tenderness, or signs of infection (pain, swelling, redness, odor or green/yellow discharge around incision site)     Notify your health care provider if you experience any of the following:  difficulty breathing or increased cough     Notify your health care provider if you experience any of the following:  severe persistent headache     Notify your health care provider if you experience any of the following:  persistent dizziness, light-headedness, or visual disturbances     Notify your health care provider if you experience any of the following:  increased confusion or weakness     Lifting restrictions   Order Comments: No lifting >25 lbs until f/u with Dr. Mendoza       Significant Diagnostic Studies: Labs: All labs within the past 24 hours have been reviewed    Pending Diagnostic Studies:     Procedure Component Value Units Date/Time    Specimen to Pathology, Surgery General Surgery [[id number]] Collected: 08/08/22 1116    Order Status: Sent Lab Status: In process Updated: 08/08/22 1254    Specimen: Tissue          Medications:  Reconciled Home Medications:      Medication List      START taking these medications    amoxicillin-clavulanate 875-125mg 875-125 mg per tablet  Commonly known as: AUGMENTIN  Take 1 tablet by mouth every 12 (twelve) hours. Take with food for 10 days     dicyclomine 10 MG capsule  Commonly known as: BENTYL  Take 1 capsule (10 mg total) by mouth 4 (four) times daily as needed (abdominal pain).     HYDROcodone-acetaminophen 5-325 mg per tablet  Commonly known as: NORCO  Take 1 tablet by mouth every 6 (six) hours as needed for Pain.     ondansetron  4 MG Tbdl  Commonly known as: ZOFRAN-ODT  Take 2 tablets (8 mg total) by mouth every 8 (eight) hours as needed (nausea/vomiting).        CONTINUE taking these medications    gabapentin 100 MG capsule  Commonly known as: NEURONTIN  Take 100 mg by mouth 2 (two) times daily. Pt takes only at HS     traMADoL 50 mg tablet  Commonly known as: ULTRAM  Take 1 tablet (50 mg total) by mouth every 6 (six) hours as needed for Pain.            Indwelling Lines/Drains at time of discharge:   Lines/Drains/Airways     Epidural Line  Duration                Epidural Catheter 02/17/22 177 days                Time spent on the discharge of patient: 38 minutes         JACKIE Kyle  Department of Hospital Medicine  [facility]

## 2022-08-15 ENCOUNTER — TELEPHONE (OUTPATIENT)
Dept: FAMILY MEDICINE | Facility: CLINIC | Age: 27
End: 2022-08-15
Payer: MEDICAID

## 2022-08-15 ENCOUNTER — TELEPHONE (OUTPATIENT)
Dept: MEDSURG UNIT | Facility: HOSPITAL | Age: 27
End: 2022-08-15
Payer: MEDICAID

## 2022-08-15 LAB
FINAL PATHOLOGIC DIAGNOSIS: NORMAL
GROSS: NORMAL
Lab: NORMAL
MICROSCOPIC EXAM: NORMAL

## 2022-08-16 ENCOUNTER — HOSPITAL ENCOUNTER (INPATIENT)
Facility: HOSPITAL | Age: 27
LOS: 6 days | Discharge: HOME | DRG: 393 | End: 2022-08-22
Attending: EMERGENCY MEDICINE | Admitting: HOSPITALIST
Payer: MEDICAID

## 2022-08-16 ENCOUNTER — OFFICE VISIT (OUTPATIENT)
Dept: FAMILY MEDICINE | Facility: CLINIC | Age: 27
End: 2022-08-16
Payer: MEDICAID

## 2022-08-16 VITALS
HEIGHT: 67 IN | TEMPERATURE: 101 F | SYSTOLIC BLOOD PRESSURE: 112 MMHG | WEIGHT: 204.13 LBS | OXYGEN SATURATION: 98 % | HEART RATE: 99 BPM | BODY MASS INDEX: 32.04 KG/M2 | DIASTOLIC BLOOD PRESSURE: 78 MMHG

## 2022-08-16 DIAGNOSIS — R10.84 GENERALIZED ABDOMINAL PAIN: ICD-10-CM

## 2022-08-16 DIAGNOSIS — K85.80 OTHER ACUTE PANCREATITIS WITHOUT INFECTION OR NECROSIS: Primary | ICD-10-CM

## 2022-08-16 DIAGNOSIS — R50.9 FEVER, UNSPECIFIED FEVER CAUSE: ICD-10-CM

## 2022-08-16 DIAGNOSIS — A41.9 SEVERE SEPSIS: ICD-10-CM

## 2022-08-16 DIAGNOSIS — Z09 HOSPITAL DISCHARGE FOLLOW-UP: Primary | ICD-10-CM

## 2022-08-16 DIAGNOSIS — R65.20 SEVERE SEPSIS: ICD-10-CM

## 2022-08-16 DIAGNOSIS — R00.0 TACHYCARDIA: ICD-10-CM

## 2022-08-16 DIAGNOSIS — R14.0 ABDOMINAL DISTENSION: ICD-10-CM

## 2022-08-16 PROBLEM — K85.90 ACUTE PANCREATITIS: Status: ACTIVE | Noted: 2022-08-16

## 2022-08-16 PROBLEM — T81.40XA POST OP INFECTION: Status: ACTIVE | Noted: 2022-08-16

## 2022-08-16 LAB
ALBUMIN SERPL BCP-MCNC: 2.8 G/DL (ref 3.5–5.2)
ALP SERPL-CCNC: 274 U/L (ref 55–135)
ALT SERPL W/O P-5'-P-CCNC: 121 U/L (ref 10–44)
ANION GAP SERPL CALC-SCNC: 17 MMOL/L (ref 8–16)
AST SERPL-CCNC: 94 U/L (ref 10–40)
B-HCG UR QL: NEGATIVE
BACTERIA #/AREA URNS HPF: ABNORMAL /HPF
BASOPHILS # BLD AUTO: 0.07 K/UL (ref 0–0.2)
BASOPHILS NFR BLD: 0.4 % (ref 0–1.9)
BILIRUB SERPL-MCNC: 0.9 MG/DL (ref 0.1–1)
BILIRUB UR QL STRIP: ABNORMAL
BUN SERPL-MCNC: 8 MG/DL (ref 6–20)
CALCIUM SERPL-MCNC: 9.2 MG/DL (ref 8.7–10.5)
CHLORIDE SERPL-SCNC: 96 MMOL/L (ref 95–110)
CLARITY UR: ABNORMAL
CO2 SERPL-SCNC: 25 MMOL/L (ref 23–29)
COLOR UR: ABNORMAL
CREAT SERPL-MCNC: 0.7 MG/DL (ref 0.5–1.4)
DIFFERENTIAL METHOD: ABNORMAL
EOSINOPHIL # BLD AUTO: 0.1 K/UL (ref 0–0.5)
EOSINOPHIL NFR BLD: 0.6 % (ref 0–8)
ERYTHROCYTE [DISTWIDTH] IN BLOOD BY AUTOMATED COUNT: 14.4 % (ref 11.5–14.5)
EST. GFR  (NO RACE VARIABLE): >60 ML/MIN/1.73 M^2
GLUCOSE SERPL-MCNC: 98 MG/DL (ref 70–110)
GLUCOSE UR QL STRIP: NEGATIVE
HCT VFR BLD AUTO: 33 % (ref 37–48.5)
HGB BLD-MCNC: 10.8 G/DL (ref 12–16)
HGB UR QL STRIP: ABNORMAL
HYALINE CASTS #/AREA URNS LPF: 0 /LPF
IMM GRANULOCYTES # BLD AUTO: 0.19 K/UL (ref 0–0.04)
IMM GRANULOCYTES NFR BLD AUTO: 1.1 % (ref 0–0.5)
KETONES UR QL STRIP: NEGATIVE
LACTATE SERPL-SCNC: 1.3 MMOL/L (ref 0.5–2.2)
LACTATE SERPL-SCNC: 1.3 MMOL/L (ref 0.5–2.2)
LEUKOCYTE ESTERASE UR QL STRIP: NEGATIVE
LIPASE SERPL-CCNC: 118 U/L (ref 4–60)
LYMPHOCYTES # BLD AUTO: 2.1 K/UL (ref 1–4.8)
LYMPHOCYTES NFR BLD: 12.2 % (ref 18–48)
MCH RBC QN AUTO: 26.9 PG (ref 27–31)
MCHC RBC AUTO-ENTMCNC: 32.7 G/DL (ref 32–36)
MCV RBC AUTO: 82 FL (ref 82–98)
MICROSCOPIC COMMENT: ABNORMAL
MONOCYTES # BLD AUTO: 1.3 K/UL (ref 0.3–1)
MONOCYTES NFR BLD: 7.8 % (ref 4–15)
NEUTROPHILS # BLD AUTO: 13.3 K/UL (ref 1.8–7.7)
NEUTROPHILS NFR BLD: 77.9 % (ref 38–73)
NITRITE UR QL STRIP: NEGATIVE
NRBC BLD-RTO: 0 /100 WBC
PH UR STRIP: >8 [PH] (ref 5–8)
PLATELET # BLD AUTO: 344 K/UL (ref 150–450)
PMV BLD AUTO: 11.8 FL (ref 9.2–12.9)
POTASSIUM SERPL-SCNC: 3.4 MMOL/L (ref 3.5–5.1)
PROT SERPL-MCNC: 8.3 G/DL (ref 6–8.4)
PROT UR QL STRIP: ABNORMAL
RBC # BLD AUTO: 4.01 M/UL (ref 4–5.4)
RBC #/AREA URNS HPF: >100 /HPF (ref 0–4)
SARS-COV-2 RDRP RESP QL NAA+PROBE: NEGATIVE
SODIUM SERPL-SCNC: 138 MMOL/L (ref 136–145)
SP GR UR STRIP: 1.02 (ref 1–1.03)
URN SPEC COLLECT METH UR: ABNORMAL
UROBILINOGEN UR STRIP-ACNC: >=8 EU/DL
WBC # BLD AUTO: 17.11 K/UL (ref 3.9–12.7)
WBC #/AREA URNS HPF: 20 /HPF (ref 0–5)

## 2022-08-16 PROCEDURE — 1111F PR DISCHARGE MEDS RECONCILED W/ CURRENT OUTPATIENT MED LIST: ICD-10-PCS | Mod: CPTII,,, | Performed by: NURSE PRACTITIONER

## 2022-08-16 PROCEDURE — 83690 ASSAY OF LIPASE: CPT | Performed by: NURSE PRACTITIONER

## 2022-08-16 PROCEDURE — 96375 TX/PRO/DX INJ NEW DRUG ADDON: CPT

## 2022-08-16 PROCEDURE — 96361 HYDRATE IV INFUSION ADD-ON: CPT | Mod: 59

## 2022-08-16 PROCEDURE — 3074F PR MOST RECENT SYSTOLIC BLOOD PRESSURE < 130 MM HG: ICD-10-PCS | Mod: CPTII,,, | Performed by: NURSE PRACTITIONER

## 2022-08-16 PROCEDURE — 81000 URINALYSIS NONAUTO W/SCOPE: CPT | Performed by: PHYSICIAN ASSISTANT

## 2022-08-16 PROCEDURE — 81025 URINE PREGNANCY TEST: CPT | Performed by: PHYSICIAN ASSISTANT

## 2022-08-16 PROCEDURE — 96367 TX/PROPH/DG ADDL SEQ IV INF: CPT

## 2022-08-16 PROCEDURE — 25000003 PHARM REV CODE 250: Performed by: NURSE PRACTITIONER

## 2022-08-16 PROCEDURE — 63600175 PHARM REV CODE 636 W HCPCS: Performed by: PHYSICIAN ASSISTANT

## 2022-08-16 PROCEDURE — 96360 HYDRATION IV INFUSION INIT: CPT | Mod: 59

## 2022-08-16 PROCEDURE — 1159F PR MEDICATION LIST DOCUMENTED IN MEDICAL RECORD: ICD-10-PCS | Mod: CPTII,,, | Performed by: NURSE PRACTITIONER

## 2022-08-16 PROCEDURE — G0378 HOSPITAL OBSERVATION PER HR: HCPCS

## 2022-08-16 PROCEDURE — 1111F DSCHRG MED/CURRENT MED MERGE: CPT | Mod: CPTII,,, | Performed by: NURSE PRACTITIONER

## 2022-08-16 PROCEDURE — 3008F BODY MASS INDEX DOCD: CPT | Mod: CPTII,,, | Performed by: NURSE PRACTITIONER

## 2022-08-16 PROCEDURE — 3074F SYST BP LT 130 MM HG: CPT | Mod: CPTII,,, | Performed by: NURSE PRACTITIONER

## 2022-08-16 PROCEDURE — 36415 COLL VENOUS BLD VENIPUNCTURE: CPT | Performed by: PHYSICIAN ASSISTANT

## 2022-08-16 PROCEDURE — 3078F DIAST BP <80 MM HG: CPT | Mod: CPTII,,, | Performed by: NURSE PRACTITIONER

## 2022-08-16 PROCEDURE — 12000002 HC ACUTE/MED SURGE SEMI-PRIVATE ROOM

## 2022-08-16 PROCEDURE — 80053 COMPREHEN METABOLIC PANEL: CPT | Performed by: PHYSICIAN ASSISTANT

## 2022-08-16 PROCEDURE — 63600175 PHARM REV CODE 636 W HCPCS

## 2022-08-16 PROCEDURE — 85025 COMPLETE CBC W/AUTO DIFF WBC: CPT | Performed by: PHYSICIAN ASSISTANT

## 2022-08-16 PROCEDURE — 87040 BLOOD CULTURE FOR BACTERIA: CPT | Mod: 59 | Performed by: PHYSICIAN ASSISTANT

## 2022-08-16 PROCEDURE — 25500020 PHARM REV CODE 255

## 2022-08-16 PROCEDURE — 99214 PR OFFICE/OUTPT VISIT, EST, LEVL IV, 30-39 MIN: ICD-10-PCS | Mod: S$PBB,,, | Performed by: NURSE PRACTITIONER

## 2022-08-16 PROCEDURE — 63600175 PHARM REV CODE 636 W HCPCS: Performed by: NURSE PRACTITIONER

## 2022-08-16 PROCEDURE — 93005 ELECTROCARDIOGRAM TRACING: CPT

## 2022-08-16 PROCEDURE — U0002 COVID-19 LAB TEST NON-CDC: HCPCS | Performed by: EMERGENCY MEDICINE

## 2022-08-16 PROCEDURE — 83605 ASSAY OF LACTIC ACID: CPT | Mod: 91 | Performed by: PHYSICIAN ASSISTANT

## 2022-08-16 PROCEDURE — 99213 OFFICE O/P EST LOW 20 MIN: CPT | Mod: PBBFAC,PO | Performed by: NURSE PRACTITIONER

## 2022-08-16 PROCEDURE — 25000003 PHARM REV CODE 250

## 2022-08-16 PROCEDURE — 93010 ELECTROCARDIOGRAM REPORT: CPT | Mod: ,,, | Performed by: SPECIALIST

## 2022-08-16 PROCEDURE — 87086 URINE CULTURE/COLONY COUNT: CPT | Performed by: PHYSICIAN ASSISTANT

## 2022-08-16 PROCEDURE — 99214 OFFICE O/P EST MOD 30 MIN: CPT | Mod: S$PBB,,, | Performed by: NURSE PRACTITIONER

## 2022-08-16 PROCEDURE — 99999 PR PBB SHADOW E&M-EST. PATIENT-LVL III: CPT | Mod: PBBFAC,,, | Performed by: NURSE PRACTITIONER

## 2022-08-16 PROCEDURE — 99999 PR PBB SHADOW E&M-EST. PATIENT-LVL III: ICD-10-PCS | Mod: PBBFAC,,, | Performed by: NURSE PRACTITIONER

## 2022-08-16 PROCEDURE — 3008F PR BODY MASS INDEX (BMI) DOCUMENTED: ICD-10-PCS | Mod: CPTII,,, | Performed by: NURSE PRACTITIONER

## 2022-08-16 PROCEDURE — 3044F HG A1C LEVEL LT 7.0%: CPT | Mod: CPTII,,, | Performed by: NURSE PRACTITIONER

## 2022-08-16 PROCEDURE — 1160F RVW MEDS BY RX/DR IN RCRD: CPT | Mod: CPTII,,, | Performed by: NURSE PRACTITIONER

## 2022-08-16 PROCEDURE — 1159F MED LIST DOCD IN RCRD: CPT | Mod: CPTII,,, | Performed by: NURSE PRACTITIONER

## 2022-08-16 PROCEDURE — 3044F PR MOST RECENT HEMOGLOBIN A1C LEVEL <7.0%: ICD-10-PCS | Mod: CPTII,,, | Performed by: NURSE PRACTITIONER

## 2022-08-16 PROCEDURE — 36415 COLL VENOUS BLD VENIPUNCTURE: CPT | Performed by: NURSE PRACTITIONER

## 2022-08-16 PROCEDURE — 3078F PR MOST RECENT DIASTOLIC BLOOD PRESSURE < 80 MM HG: ICD-10-PCS | Mod: CPTII,,, | Performed by: NURSE PRACTITIONER

## 2022-08-16 PROCEDURE — 93010 EKG 12-LEAD: ICD-10-PCS | Mod: ,,, | Performed by: SPECIALIST

## 2022-08-16 PROCEDURE — 1160F PR REVIEW ALL MEDS BY PRESCRIBER/CLIN PHARMACIST DOCUMENTED: ICD-10-PCS | Mod: CPTII,,, | Performed by: NURSE PRACTITIONER

## 2022-08-16 PROCEDURE — 99285 EMERGENCY DEPT VISIT HI MDM: CPT | Mod: 25,27

## 2022-08-16 PROCEDURE — 96365 THER/PROPH/DIAG IV INF INIT: CPT

## 2022-08-16 RX ORDER — MORPHINE SULFATE 2 MG/ML
2 INJECTION, SOLUTION INTRAMUSCULAR; INTRAVENOUS
Status: DISCONTINUED | OUTPATIENT
Start: 2022-08-16 | End: 2022-08-17

## 2022-08-16 RX ORDER — LANOLIN ALCOHOL/MO/W.PET/CERES
800 CREAM (GRAM) TOPICAL
Status: DISCONTINUED | OUTPATIENT
Start: 2022-08-16 | End: 2022-08-19

## 2022-08-16 RX ORDER — MORPHINE SULFATE 2 MG/ML
2 INJECTION, SOLUTION INTRAMUSCULAR; INTRAVENOUS EVERY 4 HOURS PRN
Status: DISCONTINUED | OUTPATIENT
Start: 2022-08-16 | End: 2022-08-17

## 2022-08-16 RX ORDER — SODIUM CHLORIDE 0.9 % (FLUSH) 0.9 %
3 SYRINGE (ML) INJECTION
Status: DISCONTINUED | OUTPATIENT
Start: 2022-08-16 | End: 2022-08-22 | Stop reason: HOSPADM

## 2022-08-16 RX ORDER — NALOXONE HCL 0.4 MG/ML
0.02 VIAL (ML) INJECTION
Status: DISCONTINUED | OUTPATIENT
Start: 2022-08-16 | End: 2022-08-22 | Stop reason: HOSPADM

## 2022-08-16 RX ORDER — DOCUSATE SODIUM 100 MG/1
100 CAPSULE, LIQUID FILLED ORAL 2 TIMES DAILY
Status: ON HOLD | COMMUNITY
End: 2022-08-22 | Stop reason: HOSPADM

## 2022-08-16 RX ORDER — IBUPROFEN 200 MG
16 TABLET ORAL
Status: DISCONTINUED | OUTPATIENT
Start: 2022-08-16 | End: 2022-08-19

## 2022-08-16 RX ORDER — ENOXAPARIN SODIUM 100 MG/ML
40 INJECTION SUBCUTANEOUS EVERY 24 HOURS
Status: DISCONTINUED | OUTPATIENT
Start: 2022-08-17 | End: 2022-08-17

## 2022-08-16 RX ORDER — ONDANSETRON 2 MG/ML
4 INJECTION INTRAMUSCULAR; INTRAVENOUS
Status: COMPLETED | OUTPATIENT
Start: 2022-08-16 | End: 2022-08-16

## 2022-08-16 RX ORDER — SODIUM CHLORIDE, SODIUM LACTATE, POTASSIUM CHLORIDE, CALCIUM CHLORIDE 600; 310; 30; 20 MG/100ML; MG/100ML; MG/100ML; MG/100ML
INJECTION, SOLUTION INTRAVENOUS CONTINUOUS
Status: DISCONTINUED | OUTPATIENT
Start: 2022-08-16 | End: 2022-08-22 | Stop reason: HOSPADM

## 2022-08-16 RX ORDER — MORPHINE SULFATE 2 MG/ML
6 INJECTION, SOLUTION INTRAMUSCULAR; INTRAVENOUS
Status: COMPLETED | OUTPATIENT
Start: 2022-08-16 | End: 2022-08-16

## 2022-08-16 RX ORDER — ACETAMINOPHEN 325 MG/1
650 TABLET ORAL EVERY 4 HOURS PRN
Status: DISCONTINUED | OUTPATIENT
Start: 2022-08-16 | End: 2022-08-17

## 2022-08-16 RX ORDER — ACETAMINOPHEN 325 MG/1
650 TABLET ORAL EVERY 6 HOURS PRN
Status: DISCONTINUED | OUTPATIENT
Start: 2022-08-16 | End: 2022-08-22 | Stop reason: HOSPADM

## 2022-08-16 RX ORDER — HYDROCODONE BITARTRATE AND ACETAMINOPHEN 5; 325 MG/1; MG/1
1 TABLET ORAL EVERY 6 HOURS PRN
Status: DISCONTINUED | OUTPATIENT
Start: 2022-08-16 | End: 2022-08-17

## 2022-08-16 RX ORDER — ACETAMINOPHEN 500 MG
1000 TABLET ORAL
Status: ACTIVE | OUTPATIENT
Start: 2022-08-16 | End: 2022-08-17

## 2022-08-16 RX ORDER — TALC
9 POWDER (GRAM) TOPICAL NIGHTLY PRN
Status: DISCONTINUED | OUTPATIENT
Start: 2022-08-16 | End: 2022-08-22 | Stop reason: HOSPADM

## 2022-08-16 RX ORDER — ONDANSETRON 2 MG/ML
4 INJECTION INTRAMUSCULAR; INTRAVENOUS EVERY 6 HOURS PRN
Status: DISCONTINUED | OUTPATIENT
Start: 2022-08-16 | End: 2022-08-17

## 2022-08-16 RX ORDER — GLUCAGON 1 MG
1 KIT INJECTION
Status: DISCONTINUED | OUTPATIENT
Start: 2022-08-16 | End: 2022-08-19

## 2022-08-16 RX ORDER — AMOXICILLIN 250 MG
1 CAPSULE ORAL 2 TIMES DAILY
Status: DISCONTINUED | OUTPATIENT
Start: 2022-08-16 | End: 2022-08-22 | Stop reason: HOSPADM

## 2022-08-16 RX ORDER — IBUPROFEN 200 MG
24 TABLET ORAL
Status: DISCONTINUED | OUTPATIENT
Start: 2022-08-16 | End: 2022-08-19

## 2022-08-16 RX ADMIN — ONDANSETRON 4 MG: 2 INJECTION INTRAMUSCULAR; INTRAVENOUS at 05:08

## 2022-08-16 RX ADMIN — SODIUM CHLORIDE, SODIUM LACTATE, POTASSIUM CHLORIDE, AND CALCIUM CHLORIDE 2775 ML: .6; .31; .03; .02 INJECTION, SOLUTION INTRAVENOUS at 05:08

## 2022-08-16 RX ADMIN — SODIUM CHLORIDE, SODIUM LACTATE, POTASSIUM CHLORIDE, AND CALCIUM CHLORIDE: .6; .31; .03; .02 INJECTION, SOLUTION INTRAVENOUS at 10:08

## 2022-08-16 RX ADMIN — MORPHINE SULFATE 6 MG: 2 INJECTION, SOLUTION INTRAMUSCULAR; INTRAVENOUS at 05:08

## 2022-08-16 RX ADMIN — VANCOMYCIN HYDROCHLORIDE 1750 MG: 1 INJECTION, POWDER, LYOPHILIZED, FOR SOLUTION INTRAVENOUS at 06:08

## 2022-08-16 RX ADMIN — MORPHINE SULFATE 6 MG: 2 INJECTION, SOLUTION INTRAMUSCULAR; INTRAVENOUS at 08:08

## 2022-08-16 RX ADMIN — HYDROCODONE BITARTRATE AND ACETAMINOPHEN 1 TABLET: 5; 325 TABLET ORAL at 10:08

## 2022-08-16 RX ADMIN — PIPERACILLIN SODIUM AND TAZOBACTAM SODIUM 4.5 G: 4; .5 INJECTION, POWDER, LYOPHILIZED, FOR SOLUTION INTRAVENOUS at 05:08

## 2022-08-16 RX ADMIN — IOHEXOL 100 ML: 350 INJECTION, SOLUTION INTRAVENOUS at 05:08

## 2022-08-16 NOTE — PATIENT INSTRUCTIONS
Reid San,     If you are due for any health screening(s) below please notify me so we can arrange them to be ordered and scheduled to maintain your health. Most healthy patients complete it. Don't lose out on improving your health.      - To ER now for further evaluation

## 2022-08-16 NOTE — PROGRESS NOTES
Subjective:       Patient ID: Mena Ovalle is a 26 y.o. female.    Chief Complaint: Hospital Follow Up    26-year-old female presents to the clinic today for hospital follow-up.  She is admitted to [facility] from August 7, 2022 through August 12, 2022.  She had a laparoscopic cholecystectomy on 08/08/2022.  She also had an ERCP with a stent placement on 08/10/2022.  She states she has been running fever for the last 2 days.  She took Tylenol earlier today.  Her fever presently is 100.5.  She says she is having generalized abdominal pain with abdominal distention over the last 2 days.  She denies any vomiting or diarrhea.  She does complain of nausea which she is taking Zofran for.  She also has not been eating and feels very weak.  I am going to send her back to the emergency room for further evaluation.    Past Medical History:   Diagnosis Date    Abnormal Pap smear of cervix     repeat pap    Bronchitis     COVID 01/18/2022    Family history of pulmonary embolism     MOTHER with MTHFR, PE found after knee replacement.     Iron deficiency anemia 5/17/2018    Pneumonia      Past Surgical History:   Procedure Laterality Date    ERCP N/A 8/10/2022    Procedure: ERCP (ENDOSCOPIC RETROGRADE CHOLANGIOPANCREATOGRAPHY);  Surgeon: Ruddy Jessica III, MD;  Location: [facility];  Service: Endoscopy;  Laterality: N/A;    LAPAROSCOPIC CHOLECYSTECTOMY N/A 8/8/2022    Procedure: CHOLECYSTECTOMY, LAPAROSCOPIC;  Surgeon: Virgil Mendoza III, MD;  Location: [facility];  Service: General;  Laterality: N/A;    WISDOM TOOTH EXTRACTION  11/15/2019      reports that she has never smoked. She has never used smokeless tobacco. She reports current alcohol use. She reports that she does not use drugs.  Review of Systems   Constitutional: Positive for chills, diaphoresis and fever.   Respiratory: Negative for cough, shortness of breath and wheezing.    Gastrointestinal: Positive for abdominal  distention, abdominal pain and nausea. Negative for diarrhea and vomiting.   Genitourinary: Negative for difficulty urinating, dysuria, flank pain, frequency and hematuria.   Musculoskeletal: Negative for gait problem.   Neurological: Positive for weakness. Negative for dizziness, light-headedness and headaches.       Objective:      Physical Exam  Vitals and nursing note reviewed.   Constitutional:       Appearance: She is ill-appearing.   HENT:      Head: Normocephalic and atraumatic.   Cardiovascular:      Rate and Rhythm: Tachycardia present.      Heart sounds: Normal heart sounds. No murmur heard.  Pulmonary:      Effort: Pulmonary effort is normal.      Breath sounds: Normal breath sounds.   Abdominal:      General: There is distension.      Tenderness: There is abdominal tenderness.      Comments: Abdomen generalized tenderness noted with abdominal distension bowel sounds decreased    Musculoskeletal:         General: Normal range of motion.   Skin:     General: Skin is warm and dry.   Neurological:      Mental Status: She is alert and oriented to person, place, and time.   Psychiatric:         Mood and Affect: Mood normal.         Behavior: Behavior normal.         Thought Content: Thought content normal.         Judgment: Judgment normal.         Assessment:       1. Hospital discharge follow-up    2. Fever, unspecified fever cause    3. Generalized abdominal pain    4. Abdominal distension        Plan:         Hospital discharge follow-up    Fever, unspecified fever cause    Generalized abdominal pain    Abdominal distension    Other orders  -     Cancel: CBC Auto Differential; Future; Expected date: 08/16/2022  -     Cancel: Comprehensive Metabolic Panel; Future; Expected date: 08/16/2022  - To ER now for further evaluation   - report was called to the ER      No follow-ups on file.

## 2022-08-16 NOTE — ED NOTES
Assumed care:  Mena Ovalle is awake, alert and oriented x 3, skin warm and dry, in NAD with family at bedside.  Patient with recent GB surgery CO fever, abd pain and swelling, and N&V.    Patient identifiers for Mena Ovalle checked and correct.  LOC:  Mena Ovalle is awake, alert, and aware of environment with an appropriate affect. She is oriented x 3 and speaking appropriately.  APPEARANCE:  She is resting comfortably and in no acute distress. She is clean and well groomed, patient's clothing is properly fastened.  SKIN:  The skin is warm and dry. She has normal skin turgor and moist mucus membranes.surgical incisions x 3 to abd, healing  MUSCULOSKELETAL:  She is moving all extremities well, no obvious deformities noted. Pulses intact.   RESPIRATORY:  Airway is open and patent. Respirations are spontaneous and non-labored with normal effort and rate.  CARDIAC:  She has a normal rate and rhythm. No peripheral edema noted. Capillary refill < 3 seconds.  ABDOMEN:  No distention noted.  Soft and non-tender upon palpation.  abd pain, N&V, LUQ swelling  NEUROLOGICAL:  PERRL. Facial expression is symmetrical. Hand grasps are equal bilaterally. Normal sensation in all extremities when touched with finger.  Allergies reported:  Review of patient's allergies indicates:  No Known Allergies  OTHER NOTES:

## 2022-08-16 NOTE — ED PROVIDER NOTES
Encounter Date: 8/16/2022    SCRIBE #1 NOTE: I, Yue Landeros, am scribing for, and in the presence of, JOYCE LeyvaC.       History     Chief Complaint   Patient presents with    Post-op Problem     Patient states she was just released from gall bladder surgery last Monday was discharged Saturday from [facility] was sent from [facility]'s office for further eval due to fever      Time seen by provider: 4:29 PM on 08/16/2022    Mena Ovalle is a 26 y.o. female presenting from her PCP clinic due to progressively worsening upper abdominal pain, fever, lightheadedness, nausea, and vomiting. Patient was admitted to [facility] from 8/7/22 through 8/12/22. She underwent a laparoscopic cholecystectomy on 08/08/2022 by Dr. Mendoza and an ERCP with a stent placement on 08/10/22 with Dr. Jessica. Patient has had a fever for the past two days and has taken Tylenol with minimal improvement. Patient is also having upper abdominal pain with left-sided abdominal distention over the last 2 days. Patient denies blood in the vomit, melena, blood in the stool, or urinary symptoms. She has only been able to eat applesauce the past two days and has been taking Zofran with minimal relief of her nausea. Patient is currently on stool softeners and reports diarrhea today. She reports being on her menstrual cycle and gave birth vaginally 6 months ago.    The history is provided by the patient.     Review of patient's allergies indicates:  No Known Allergies  Past Medical History:   Diagnosis Date    Abnormal Pap smear of cervix     repeat pap    Bronchitis     COVID 01/18/2022    Family history of pulmonary embolism     MOTHER with MTHFR, PE found after knee replacement.     Iron deficiency anemia 5/17/2018    Pneumonia      Past Surgical History:   Procedure Laterality Date    ERCP N/A 8/10/2022    Procedure: ERCP (ENDOSCOPIC RETROGRADE CHOLANGIOPANCREATOGRAPHY);  Surgeon: Ruddy Jessica III, MD;  Location:  [facility];  Service: Endoscopy;  Laterality: N/A;    LAPAROSCOPIC CHOLECYSTECTOMY N/A 8/8/2022    Procedure: CHOLECYSTECTOMY, LAPAROSCOPIC;  Surgeon: Virgil Mendoza III, MD;  Location: [facility];  Service: General;  Laterality: N/A;    WISDOM TOOTH EXTRACTION  11/15/2019     Family History   Problem Relation Age of Onset    Breast cancer Maternal Grandmother     Lymphoma Maternal Grandmother     Cervical cancer Maternal Grandmother     Pulmonary embolism Mother         MTHFR    Glaucoma Neg Hx     Retinal detachment Neg Hx     Macular degeneration Neg Hx     Ovarian cancer Neg Hx      Social History     Tobacco Use    Smoking status: Never Smoker    Smokeless tobacco: Never Used   Substance Use Topics    Alcohol use: Yes    Drug use: No     Review of Systems   Constitutional: Positive for appetite change and fever.   HENT: Negative for sore throat.    Respiratory: Negative for shortness of breath.    Cardiovascular: Negative for chest pain.   Gastrointestinal: Positive for abdominal distention, abdominal pain, diarrhea, nausea and vomiting. Negative for blood in stool.        Negative for melena or hematemesis.   Genitourinary: Negative for difficulty urinating, dysuria and hematuria.   Musculoskeletal: Negative for back pain.   Skin: Negative for rash.   Neurological: Positive for light-headedness. Negative for weakness.   Hematological: Does not bruise/bleed easily.   All other systems reviewed and are negative.      Physical Exam     Initial Vitals [08/16/22 1558]   BP Pulse Resp Temp SpO2   (!) 142/76 110 20 (!) 100.4 °F (38 °C) 98 %      MAP       --         Physical Exam    Nursing note and vitals reviewed.  Constitutional: Vital signs are normal. She appears well-developed and well-nourished.   HENT:   Head: Normocephalic and atraumatic.   Eyes: Pupils are equal, round, and reactive to light.   Neck: Neck supple.   Cardiovascular: Regular rhythm, normal heart sounds and intact distal pulses.  Tachycardia present.  Exam reveals no gallop and no friction rub.    No murmur heard.  Pulmonary/Chest: Breath sounds normal. She has no wheezes. She has no rhonchi. She has no rales.   Abdominal: Abdomen is soft. Bowel sounds are normal. There is abdominal tenderness in the right upper quadrant and left upper quadrant.   Laparoscopic incision noted that is well approximated without drainage or erythema.   Musculoskeletal:      Cervical back: Neck supple.     Neurological: She is alert and oriented to person, place, and time. She has normal strength.   Skin: Skin is warm, dry and intact. There is pallor.   Psychiatric: She has a normal mood and affect. Her speech is normal and behavior is normal.         ED Course   Procedures  Labs Reviewed   CBC W/ AUTO DIFFERENTIAL - Abnormal; Notable for the following components:       Result Value    WBC 17.11 (*)     Hemoglobin 10.8 (*)     Hematocrit 33.0 (*)     MCH 26.9 (*)     Immature Granulocytes 1.1 (*)     Gran # (ANC) 13.3 (*)     Immature Grans (Abs) 0.19 (*)     Mono # 1.3 (*)     Gran % 77.9 (*)     Lymph % 12.2 (*)     All other components within normal limits   COMPREHENSIVE METABOLIC PANEL - Abnormal; Notable for the following components:    Potassium 3.4 (*)     Albumin 2.8 (*)     Alkaline Phosphatase 274 (*)     AST 94 (*)      (*)     Anion Gap 17 (*)     All other components within normal limits   URINALYSIS, REFLEX TO URINE CULTURE - Abnormal; Notable for the following components:    Color, UA Brown (*)     Appearance, UA Hazy (*)     pH, UA >8.0 (*)     Protein, UA 2+ (*)     Bilirubin (UA) 1+ (*)     Occult Blood UA 3+ (*)     Urobilinogen, UA >=8.0 (*)     All other components within normal limits    Narrative:     Specimen Source->Urine   URINALYSIS MICROSCOPIC - Abnormal; Notable for the following components:    RBC, UA >100 (*)     WBC, UA 20 (*)     Bacteria Few (*)     All other components within normal limits    Narrative:     Specimen  Source->Urine   LIPASE - Abnormal; Notable for the following components:    Lipase 118 (*)     All other components within normal limits   CULTURE, BLOOD   CULTURE, BLOOD   CULTURE, URINE   LACTIC ACID, PLASMA   PREGNANCY TEST, URINE RAPID    Narrative:     Specimen Source->Urine   SARS-COV-2 RNA AMPLIFICATION, QUAL   LACTIC ACID, PLASMA          Imaging Results          CT Abdomen Pelvis With Contrast (Final result)  Result time 08/16/22 18:32:49    Final result by Tierra Robin MD (08/16/22 18:32:49)                 Impression:      Fat stranding, intra-abdominal fluid with small amount of free fluid in the dependent pelvis and scattered collections of fluid elsewhere.  Decrease of the fluid in the gallbladder fossa compared to the prior MRI of 08/09/2022 and overall the fluid collections appear mildly decreased as well.  Although findings could be infectious/inflammatory and phlegmon no walled-off drainable appearing abscess collection is seen at this time.  Interval insertion of biliary stent since the prior MRCP 08/09/2022.  Also with some of the collections peripancreatic pancreatitis primary or secondary could also be present.      Electronically signed by: Tierra Robin MD  Date:    08/16/2022  Time:    18:32             Narrative:    EXAMINATION:  CT ABDOMEN PELVIS WITH CONTRAST    CLINICAL HISTORY:  Abdominal pain, post-op;Abdominal abscess/infection suspected;    TECHNIQUE:  Low dose axial images, sagittal and coronal reformations were obtained from the lung bases to the pubic symphysis following the IV administration of 100 mL of Omnipaque 350 and no p.o. contrast    COMPARISON:  08/07/2022 CT    08/09/2022 MRI    FINDINGS:  Small bilateral pleural effusions and posterior bibasilar consolidation atelectasis left more so than right.    3-4 mm hypodensity in the liver suggesting cyst.  Cholecystectomy clips.  Biliary stent extending from region common duct into the duodenum.  Scattered collections of  intra-abdominal fluid including along the posterior margin of the stomach, adjacent to the pancreas, left pericolic gutter with trace fluid also right pericolic gutter and small amount of scattered elsewhere.  One collection posterior to the stomach for example axial series 2, image 54 measures 3 by 2.3 cm.  Small amount of free fluid also seen in the dependent portion of the pelvis.  Mild fat stranding and fat stranding fluid is seen in part around the pancreas.  Trace fluid in the gallbladder fossa.    Pancreas not enlarged no apparent mass or ductal dilatation    Spleen not enlarged    Adrenal glands unremarkable appearance    Renal enhancement is symmetrical and there is no hydronephrosis.  4 mm hypodensity right kidney suggesting a cyst.  Urinary bladder mildly distended at time of the exam and unremarkable appearance    Reproductive organs; IUD in the uterus.  Adnexal region unremarkable in appearance    Normal appearing appendix is suggested and no abnormal no free intraperitoneal air.    Subcutaneous abdominal wall fluid edema    Fluid collections and fat stranding new compared to the prior CT of 08/07/2022.  Patient has had interval cholecystectomy.  The fluid in the gallbladder fossa has decreased compared to the prior exam and the abdominal fluid collections also appears slightly decreased.  Pelvis was not included on the prior MRI.                               X-Ray Chest AP Portable (Final result)  Result time 08/16/22 16:54:11    Final result by Raymond Ronquillo MD (08/16/22 16:54:11)                 Impression:      Left pleural effusion and left basilar consolidation which may represent pneumonia.      Electronically signed by: Raymond Ronquillo MD  Date:    08/16/2022  Time:    16:54             Narrative:    EXAMINATION:  XR CHEST AP PORTABLE    CLINICAL HISTORY:  Sepsis;    TECHNIQUE:  Single frontal view of the chest was performed.    COMPARISON:  11/29/2017    FINDINGS:  The  cardiomediastinal silhouette is with normal limits.  There is a small left pleural effusion and moderate left basilar airspace disease.                                 Medications   acetaminophen tablet 1,000 mg (1,000 mg Oral Not Given 8/16/22 1630)   morphine injection 2 mg ( Intravenous Canceled Entry 8/16/22 2030)   lactated ringers bolus 2,775 mL (0 mL/kg × 92.5 kg Intravenous Stopped 8/16/22 1940)   vancomycin (VANCOCIN) 1,750 mg in dextrose 5 % 500 mL IVPB (1,750 mg Intravenous New Bag 8/16/22 1838)   piperacillin-tazobactam 4.5 g in dextrose 5 % 100 mL IVPB (ready to mix system) (0 g Intravenous Stopped 8/16/22 1835)   ondansetron injection 4 mg (4 mg Intravenous Given 8/16/22 1716)   morphine injection 6 mg (6 mg Intravenous Given 8/16/22 1721)   iohexoL (OMNIPAQUE 350) 350 mg iodine/mL injection (100 mLs Intravenous Given 8/16/22 1755)   morphine injection 6 mg (6 mg Intravenous Given 8/16/22 2027)     Medical Decision Making:   History:   Old Medical Records: I decided to obtain old medical records.  Differential Diagnosis:   Abscess  Obstruction  pneumonia  Clinical Tests:   Lab Tests: Ordered and Reviewed  Radiological Study: Reviewed and Ordered  Medical Tests: Ordered and Reviewed       APC / Resident Notes:   Patient is a 26 y.o. female who presents to the ED 08/16/2022 who underwent emergent evaluation for fever and abdominal pain s/p cholecystectomy with subsequent ERCP with biliary stent placement.  Pt has fever and is tachycardic on arrival. She not hypoxic and no acute respiratory distress.  Septic workup initiated.  Noted leukocytosis.  Tachycardia resolves with fever control and IV fluids.  IV antibiotics are initiated in the emergency department and blood cultures are pending.  CT of abdomen pelvis without acute findings.  Bilirubin level normal.  I doubt biliary obstruction.  No evidence of abscess at this time.  Chest x-ray concerning for pneumonia which may be the etiology of her fever.   All findings and case discussed with patient's surgeon Dr. olson who agrees with admission with IV fluids and antibiotics and observation.  Lipase is noted to be elevated. No evidence of necrotizing pancreatitis. Pt admitted for further evaluation and treatment of fever, post op pain, and pneumonia. Case discussed with  team who is accepting of this admission. Case discussed with Dr. Bush who is agreeable to plan of care.          Scribe Attestation:   Scribe #1: I performed the above scribed service and the documentation accurately describes the services I performed. I attest to the accuracy of the note.    Attending Attestation:     Physician Attestation Statement for NP/PA:   I have conducted a face to face encounter with this patient in addition to the NP/PA, due to Medical Complexity    Other NP/PA Attestation Additions:      Medical Decision Making: I provided a face to face evaluation of this patient.  I discussed the patient's care with Advanced Practice Clinician.  I reviewed their note and agree with the history, physical, assessment, diagnosis, treatment, and admission plan provided by the Advanced Practice Clinician. My overall impression is fever, sepsis.  The patient will be admitted to the hospital         Physician Attestation for Scribe:  Physician Attestation Statement for Scribe #1: I, Lesley Mooyd, reviewed documentation, as scribed by in my presence, and it is both accurate and complete.     Comments: I, Lesley Moody NP-C, personally performed the services described in this documentation. All medical record entries made by the scribe were at my direction and in my presence.  I have reviewed the chart and agree that the record reflects my personal performance and is accurate and complete. ANTOINE Leyva.  8:43 PM 08/16/2022          ED Course as of 08/16/22 2048   Tue Aug 16, 2022   1616 SpO2: 98 % [EF]   1616 Resp: 20 [EF]   1616 Pulse: 110 [EF]   1616 Temp src: Oral [EF]   1616  Temp(!): 100.4 °F (38 °C) [EF]   1616 BP(!): 142/76 [EF]   1641 Seen by MD [EF]   1653 WBC(!): 17.11 [EF]   1701 Spoke with Dr. Tran who recommends abdominal CT with contrast pending metabolic panel. [JK]   1707 X-Ray Chest AP Portable [EF]   1844 CT Abdomen Pelvis With Contrast [EF]   1956 Lipase(!): 118 [JK]      ED Course User Index  [EF] Jarek Bush MD  [JK] Lesley Moody NP             Clinical Impression:   Final diagnoses:  [R00.0] Tachycardia  [A41.9, R65.20] Severe sepsis          ED Disposition Condition    Observation               Lesley Moody NP  08/16/22 2048       Lesley Moody NP  08/16/22 2049       Jarek Bush MD  08/16/22 4904

## 2022-08-17 PROBLEM — G89.18 POST-OPERATIVE PAIN: Status: ACTIVE | Noted: 2022-08-16

## 2022-08-17 PROBLEM — E87.6 HYPOKALEMIA: Status: ACTIVE | Noted: 2022-08-17

## 2022-08-17 PROBLEM — J18.9 PNA (PNEUMONIA): Status: ACTIVE | Noted: 2022-08-17

## 2022-08-17 PROBLEM — T81.9XXA POST-OPERATIVE COMPLICATION: Status: ACTIVE | Noted: 2022-08-16

## 2022-08-17 LAB
ALBUMIN SERPL BCP-MCNC: 2.1 G/DL (ref 3.5–5.2)
ALP SERPL-CCNC: 190 U/L (ref 55–135)
ALT SERPL W/O P-5'-P-CCNC: 79 U/L (ref 10–44)
ANION GAP SERPL CALC-SCNC: 14 MMOL/L (ref 8–16)
AST SERPL-CCNC: 39 U/L (ref 10–40)
BASOPHILS # BLD AUTO: 0.06 K/UL (ref 0–0.2)
BASOPHILS NFR BLD: 0.3 % (ref 0–1.9)
BILIRUB SERPL-MCNC: 0.7 MG/DL (ref 0.1–1)
BUN SERPL-MCNC: 6 MG/DL (ref 6–20)
CALCIUM SERPL-MCNC: 8.3 MG/DL (ref 8.7–10.5)
CHLORIDE SERPL-SCNC: 99 MMOL/L (ref 95–110)
CO2 SERPL-SCNC: 24 MMOL/L (ref 23–29)
CREAT SERPL-MCNC: 0.7 MG/DL (ref 0.5–1.4)
DIFFERENTIAL METHOD: ABNORMAL
EOSINOPHIL # BLD AUTO: 0.2 K/UL (ref 0–0.5)
EOSINOPHIL NFR BLD: 1 % (ref 0–8)
ERYTHROCYTE [DISTWIDTH] IN BLOOD BY AUTOMATED COUNT: 14.5 % (ref 11.5–14.5)
EST. GFR  (NO RACE VARIABLE): >60 ML/MIN/1.73 M^2
GLUCOSE SERPL-MCNC: 101 MG/DL (ref 70–110)
HCT VFR BLD AUTO: 27.8 % (ref 37–48.5)
HGB BLD-MCNC: 9.2 G/DL (ref 12–16)
IMM GRANULOCYTES # BLD AUTO: 0.23 K/UL (ref 0–0.04)
IMM GRANULOCYTES NFR BLD AUTO: 1.3 % (ref 0–0.5)
LIPASE SERPL-CCNC: 104 U/L (ref 4–60)
LYMPHOCYTES # BLD AUTO: 1.9 K/UL (ref 1–4.8)
LYMPHOCYTES NFR BLD: 10.5 % (ref 18–48)
MAGNESIUM SERPL-MCNC: 1.8 MG/DL (ref 1.6–2.6)
MCH RBC QN AUTO: 27.3 PG (ref 27–31)
MCHC RBC AUTO-ENTMCNC: 33.1 G/DL (ref 32–36)
MCV RBC AUTO: 83 FL (ref 82–98)
MONOCYTES # BLD AUTO: 1.6 K/UL (ref 0.3–1)
MONOCYTES NFR BLD: 9.1 % (ref 4–15)
NEUTROPHILS # BLD AUTO: 13.8 K/UL (ref 1.8–7.7)
NEUTROPHILS NFR BLD: 77.8 % (ref 38–73)
NRBC BLD-RTO: 0 /100 WBC
PHOSPHATE SERPL-MCNC: 4.2 MG/DL (ref 2.7–4.5)
PLATELET # BLD AUTO: 380 K/UL (ref 150–450)
PMV BLD AUTO: 11.9 FL (ref 9.2–12.9)
POTASSIUM SERPL-SCNC: 3.3 MMOL/L (ref 3.5–5.1)
PROT SERPL-MCNC: 6.2 G/DL (ref 6–8.4)
RBC # BLD AUTO: 3.37 M/UL (ref 4–5.4)
SODIUM SERPL-SCNC: 137 MMOL/L (ref 136–145)
WBC # BLD AUTO: 17.72 K/UL (ref 3.9–12.7)

## 2022-08-17 PROCEDURE — 12000002 HC ACUTE/MED SURGE SEMI-PRIVATE ROOM

## 2022-08-17 PROCEDURE — 25000003 PHARM REV CODE 250

## 2022-08-17 PROCEDURE — 94761 N-INVAS EAR/PLS OXIMETRY MLT: CPT

## 2022-08-17 PROCEDURE — 63600175 PHARM REV CODE 636 W HCPCS

## 2022-08-17 PROCEDURE — 36415 COLL VENOUS BLD VENIPUNCTURE: CPT

## 2022-08-17 PROCEDURE — 85025 COMPLETE CBC W/AUTO DIFF WBC: CPT

## 2022-08-17 PROCEDURE — 83690 ASSAY OF LIPASE: CPT | Performed by: HOSPITALIST

## 2022-08-17 PROCEDURE — 36415 COLL VENOUS BLD VENIPUNCTURE: CPT | Performed by: HOSPITALIST

## 2022-08-17 PROCEDURE — 84100 ASSAY OF PHOSPHORUS: CPT

## 2022-08-17 PROCEDURE — 80053 COMPREHEN METABOLIC PANEL: CPT

## 2022-08-17 PROCEDURE — 83735 ASSAY OF MAGNESIUM: CPT

## 2022-08-17 PROCEDURE — 63600175 PHARM REV CODE 636 W HCPCS: Performed by: HOSPITALIST

## 2022-08-17 PROCEDURE — 99900035 HC TECH TIME PER 15 MIN (STAT)

## 2022-08-17 RX ORDER — ONDANSETRON 2 MG/ML
8 INJECTION INTRAMUSCULAR; INTRAVENOUS EVERY 6 HOURS PRN
Status: DISCONTINUED | OUTPATIENT
Start: 2022-08-17 | End: 2022-08-22 | Stop reason: HOSPADM

## 2022-08-17 RX ORDER — POTASSIUM CHLORIDE 7.45 MG/ML
40 INJECTION INTRAVENOUS
Status: DISCONTINUED | OUTPATIENT
Start: 2022-08-17 | End: 2022-08-19

## 2022-08-17 RX ORDER — CALCIUM GLUCONATE 20 MG/ML
3 INJECTION, SOLUTION INTRAVENOUS
Status: DISCONTINUED | OUTPATIENT
Start: 2022-08-17 | End: 2022-08-19

## 2022-08-17 RX ORDER — CALCIUM GLUCONATE 20 MG/ML
2 INJECTION, SOLUTION INTRAVENOUS
Status: DISCONTINUED | OUTPATIENT
Start: 2022-08-17 | End: 2022-08-19

## 2022-08-17 RX ORDER — IPRATROPIUM BROMIDE AND ALBUTEROL SULFATE 2.5; .5 MG/3ML; MG/3ML
3 SOLUTION RESPIRATORY (INHALATION) EVERY 6 HOURS PRN
Status: DISCONTINUED | OUTPATIENT
Start: 2022-08-17 | End: 2022-08-22 | Stop reason: HOSPADM

## 2022-08-17 RX ORDER — HYDROMORPHONE HYDROCHLORIDE 1 MG/ML
1 INJECTION, SOLUTION INTRAMUSCULAR; INTRAVENOUS; SUBCUTANEOUS EVERY 4 HOURS PRN
Status: DISCONTINUED | OUTPATIENT
Start: 2022-08-17 | End: 2022-08-21

## 2022-08-17 RX ORDER — POTASSIUM CHLORIDE 7.45 MG/ML
80 INJECTION INTRAVENOUS
Status: DISCONTINUED | OUTPATIENT
Start: 2022-08-17 | End: 2022-08-19

## 2022-08-17 RX ORDER — CALCIUM GLUCONATE 20 MG/ML
1 INJECTION, SOLUTION INTRAVENOUS
Status: DISCONTINUED | OUTPATIENT
Start: 2022-08-17 | End: 2022-08-19

## 2022-08-17 RX ORDER — POTASSIUM CHLORIDE 7.45 MG/ML
60 INJECTION INTRAVENOUS
Status: DISCONTINUED | OUTPATIENT
Start: 2022-08-17 | End: 2022-08-19

## 2022-08-17 RX ORDER — MAGNESIUM SULFATE HEPTAHYDRATE 40 MG/ML
2 INJECTION, SOLUTION INTRAVENOUS
Status: DISCONTINUED | OUTPATIENT
Start: 2022-08-17 | End: 2022-08-19

## 2022-08-17 RX ORDER — MAGNESIUM SULFATE HEPTAHYDRATE 40 MG/ML
4 INJECTION, SOLUTION INTRAVENOUS
Status: DISCONTINUED | OUTPATIENT
Start: 2022-08-17 | End: 2022-08-19

## 2022-08-17 RX ORDER — HYDROMORPHONE HYDROCHLORIDE 1 MG/ML
0.5 INJECTION, SOLUTION INTRAMUSCULAR; INTRAVENOUS; SUBCUTANEOUS EVERY 4 HOURS PRN
Status: DISCONTINUED | OUTPATIENT
Start: 2022-08-17 | End: 2022-08-17

## 2022-08-17 RX ADMIN — VANCOMYCIN HYDROCHLORIDE 1500 MG: 1.5 INJECTION, POWDER, LYOPHILIZED, FOR SOLUTION INTRAVENOUS at 06:08

## 2022-08-17 RX ADMIN — ONDANSETRON 8 MG: 2 INJECTION INTRAMUSCULAR; INTRAVENOUS at 11:08

## 2022-08-17 RX ADMIN — HYDROMORPHONE HYDROCHLORIDE 1 MG: 1 INJECTION, SOLUTION INTRAMUSCULAR; INTRAVENOUS; SUBCUTANEOUS at 02:08

## 2022-08-17 RX ADMIN — DOCUSATE SODIUM AND SENNOSIDES 1 TABLET: 8.6; 5 TABLET, FILM COATED ORAL at 08:08

## 2022-08-17 RX ADMIN — ONDANSETRON 8 MG: 2 INJECTION INTRAMUSCULAR; INTRAVENOUS at 06:08

## 2022-08-17 RX ADMIN — MORPHINE SULFATE 2 MG: 2 INJECTION, SOLUTION INTRAMUSCULAR; INTRAVENOUS at 06:08

## 2022-08-17 RX ADMIN — PIPERACILLIN SODIUM AND TAZOBACTAM SODIUM 4.5 G: 4; .5 INJECTION, POWDER, LYOPHILIZED, FOR SOLUTION INTRAVENOUS at 01:08

## 2022-08-17 RX ADMIN — POTASSIUM BICARBONATE 35 MEQ: 391 TABLET, EFFERVESCENT ORAL at 06:08

## 2022-08-17 RX ADMIN — PIPERACILLIN SODIUM AND TAZOBACTAM SODIUM 4.5 G: 4; .5 INJECTION, POWDER, LYOPHILIZED, FOR SOLUTION INTRAVENOUS at 06:08

## 2022-08-17 RX ADMIN — HYDROMORPHONE HYDROCHLORIDE 1 MG: 1 INJECTION, SOLUTION INTRAMUSCULAR; INTRAVENOUS; SUBCUTANEOUS at 06:08

## 2022-08-17 RX ADMIN — MORPHINE SULFATE 2 MG: 2 INJECTION, SOLUTION INTRAMUSCULAR; INTRAVENOUS at 02:08

## 2022-08-17 RX ADMIN — HYDROMORPHONE HYDROCHLORIDE 0.5 MG: 1 INJECTION, SOLUTION INTRAMUSCULAR; INTRAVENOUS; SUBCUTANEOUS at 11:08

## 2022-08-17 RX ADMIN — HYDROMORPHONE HYDROCHLORIDE 1 MG: 1 INJECTION, SOLUTION INTRAMUSCULAR; INTRAVENOUS; SUBCUTANEOUS at 10:08

## 2022-08-17 RX ADMIN — PIPERACILLIN SODIUM AND TAZOBACTAM SODIUM 4.5 G: 4; .5 INJECTION, POWDER, LYOPHILIZED, FOR SOLUTION INTRAVENOUS at 11:08

## 2022-08-17 NOTE — ASSESSMENT & PLAN NOTE
Acute:  - concerns for PAN on CXR:  - IV abx  - respiratory culture ordered  - prn bronchodilators

## 2022-08-17 NOTE — SUBJECTIVE & OBJECTIVE
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     Iron deficiency anemia 5/17/2018    Pneumonia        Past Surgical History:   Procedure Laterality Date    ERCP N/A 8/10/2022    Procedure: ERCP (ENDOSCOPIC RETROGRADE CHOLANGIOPANCREATOGRAPHY);  Surgeon: Ruddy Jessica III, MD;  Location: [facility];  Service: Endoscopy;  Laterality: N/A;    LAPAROSCOPIC CHOLECYSTECTOMY N/A 8/8/2022    Procedure: CHOLECYSTECTOMY, LAPAROSCOPIC;  Surgeon: Virgil Mendoza III, MD;  Location: [facility];  Service: General;  Laterality: N/A;    WISDOM TOOTH EXTRACTION  11/15/2019       Review of patient's allergies indicates:  No Known Allergies    No current facility-administered medications on file prior to encounter.     Current Outpatient Medications on File Prior to Encounter   Medication Sig    amoxicillin-clavulanate 875-125mg (AUGMENTIN) 875-125 mg per tablet Take 1 tablet by mouth every 12 (twelve) hours. Take with food for 10 days    dicyclomine (BENTYL) 10 MG capsule Take 1 capsule (10 mg total) by mouth 4 (four) times daily as needed (abdominal pain).    docusate sodium (COLACE) 100 MG capsule Take 100 mg by mouth 2 (two) times daily.    gabapentin (NEURONTIN) 100 MG capsule Take 100 mg by mouth 2 (two) times daily. Pt takes only at HS    HYDROcodone-acetaminophen (NORCO) 5-325 mg per tablet Take 1 tablet by mouth every 6 (six) hours as needed for Pain.    ondansetron (ZOFRAN-ODT) 4 MG TbDL Take 2 tablets (8 mg total) by mouth every 8 (eight) hours as needed (nausea/vomiting).    traMADoL (ULTRAM) 50 mg tablet Take 1 tablet (50 mg total) by mouth every 6 (six) hours as needed for Pain.     Family History       Problem Relation (Age of Onset)    Breast cancer Maternal Grandmother    Cervical cancer Maternal Grandmother    Lymphoma Maternal Grandmother     Pulmonary embolism Mother          Tobacco Use    Smoking status: Never Smoker    Smokeless tobacco: Never Used   Substance and Sexual Activity    Alcohol use: Yes    Drug use: No    Sexual activity: Yes     Partners: Male     Review of Systems   Constitutional:  Positive for appetite change (decreased), chills, fatigue and fever.   HENT:  Negative for congestion, sore throat and trouble swallowing.    Eyes:  Negative for visual disturbance.   Respiratory:  Positive for shortness of breath (intermittent). Negative for cough and wheezing.    Cardiovascular:  Negative for chest pain, palpitations and leg swelling.   Gastrointestinal:  Positive for abdominal pain, diarrhea, nausea and vomiting. Negative for blood in stool.   Genitourinary:  Negative for difficulty urinating, dysuria and hematuria.   Musculoskeletal:  Positive for back pain.   Skin:  Negative for rash.   Neurological:  Positive for light-headedness. Negative for dizziness and headaches.   Psychiatric/Behavioral:  Negative for confusion.    Objective:     Vital Signs (Most Recent):  Temp: 98.9 °F (37.2 °C) (08/17/22 0042)  Pulse: 98 (08/17/22 0042)  Resp: 18 (08/17/22 0042)  BP: 122/75 (08/17/22 0042)  SpO2: (!) 93 % (08/17/22 0042)   Vital Signs (24h Range):  Temp:  [98.9 °F (37.2 °C)-100.8 °F (38.2 °C)] 98.9 °F (37.2 °C)  Pulse:  [] 98  Resp:  [16-22] 18  SpO2:  [93 %-98 %] 93 %  BP: (112-142)/(63-84) 122/75     Weight: 92.5 kg (204 lb)  Body mass index is 31.95 kg/m².    Physical Exam  Vitals and nursing note reviewed.   Constitutional:       General: She is in acute distress (mild).      Appearance: Normal appearance. She is normal weight.   HENT:      Head: Normocephalic and atraumatic.      Mouth/Throat:      Mouth: Mucous membranes are moist.      Pharynx: Oropharynx is clear.   Eyes:      Extraocular Movements: Extraocular movements intact.      Pupils: Pupils are equal, round, and reactive to light.   Cardiovascular:      Rate and Rhythm:  Regular rhythm. Tachycardia present.      Pulses: Normal pulses.      Heart sounds: Normal heart sounds.   Pulmonary:      Effort: Pulmonary effort is normal. No respiratory distress.      Breath sounds: Decreased breath sounds present. No wheezing.   Abdominal:      General: Abdomen is flat. Bowel sounds are normal. There is no distension.      Palpations: Abdomen is soft.      Tenderness: There is generalized abdominal tenderness and tenderness in the epigastric area, left upper quadrant and left lower quadrant. There is left CVA tenderness. There is no guarding or rebound.      Comments: Healing incision sites without drainage.    Musculoskeletal:         General: Normal range of motion.      Cervical back: Normal range of motion and neck supple.   Skin:     General: Skin is warm.      Capillary Refill: Capillary refill takes 2 to 3 seconds.   Neurological:      General: No focal deficit present.      Mental Status: She is alert and oriented to person, place, and time. Mental status is at baseline.   Psychiatric:         Mood and Affect: Mood normal.         Behavior: Behavior normal.         CRANIAL NERVES     CN III, IV, VI   Pupils are equal, round, and reactive to light.     Significant Labs: All pertinent labs within the past 24 hours have been reviewed.  CBC:   Recent Labs   Lab 08/16/22  1641   WBC 17.11*   HGB 10.8*   HCT 33.0*        CMP:   Recent Labs   Lab 08/16/22  1641      K 3.4*   CL 96   CO2 25   GLU 98   BUN 8   CREATININE 0.7   CALCIUM 9.2   PROT 8.3   ALBUMIN 2.8*   BILITOT 0.9   ALKPHOS 274*   AST 94*   *   ANIONGAP 17*     Lactic Acid:   Recent Labs   Lab 08/16/22  1641 08/16/22  1945   LACTATE 1.3 1.3     Lipase:   Recent Labs   Lab 08/16/22  1641   LIPASE 118*     Urine Studies:   Recent Labs   Lab 08/16/22  1656   COLORU Brown*   APPEARANCEUA Hazy*   PHUR >8.0*   SPECGRAV 1.020   PROTEINUA 2+*   GLUCUA Negative   KETONESU Negative   BILIRUBINUA 1+*   OCCULTUA 3+*    NITRITE Negative   UROBILINOGEN >=8.0*   LEUKOCYTESUR Negative   RBCUA >100*   WBCUA 20*   BACTERIA Few*   HYALINECASTS 0       Significant Imaging: I have reviewed all pertinent imaging results/findings within the past 24 hours.    CT abdomen:  Fat stranding, intra-abdominal fluid with small amount of free fluid in the dependent pelvis and scattered collections of fluid elsewhere.  Decrease of the fluid in the gallbladder fossa compared to the prior MRI of 08/09/2022 and overall the fluid collections appear mildly decreased as well.  Although findings could be infectious/inflammatory and phlegmon no walled-off drainable appearing abscess collection is seen at this time.  Interval insertion of biliary stent since the prior MRCP 08/09/2022.  Also with some of the collections peripancreatic pancreatitis primary or secondary could also be present.       CXR:  Left pleural effusion and left basilar consolidation which may represent pneumonia.

## 2022-08-17 NOTE — CARE UPDATE
08/17/22 1012   Patient Assessment/Suction   Level of Consciousness (AVPU) alert   Respiratory Effort Normal;Unlabored   Expansion/Accessory Muscles/Retractions no use of accessory muscles;no retractions;expansion symmetric   All Lung Fields Breath Sounds clear;equal bilaterally   Rhythm/Pattern, Respiratory unlabored;pattern regular;depth regular   Cough Frequency infrequent   Cough Type nonproductive   PRE-TX-O2   O2 Device (Oxygen Therapy) room air   SpO2 96 %   Pulse Oximetry Type Intermittent   $ Pulse Oximetry - Multiple Charge Pulse Oximetry - Multiple   Pulse 83   Resp 18   Positioning HOB elevated 45 degrees   Aerosol Therapy   $ Aerosol Therapy Charges PRN treatment not required   Respiratory Treatment Status (SVN) PRN treatment not required

## 2022-08-17 NOTE — HPI
Mena Ovalle is a 26 y.o. y.o. female who presented to the ED with progressively worsening abdominal pain. Patient was hospitalized from 8/7 to 8/12 where she had recent lap cindy on 8/8 with Dr. Mendoza and required ERCP with stent placement on 8/10 with Dr. Jessica. She reports worsening abdominal pain since surgery and fever x 2 days. She states the constant abdominal pain is mostly central and left sided. Pain is described as an aching sensation that radiates to her back. Denies alleviating or exacerbating factors. Pain was 8/10 at its worst and is currently 6/10. She also reports chills, fatigue, intermittent SOB, n/v/d, and light headedness. She denies CP, cough, blood in stools, and changes in urination. No drainage from incision sites. s. She has only been able to eat applesauce the past two days and has been taking Zofran with minimal relief of her nausea. Patient is currently on stool softeners and reports diarrhea today. She is currently menstruating. ED workup was significant for elevated lipase, transaminates, elevated WBC, mild hypokalemia, mild anemia, and signs of sepsis. Blood cultures and lactate were obtained in the ED. Hospital Medicine was consulted for further workup and management of the patient.

## 2022-08-17 NOTE — PROGRESS NOTES
[facility] [place] [facility] [place]  General Surgery  Progress Note    Subjective:     Interval History:  26-year-old female with a recent history of cholecystectomy for acute cholecystitis, choledocholithiasis status post ERCP, sphincterotomy and stent placement.  She was readmitted yesterday with increasing left upper quadrant pain radiating to her back associated with nausea and fever to 101.  Bilirubin found to be normal at 0.9.  CT scan showed no biliary dilatation.  She has scattered intra-abdominal fluid most dense along the anterior surface of the pancreas without any obvious formed pseudocyst or abscess.  Pancreas did not have any obvious ductal dilatation.  The fluid in the gallbladder fossa is less compared to previous imaging.  Lipase 118. White count 17.   Chest x-ray - There is a small left pleural effusion and moderate left basilar consolidation which may represent pneumonia.  Urine culture pending.  Patient is awake and alert.  She is breathing comfortably.  She states pain is still significant.  Nausea little improved.  She is passing flatus.  Feeling hungry today.  No subjective fevers or chills.  T-max 100.8° at 10:00 last night.       Post-Op Info:  * No surgery found *          Medications:  Continuous Infusions:   lactated ringers 150 mL/hr at 08/16/22 2201     Scheduled Meds:   piperacillin-tazobactam (ZOSYN) IVPB  4.5 g Intravenous Q8H    senna-docusate 8.6-50 mg  1 tablet Oral BID    vancomycin (VANCOCIN) IVPB  1,500 mg Intravenous Q12H     PRN Meds:acetaminophen, albuterol-ipratropium, calcium gluconate IVPB, calcium gluconate IVPB, calcium gluconate IVPB, dextrose 50%, dextrose 50%, glucagon (human recombinant), glucose, glucose, HYDROmorphone, magnesium oxide, magnesium oxide, magnesium sulfate IVPB, magnesium sulfate IVPB, melatonin, naloxone, ondansetron, potassium bicarbonate, potassium bicarbonate, potassium bicarbonate, potassium chloride **AND** potassium chloride **AND** potassium  chloride, sodium chloride 0.9%, sodium phosphate IVPB, sodium phosphate IVPB, sodium phosphate IVPB, Pharmacy to dose Vancomycin consult **AND** vancomycin - pharmacy to dose     Objective:     Vital Signs (Most Recent):  Temp: 99.4 °F (37.4 °C) (08/17/22 1227)  Pulse: 89 (08/17/22 1227)  Resp: 16 (08/17/22 1420)  BP: 131/82 (08/17/22 1227)  SpO2: 97 % (08/17/22 1227) Vital Signs (24h Range):  Temp:  [98.9 °F (37.2 °C)-100.8 °F (38.2 °C)] 99.4 °F (37.4 °C)  Pulse:  [82-99] 89  Resp:  [15-20] 16  SpO2:  [93 %-97 %] 97 %  BP: (116-138)/(63-82) 131/82       Intake/Output Summary (Last 24 hours) at 8/17/2022 1723  Last data filed at 8/17/2022 1505  Gross per 24 hour   Intake 3814.31 ml   Output 1425 ml   Net 2389.31 ml       Physical Exam  Pulmonary:      Effort: Pulmonary effort is normal. No respiratory distress.   Abdominal:      General: There is no distension.      Palpations: Abdomen is soft.      Tenderness: There is abdominal tenderness in the epigastric area, periumbilical area and left upper quadrant. There is guarding.      Comments: TTP along the upper abdomen worse in the epigastrium and LUQ   Neurological:      Mental Status: She is alert and oriented to person, place, and time.         Significant Labs:  CBC:   Recent Labs   Lab 08/17/22 0420   WBC 17.72*   RBC 3.37*   HGB 9.2*   HCT 27.8*      MCV 83   MCH 27.3   MCHC 33.1     CMP:   Recent Labs   Lab 08/17/22 0420      CALCIUM 8.3*   ALBUMIN 2.1*   PROT 6.2      K 3.3*   CO2 24   CL 99   BUN 6   CREATININE 0.7   ALKPHOS 190*   ALT 79*   AST 39   BILITOT 0.7     Lipase:   Recent Labs   Lab 08/17/22 0420   LIPASE 104*       Significant Diagnostics:  I have reviewed all pertinent imaging results/findings within the past 24 hours.    Assessment/Plan:     Active Diagnoses:    Diagnosis Date Noted POA    PRINCIPAL PROBLEM:  Severe sepsis [A41.9, R65.20] 08/16/2022 Yes    PNA (pneumonia) [J18.9] 08/17/2022 Yes    Hypokalemia [E87.6]  08/17/2022 Yes    Post-operative complication [T81.9XXA] 08/16/2022 Yes    Acute pancreatitis [K85.90] 08/16/2022 Yes    Transaminitis [R74.01] 08/08/2022 Yes      Problems Resolved During this Admission:     -patient readmitted with symptoms and imaging consistent with pancreatitis. CT evidence of pancreatitis with peripancreatic fluid.  Does not appear to have any mature pseudocyst or abscess.  No indication for IR or surgical drainage as of now.  Bilirubin is normal.  Stent appears to be appropriately positioned.  There is no biliary dilatation on CT.  I have low suspicion for stent occlusion or biliary leak.    -Patient also had some evidence of possible pneumonia.  Urinalysis and urine culture pending.  She is on Zosyn and vanc.    -Would recommend supportive care as of now.  If continues to run a fever with similar exam, would repeat CT scan to evaluate for possible maturing pseudocyst or abscess.  Okay for diet from surgery standpoint.      Virgil Mendoza III, MD  General Surgery  [facility]

## 2022-08-17 NOTE — ASSESSMENT & PLAN NOTE
"This patient does have evidence of infective focus  My overall impression is sepsis. Vital signs were reviewed and noted in progress note.  Antibiotics given-   Antibiotics (From admission, onward)            Start     Stop Route Frequency Ordered    08/17/22 0630  vancomycin 1.5 g in dextrose 5 % 250 mL IVPB (ready to mix)         -- IV Every 12 hours (non-standard times) 08/16/22 2208    08/17/22 0130  piperacillin-tazobactam 4.5 g in dextrose 5 % 100 mL IVPB (ready to mix system)         -- IV Every 8 hours (non-standard times) 08/16/22 2158 08/16/22 2158  vancomycin - pharmacy to dose  (vancomycin IVPB)        "And" Linked Group Details    -- IV pharmacy to manage frequency 08/16/22 2158        Cultures were taken-   Microbiology Results (last 7 days)     Procedure Component Value Units Date/Time    Blood culture x two cultures. Draw prior to antibiotics. [[id number]] Collected: 08/16/22 1642    Order Status: Sent Specimen: Blood from Antecubital, Left Arm Updated: 08/16/22 2221    Blood culture x two cultures. Draw prior to antibiotics. [[id number]] Collected: 08/16/22 1641    Order Status: Sent Specimen: Blood from Antecubital, Right Arm Updated: 08/16/22 2221    Urine culture [[id number]] Collected: 08/16/22 1656    Order Status: No result Specimen: Urine Updated: 08/16/22 1734        Latest lactate reviewed, they are-  Recent Labs   Lab 08/16/22  1641 08/16/22  1945   LACTATE 1.3 1.3       Organ dysfunction indicated by Acute liver injury  Source- intra-abd vs PNA    Source control Achieved by- IV abx; general surgery consult      "

## 2022-08-17 NOTE — PLAN OF CARE
[facility]  Initial Discharge Assessment       Primary Care Provider: Francisco Camargo III, MD    Admission Diagnosis: Tachycardia [R00.0]  Severe sepsis [A41.9, R65.20]    Admission Date: 8/16/2022  Expected Discharge Date:     Discharge Barriers Identified: None     Cm completed the assessment with pt at bedside. Demographic, PCP and insurance is current on face sheet. Pt denies diabetes, dialysis and coumadin. Denies hh/dme.  Denies POA/LW.  Disposition:  Pt will discharge to home. Spouse will provide transportation on discharge. No needs at this time.    Payor: MEDICAID / Plan: [place] [employer] / Product Type: Managed Medicaid /     Extended Emergency Contact Information  Primary Emergency Contact: Thien Ovalle  Address: [address], [place] [address] [place] [address]  Mobile Phone: [phone]  Relation: Spouse  Secondary Emergency Contact: Annette Nichols  Address: [address], [place] [address] [place] [address]  Home Phone: [phone]  Work Phone: [phone]  Mobile Phone: [phone]  Relation: Mother   needed? No    Discharge Plan A: Home with family  Discharge Plan B: Home with family      [id number] - [place], [place] - [address] AT [facility] &   [address]  [facility] [address]  Phone: [phone] Fax: [phone]      Initial Assessment (most recent)     Adult Discharge Assessment - 08/17/22 1500        Discharge Assessment    Assessment Type Discharge Planning Assessment     Confirmed/corrected address, phone number and insurance Yes     Confirmed Demographics Correct on Facesheet     Source of Information patient     Lives With spouse     Facility Arrived From: home     Do you expect to return to your current living situation? Yes     Do you have help at home or someone to help you manage your care at home? Yes     Who are your caregiver(s) and their phone number(s)? THIEN  - [phone]     Prior to hospitilization cognitive status: Alert/Oriented     Current cognitive status: Alert/Oriented     Walking or Climbing Stairs Difficulty none     Dressing/Bathing Difficulty none     Equipment Currently Used at Home none     Readmission within 30 days? No     Patient currently being followed by outpatient case management? No     Do you currently have service(s) that help you manage your care at home? No     Do you take prescription medications? Yes     Do you have prescription coverage? Yes     Do you have any problems affording any of your prescribed medications? No     Is the patient taking medications as prescribed? yes     Who is going to help you get home at discharge? SPOUSE-  Thien     How do you get to doctors appointments? car, drives self     Are you on dialysis? No     Do you take coumadin? No     Discharge Plan A Home with family     Discharge Plan B Home with family     DME Needed Upon Discharge  none     Discharge Plan discussed with: Patient     Discharge Barriers Identified None

## 2022-08-17 NOTE — H&P
[facility] Hospital Medicine  History & Physical    Patient Name: Mena Ovalle  MRN: [id number]  Patient Class: OP- Observation  Admission Date: 8/16/2022  Attending Physician: Claudio Alvarez MD   Primary Care Provider: Francisco Camargo III, MD         Patient information was obtained from patient, spouse/SO, past medical records and ER records.     Subjective:     Principal Problem:Severe sepsis    Chief Complaint:   Chief Complaint   Patient presents with    Post-op Problem     Patient states she was just released from gall bladder surgery last Monday was discharged Saturday from [facility] was sent from [facility]'s office for further eval due to fever         HPI: Mena Ovalle is a 26 y.o. y.o. female who presented to the ED with progressively worsening abdominal pain. Patient was hospitalized from 8/7 to 8/12 where she had recent lap cindy on 8/8 with Dr. Mendoza and required ERCP with stent placement on 8/10 with Dr. Jessica. She reports worsening abdominal pain since surgery and fever x 2 days. She states the constant abdominal pain is mostly central and left sided. Pain is described as an aching sensation that radiates to her back. Denies alleviating or exacerbating factors. Pain was 8/10 at its worst and is currently 6/10. She also reports chills, fatigue, intermittent SOB, n/v/d, and light headedness. She denies CP, cough, blood in stools, and changes in urination. No drainage from incision sites. s. She has only been able to eat applesauce the past two days and has been taking Zofran with minimal relief of her nausea. Patient is currently on stool softeners and reports diarrhea today. She is currently menstruating. ED workup was significant for elevated lipase, transaminates, elevated WBC, mild hypokalemia, mild anemia, and signs of sepsis. Blood cultures and lactate were obtained in the ED. Hospital Medicine was consulted for further workup and management of the patient.            Past Medical History:   Diagnosis Date    Abnormal Pap smear of cervix     repeat pap    Bronchitis     COVID 01/18/2022    Family history of pulmonary embolism     MOTHER with MTHFR, PE found after knee replacement.     Iron deficiency anemia 5/17/2018    Pneumonia        Past Surgical History:   Procedure Laterality Date    ERCP N/A 8/10/2022    Procedure: ERCP (ENDOSCOPIC RETROGRADE CHOLANGIOPANCREATOGRAPHY);  Surgeon: Ruddy Jessica III, MD;  [facility];  Service: Endoscopy;  Laterality: N/A;    LAPAROSCOPIC CHOLECYSTECTOMY N/A 8/8/2022    Procedure: CHOLECYSTECTOMY, LAPAROSCOPIC;  Surgeon: Virgil Mendoza III, MD;  [facility];  Service: General;  Laterality: N/A;    WISDOM TOOTH EXTRACTION  11/15/2019       Review of patient's allergies indicates:  No Known Allergies    No current facility-administered medications on file prior to encounter.     Current Outpatient Medications on File Prior to Encounter   Medication Sig    amoxicillin-clavulanate 875-125mg (AUGMENTIN) 875-125 mg per tablet Take 1 tablet by mouth every 12 (twelve) hours. Take with food for 10 days    dicyclomine (BENTYL) 10 MG capsule Take 1 capsule (10 mg total) by mouth 4 (four) times daily as needed (abdominal pain).    docusate sodium (COLACE) 100 MG capsule Take 100 mg by mouth 2 (two) times daily.    gabapentin (NEURONTIN) 100 MG capsule Take 100 mg by mouth 2 (two) times daily. Pt takes only at HS    HYDROcodone-acetaminophen (NORCO) 5-325 mg per tablet Take 1 tablet by mouth every 6 (six) hours as needed for Pain.    ondansetron (ZOFRAN-ODT) 4 MG TbDL Take 2 tablets (8 mg total) by mouth every 8 (eight) hours as needed (nausea/vomiting).    traMADoL (ULTRAM) 50 mg tablet Take 1 tablet (50 mg total) by mouth every 6 (six) hours as needed for Pain.     Family History       Problem Relation (Age of Onset)    Breast cancer Maternal Grandmother    Cervical cancer Maternal Grandmother     Lymphoma Maternal Grandmother    Pulmonary embolism Mother          Tobacco Use    Smoking status: Never Smoker    Smokeless tobacco: Never Used   Substance and Sexual Activity    Alcohol use: Yes    Drug use: No    Sexual activity: Yes     Partners: Male     Review of Systems   Constitutional:  Positive for appetite change (decreased), chills, fatigue and fever.   HENT:  Negative for congestion, sore throat and trouble swallowing.    Eyes:  Negative for visual disturbance.   Respiratory:  Positive for shortness of breath (intermittent). Negative for cough and wheezing.    Cardiovascular:  Negative for chest pain, palpitations and leg swelling.   Gastrointestinal:  Positive for abdominal pain, diarrhea, nausea and vomiting. Negative for blood in stool.   Genitourinary:  Negative for difficulty urinating, dysuria and hematuria.   Musculoskeletal:  Positive for back pain.   Skin:  Negative for rash.   Neurological:  Positive for light-headedness. Negative for dizziness and headaches.   Psychiatric/Behavioral:  Negative for confusion.    Objective:     Vital Signs (Most Recent):  Temp: 98.9 °F (37.2 °C) (08/17/22 0042)  Pulse: 98 (08/17/22 0042)  Resp: 18 (08/17/22 0042)  BP: 122/75 (08/17/22 0042)  SpO2: (!) 93 % (08/17/22 0042)   Vital Signs (24h Range):  Temp:  [98.9 °F (37.2 °C)-100.8 °F (38.2 °C)] 98.9 °F (37.2 °C)  Pulse:  [] 98  Resp:  [16-22] 18  SpO2:  [93 %-98 %] 93 %  BP: (112-142)/(63-84) 122/75     Weight: 92.5 kg (204 lb)  Body mass index is 31.95 kg/m².    Physical Exam  Vitals and nursing note reviewed.   Constitutional:       General: She is in acute distress (mild).      Appearance: Normal appearance. She is normal weight.   HENT:      Head: Normocephalic and atraumatic.      Mouth/Throat:      Mouth: Mucous membranes are moist.      Pharynx: Oropharynx is clear.   Eyes:      Extraocular Movements: Extraocular movements intact.      Pupils: Pupils are equal, round, and reactive to light.    Cardiovascular:      Rate and Rhythm: Regular rhythm. Tachycardia present.      Pulses: Normal pulses.      Heart sounds: Normal heart sounds.   Pulmonary:      Effort: Pulmonary effort is normal. No respiratory distress.      Breath sounds: Decreased breath sounds present. No wheezing.   Abdominal:      General: Abdomen is flat. Bowel sounds are normal. There is no distension.      Palpations: Abdomen is soft.      Tenderness: There is generalized abdominal tenderness and tenderness in the epigastric area, left upper quadrant and left lower quadrant. There is left CVA tenderness. There is no guarding or rebound.      Comments: Healing incision sites without drainage.    Musculoskeletal:         General: Normal range of motion.      Cervical back: Normal range of motion and neck supple.   Skin:     General: Skin is warm.      Capillary Refill: Capillary refill takes 2 to 3 seconds.   Neurological:      General: No focal deficit present.      Mental Status: She is alert and oriented to person, place, and time. Mental status is at baseline.   Psychiatric:         Mood and Affect: Mood normal.         Behavior: Behavior normal.         CRANIAL NERVES     CN III, IV, VI   Pupils are equal, round, and reactive to light.     Significant Labs: All pertinent labs within the past 24 hours have been reviewed.  CBC:   Recent Labs   Lab 08/16/22  1641   WBC 17.11*   HGB 10.8*   HCT 33.0*        CMP:   Recent Labs   Lab 08/16/22  1641      K 3.4*   CL 96   CO2 25   GLU 98   BUN 8   CREATININE 0.7   CALCIUM 9.2   PROT 8.3   ALBUMIN 2.8*   BILITOT 0.9   ALKPHOS 274*   AST 94*   *   ANIONGAP 17*     Lactic Acid:   Recent Labs   Lab 08/16/22  1641 08/16/22  1945   LACTATE 1.3 1.3     Lipase:   Recent Labs   Lab 08/16/22  1641   LIPASE 118*     Urine Studies:   Recent Labs   Lab 08/16/22  1656   COLORU Brown*   APPEARANCEUA Hazy*   PHUR >8.0*   SPECGRAV 1.020   PROTEINUA 2+*   GLUCUA Negative   KETONESU Negative  "  BILIRUBINUA 1+*   OCCULTUA 3+*   NITRITE Negative   UROBILINOGEN >=8.0*   LEUKOCYTESUR Negative   RBCUA >100*   WBCUA 20*   BACTERIA Few*   HYALINECASTS 0       Significant Imaging: I have reviewed all pertinent imaging results/findings within the past 24 hours.    CT abdomen:  Fat stranding, intra-abdominal fluid with small amount of free fluid in the dependent pelvis and scattered collections of fluid elsewhere.  Decrease of the fluid in the gallbladder fossa compared to the prior MRI of 08/09/2022 and overall the fluid collections appear mildly decreased as well.  Although findings could be infectious/inflammatory and phlegmon no walled-off drainable appearing abscess collection is seen at this time.  Interval insertion of biliary stent since the prior MRCP 08/09/2022.  Also with some of the collections peripancreatic pancreatitis primary or secondary could also be present.       CXR:  Left pleural effusion and left basilar consolidation which may represent pneumonia.    Assessment/Plan:     * Severe sepsis  This patient does have evidence of infective focus  My overall impression is sepsis. Vital signs were reviewed and noted in progress note.  Antibiotics given-   Antibiotics (From admission, onward)            Start     Stop Route Frequency Ordered    08/17/22 0630  vancomycin 1.5 g in dextrose 5 % 250 mL IVPB (ready to mix)         -- IV Every 12 hours (non-standard times) 08/16/22 2208    08/17/22 0130  piperacillin-tazobactam 4.5 g in dextrose 5 % 100 mL IVPB (ready to mix system)         -- IV Every 8 hours (non-standard times) 08/16/22 2158    08/16/22 2158  vancomycin - pharmacy to dose  (vancomycin IVPB)        "And" Linked Group Details    -- IV pharmacy to manage frequency 08/16/22 2158        Cultures were taken-   Microbiology Results (last 7 days)     Procedure Component Value Units Date/Time    Blood culture x two cultures. Draw prior to antibiotics. [[id number]] Collected: 08/16/22 1642    Order " Status: Sent Specimen: Blood from Antecubital, Left Arm Updated: 08/16/22 2221    Blood culture x two cultures. Draw prior to antibiotics. [[id number]] Collected: 08/16/22 1641    Order Status: Sent Specimen: Blood from Antecubital, Right Arm Updated: 08/16/22 2221    Urine culture [[id number]] Collected: 08/16/22 1656    Order Status: No result Specimen: Urine Updated: 08/16/22 1734        Latest lactate reviewed, they are-  Recent Labs   Lab 08/16/22 1641 08/16/22  1945   LACTATE 1.3 1.3       Organ dysfunction indicated by Acute liver injury  Source- intra-abd vs PNA    Source control Achieved by- IV abx; general surgery consult        Post-operative complication  Acute:  - s/p lap cindy 8/8 with Dr. Mendoza  - s/p ERCP with stent placement 8/10  - with continued abdominal pain  - general surgery consulted: appreciate recommendations  - NPO  - IV abx  - IVFH  - prn pain medication  - prn antiemetics  - CT abdomen:  Fat stranding, intra-abdominal fluid with small amount of free fluid in the dependent pelvis and scattered collections of fluid elsewhere.  Decrease of the fluid in the gallbladder fossa compared to the prior MRI of 08/09/2022 and overall the fluid collections appear mildly decreased as well.  Although findings could be infectious/inflammatory and phlegmon no walled-off drainable appearing abscess collection is seen at this time.  Interval insertion of biliary stent since the prior MRCP 08/09/2022.  Also with some of the collections peripancreatic pancreatitis primary or secondary could also be present.   - CXR with concerns for PNA    Acute pancreatitis  Acute:  - IVFH  - initial lipase: 118        Hypokalemia  Acute:  - mild: K 3.4  - on tele  - prn replacements ordered  - monitor CMP      PNA (pneumonia)  Acute:  - concerns for PAN on CXR:  - IV abx  - respiratory culture ordered  - prn bronchodilators       Transaminitis  Noted.   Monitor LFT closely       VTE Risk Mitigation (From admission,  onward)         Ordered     IP VTE HIGH RISK PATIENT  Once         08/16/22 2158     Place sequential compression device  Until discontinued         08/16/22 2158                   Trish Mejia PA-C  Department of Hospital Medicine   [facility]

## 2022-08-17 NOTE — PROGRESS NOTES
"Pharmacist Renal Dose Adjustment Note    Mena Ovalle is a 26 y.o. female being treated with the medication zosyn    Patient Data:    Vital Signs (Most Recent):  Temp: (!) 100.8 °F (38.2 °C) (08/16/22 2156)  Pulse: 99 (08/16/22 2156)  Resp: 20 (08/16/22 2156)  BP: 138/81 (08/16/22 2156)  SpO2: (!) 94 % (08/16/22 2156)   Vital Signs (72h Range):  Temp:  [100.4 °F (38 °C)-100.8 °F (38.2 °C)]   Pulse:  []   Resp:  [17-22]   BP: (112-142)/(63-84)   SpO2:  [93 %-98 %]      Recent Labs   Lab 08/10/22  0442 08/12/22  0902 08/16/22  1641   CREATININE 0.7 0.7 0.7     Serum creatinine: 0.7 mg/dL 08/16/22 1641  Estimated creatinine clearance: 139.8 mL/min    Patient is on Zosyn 3.375 g IV Q8h over 30 minutes (intermittent infusion). Due to the patient's current diagnosis, zosyn via extended infusion provides better clinical outcomes regarding decreased mortality and decreased length of stay in comparison to intermittent infusion. Zosyn dose will be adjusted to Zosyn 4.5 g IV Q8h over 4 hours. Per pharmacy protocol, Zosyn can be adjusted automatically. Providers can override adjustment by re-ordering intermittent infusion with "dispense as written" in the administration instructions.     Jose Wild  Pharm.D    [facility]    [phone]      "

## 2022-08-17 NOTE — PLAN OF CARE
Plan of care reviewed with patient. IV fluids / antibiotics administered as per orders. NPO during night. SR on telemetry. Ambulated to restroom , tolerated well. Remains free from falls/injury. Instructed to call for assistance as needed,verbalized understanding. Call light in reach.

## 2022-08-17 NOTE — PLAN OF CARE
POC reviewed with pt, pt verbalizes understanding. AAOx4. Ambulatory standby assist. Mild relief from prn pain medications. Zofran given for nausea. NPO. IVF infusing. NSR on tele [phone]. Q2h rounding done. Bed locked and low, call light in reach.

## 2022-08-17 NOTE — ASSESSMENT & PLAN NOTE
Acute:  - s/p lap cindy 8/8 with Dr. Mendoza  - s/p ERCP with stent placement 8/10  - with continued abdominal pain  - general surgery consulted: appreciate recommendations  - NPO  - IV abx  - IVFH  - prn pain medication  - prn antiemetics  - CT abdomen:  Fat stranding, intra-abdominal fluid with small amount of free fluid in the dependent pelvis and scattered collections of fluid elsewhere.  Decrease of the fluid in the gallbladder fossa compared to the prior MRI of 08/09/2022 and overall the fluid collections appear mildly decreased as well.  Although findings could be infectious/inflammatory and phlegmon no walled-off drainable appearing abscess collection is seen at this time.  Interval insertion of biliary stent since the prior MRCP 08/09/2022.  Also with some of the collections peripancreatic pancreatitis primary or secondary could also be present.   - CXR with concerns for PNA

## 2022-08-17 NOTE — PROGRESS NOTES
Pharmacokinetic Initial Assessment: IV Vancomycin    Assessment/Plan:    Initiate intravenous vancomycin with loading dose of 1750 mg once followed by a maintenance dose of vancomycin 1500mg IV every 12 hours  Desired empiric serum trough concentration is 10 to 15 mcg/mL  Draw vancomycin trough level 60 min prior to fourth dose on 8/18 at approximately 0530  Pharmacy will continue to follow and monitor vancomycin.      Please contact pharmacy at extension [phone] with any questions regarding this assessment.     Thank you for the consult,   Jose Wild       Patient brief summary:  Mena Ovalle is a 26 y.o. female initiated on antimicrobial therapy with IV Vancomycin for treatment of suspected intra-abdominal infection    Drug Allergies:   Review of patient's allergies indicates:  No Known Allergies    Actual Body Weight:   92.5 KG    Renal Function:   Estimated Creatinine Clearance: 139.8 mL/min (based on SCr of 0.7 mg/dL).,     Dialysis Method (if applicable):  N/A    CBC (last 72 hours):  Recent Labs   Lab Result Units 08/16/22  1641   WBC K/uL 17.11*   Hemoglobin g/dL 10.8*   Hematocrit % 33.0*   Platelets K/uL 344   Gran % % 77.9*   Lymph % % 12.2*   Mono % % 7.8   Eosinophil % % 0.6   Basophil % % 0.4   Differential Method  Automated       Metabolic Panel (last 72 hours):  Recent Labs   Lab Result Units 08/16/22  1641 08/16/22  1656   Sodium mmol/L 138  --    Potassium mmol/L 3.4*  --    Chloride mmol/L 96  --    CO2 mmol/L 25  --    Glucose mg/dL 98  --    Glucose, UA   --  Negative   BUN mg/dL 8  --    Creatinine mg/dL 0.7  --    Albumin g/dL 2.8*  --    Total Bilirubin mg/dL 0.9  --    Alkaline Phosphatase U/L 274*  --    AST U/L 94*  --    ALT U/L 121*  --        Drug levels (last 3 results):  No results for input(s): VANCOMYCINRA, VANCORANDOM, VANCOMYCINPE, VANCOPEAK, VANCOMYCINTR, VANCOTROUGH in the last 72 hours.    Microbiologic Results:  Microbiology Results (last 7 days)       Procedure Component  Value Units Date/Time    Urine culture [[id number]] Collected: 08/16/22 1656    Order Status: No result Specimen: Urine Updated: 08/16/22 1734    Blood culture x two cultures. Draw prior to antibiotics. [[id number]] Collected: 08/16/22 1641    Order Status: Sent Specimen: Blood from Antecubital, Right Arm Updated: 08/16/22 1642    Blood culture x two cultures. Draw prior to antibiotics. [[id number]] Collected: 08/16/22 1642    Order Status: Sent Specimen: Blood from Antecubital, Left Arm Updated: 08/16/22 1642

## 2022-08-18 ENCOUNTER — TELEPHONE (OUTPATIENT)
Dept: SURGERY | Facility: CLINIC | Age: 27
End: 2022-08-18
Payer: MEDICAID

## 2022-08-18 LAB
ALBUMIN SERPL BCP-MCNC: 2 G/DL (ref 3.5–5.2)
ALP SERPL-CCNC: 182 U/L (ref 55–135)
ALT SERPL W/O P-5'-P-CCNC: 69 U/L (ref 10–44)
ANION GAP SERPL CALC-SCNC: 13 MMOL/L (ref 8–16)
AST SERPL-CCNC: 34 U/L (ref 10–40)
BACTERIA UR CULT: NORMAL
BACTERIA UR CULT: NORMAL
BASOPHILS # BLD AUTO: 0.07 K/UL (ref 0–0.2)
BASOPHILS NFR BLD: 0.5 % (ref 0–1.9)
BILIRUB SERPL-MCNC: 0.6 MG/DL (ref 0.1–1)
BUN SERPL-MCNC: 6 MG/DL (ref 6–20)
CALCIUM SERPL-MCNC: 8.3 MG/DL (ref 8.7–10.5)
CHLORIDE SERPL-SCNC: 98 MMOL/L (ref 95–110)
CO2 SERPL-SCNC: 25 MMOL/L (ref 23–29)
CREAT SERPL-MCNC: 0.8 MG/DL (ref 0.5–1.4)
DIFFERENTIAL METHOD: ABNORMAL
EOSINOPHIL # BLD AUTO: 0.2 K/UL (ref 0–0.5)
EOSINOPHIL NFR BLD: 1.5 % (ref 0–8)
ERYTHROCYTE [DISTWIDTH] IN BLOOD BY AUTOMATED COUNT: 14.4 % (ref 11.5–14.5)
EST. GFR  (NO RACE VARIABLE): >60 ML/MIN/1.73 M^2
GLUCOSE SERPL-MCNC: 122 MG/DL (ref 70–110)
HCT VFR BLD AUTO: 28.2 % (ref 37–48.5)
HGB BLD-MCNC: 9.3 G/DL (ref 12–16)
IMM GRANULOCYTES # BLD AUTO: 0.16 K/UL (ref 0–0.04)
IMM GRANULOCYTES NFR BLD AUTO: 1.1 % (ref 0–0.5)
LIPASE SERPL-CCNC: 139 U/L (ref 4–60)
LYMPHOCYTES # BLD AUTO: 1.1 K/UL (ref 1–4.8)
LYMPHOCYTES NFR BLD: 7.6 % (ref 18–48)
MAGNESIUM SERPL-MCNC: 1.9 MG/DL (ref 1.6–2.6)
MCH RBC QN AUTO: 27 PG (ref 27–31)
MCHC RBC AUTO-ENTMCNC: 33 G/DL (ref 32–36)
MCV RBC AUTO: 82 FL (ref 82–98)
MONOCYTES # BLD AUTO: 1.3 K/UL (ref 0.3–1)
MONOCYTES NFR BLD: 9.2 % (ref 4–15)
NEUTROPHILS # BLD AUTO: 11.5 K/UL (ref 1.8–7.7)
NEUTROPHILS NFR BLD: 80.1 % (ref 38–73)
NRBC BLD-RTO: 0 /100 WBC
PHOSPHATE SERPL-MCNC: 3.9 MG/DL (ref 2.7–4.5)
PLATELET # BLD AUTO: 416 K/UL (ref 150–450)
PMV BLD AUTO: 11.4 FL (ref 9.2–12.9)
POTASSIUM SERPL-SCNC: 3.1 MMOL/L (ref 3.5–5.1)
PROT SERPL-MCNC: 6.2 G/DL (ref 6–8.4)
RBC # BLD AUTO: 3.45 M/UL (ref 4–5.4)
SODIUM SERPL-SCNC: 136 MMOL/L (ref 136–145)
VANCOMYCIN TROUGH SERPL-MCNC: 10.6 UG/ML (ref 10–22)
WBC # BLD AUTO: 14.39 K/UL (ref 3.9–12.7)

## 2022-08-18 PROCEDURE — 63600175 PHARM REV CODE 636 W HCPCS

## 2022-08-18 PROCEDURE — 25000003 PHARM REV CODE 250: Performed by: HOSPITALIST

## 2022-08-18 PROCEDURE — 94799 UNLISTED PULMONARY SVC/PX: CPT

## 2022-08-18 PROCEDURE — 94761 N-INVAS EAR/PLS OXIMETRY MLT: CPT

## 2022-08-18 PROCEDURE — 99900035 HC TECH TIME PER 15 MIN (STAT)

## 2022-08-18 PROCEDURE — 83690 ASSAY OF LIPASE: CPT | Performed by: HOSPITALIST

## 2022-08-18 PROCEDURE — 63600175 PHARM REV CODE 636 W HCPCS: Performed by: HOSPITALIST

## 2022-08-18 PROCEDURE — 84100 ASSAY OF PHOSPHORUS: CPT

## 2022-08-18 PROCEDURE — 25000003 PHARM REV CODE 250

## 2022-08-18 PROCEDURE — 80202 ASSAY OF VANCOMYCIN: CPT | Performed by: HOSPITALIST

## 2022-08-18 PROCEDURE — 83735 ASSAY OF MAGNESIUM: CPT

## 2022-08-18 PROCEDURE — 80053 COMPREHEN METABOLIC PANEL: CPT

## 2022-08-18 PROCEDURE — 12000002 HC ACUTE/MED SURGE SEMI-PRIVATE ROOM

## 2022-08-18 PROCEDURE — 85025 COMPLETE CBC W/AUTO DIFF WBC: CPT

## 2022-08-18 RX ORDER — ENOXAPARIN SODIUM 100 MG/ML
40 INJECTION SUBCUTANEOUS EVERY 24 HOURS
Status: DISCONTINUED | OUTPATIENT
Start: 2022-08-18 | End: 2022-08-22 | Stop reason: HOSPADM

## 2022-08-18 RX ADMIN — ONDANSETRON 8 MG: 2 INJECTION INTRAMUSCULAR; INTRAVENOUS at 02:08

## 2022-08-18 RX ADMIN — PIPERACILLIN SODIUM AND TAZOBACTAM SODIUM 4.5 G: 4; .5 INJECTION, POWDER, LYOPHILIZED, FOR SOLUTION INTRAVENOUS at 01:08

## 2022-08-18 RX ADMIN — HYDROMORPHONE HYDROCHLORIDE 1 MG: 1 INJECTION, SOLUTION INTRAMUSCULAR; INTRAVENOUS; SUBCUTANEOUS at 10:08

## 2022-08-18 RX ADMIN — PIPERACILLIN SODIUM AND TAZOBACTAM SODIUM 4.5 G: 4; .5 INJECTION, POWDER, LYOPHILIZED, FOR SOLUTION INTRAVENOUS at 09:08

## 2022-08-18 RX ADMIN — VANCOMYCIN HYDROCHLORIDE 1500 MG: 1.5 INJECTION, POWDER, LYOPHILIZED, FOR SOLUTION INTRAVENOUS at 06:08

## 2022-08-18 RX ADMIN — DOCUSATE SODIUM AND SENNOSIDES 1 TABLET: 8.6; 5 TABLET, FILM COATED ORAL at 08:08

## 2022-08-18 RX ADMIN — HYDROMORPHONE HYDROCHLORIDE 1 MG: 1 INJECTION, SOLUTION INTRAMUSCULAR; INTRAVENOUS; SUBCUTANEOUS at 02:08

## 2022-08-18 RX ADMIN — DOCUSATE SODIUM AND SENNOSIDES 1 TABLET: 8.6; 5 TABLET, FILM COATED ORAL at 09:08

## 2022-08-18 RX ADMIN — HYDROMORPHONE HYDROCHLORIDE 1 MG: 1 INJECTION, SOLUTION INTRAMUSCULAR; INTRAVENOUS; SUBCUTANEOUS at 06:08

## 2022-08-18 RX ADMIN — SODIUM CHLORIDE, SODIUM LACTATE, POTASSIUM CHLORIDE, AND CALCIUM CHLORIDE: .6; .31; .03; .02 INJECTION, SOLUTION INTRAVENOUS at 01:08

## 2022-08-18 RX ADMIN — SODIUM CHLORIDE, SODIUM LACTATE, POTASSIUM CHLORIDE, AND CALCIUM CHLORIDE: .6; .31; .03; .02 INJECTION, SOLUTION INTRAVENOUS at 09:08

## 2022-08-18 RX ADMIN — ONDANSETRON 8 MG: 2 INJECTION INTRAMUSCULAR; INTRAVENOUS at 09:08

## 2022-08-18 RX ADMIN — HYDROMORPHONE HYDROCHLORIDE 1 MG: 1 INJECTION, SOLUTION INTRAMUSCULAR; INTRAVENOUS; SUBCUTANEOUS at 07:08

## 2022-08-18 RX ADMIN — HYDROMORPHONE HYDROCHLORIDE 1 MG: 1 INJECTION, SOLUTION INTRAMUSCULAR; INTRAVENOUS; SUBCUTANEOUS at 11:08

## 2022-08-18 RX ADMIN — VANCOMYCIN HYDROCHLORIDE 1750 MG: 1 INJECTION, POWDER, LYOPHILIZED, FOR SOLUTION INTRAVENOUS at 06:08

## 2022-08-18 RX ADMIN — PIPERACILLIN SODIUM AND TAZOBACTAM SODIUM 4.5 G: 4; .5 INJECTION, POWDER, LYOPHILIZED, FOR SOLUTION INTRAVENOUS at 05:08

## 2022-08-18 NOTE — ASSESSMENT & PLAN NOTE
Etiology likely potentially related to recent surgery.  Bilirubin remains stable.  Continue to monitor.

## 2022-08-18 NOTE — TELEPHONE ENCOUNTER
Spoke with patient confirmed Leave form received will complete and return to [facility]

## 2022-08-18 NOTE — PROGRESS NOTES
[facility] Hospital Medicine  Progress Note    Patient Name: Mena Ovalle  MRN: [id number]  Patient Class: IP- Inpatient   Admission Date: 8/16/2022  Length of Stay: 0 days  Attending Physician: Claudio Alvarez MD  Primary Care Provider: Francisco Camargo III, MD        Subjective:     Principal Problem:Severe sepsis        HPI:  Mena Ovalle is a 26 y.o. y.o. female who presented to the ED with progressively worsening abdominal pain. Patient was hospitalized from 8/7 to 8/12 where she had recent lap cindy on 8/8 with Dr. Mendoza and required ERCP with stent placement on 8/10 with Dr. Jessica. She reports worsening abdominal pain since surgery and fever x 2 days. She states the constant abdominal pain is mostly central and left sided. Pain is described as an aching sensation that radiates to her back. Denies alleviating or exacerbating factors. Pain was 8/10 at its worst and is currently 6/10. She also reports chills, fatigue, intermittent SOB, n/v/d, and light headedness. She denies CP, cough, blood in stools, and changes in urination. No drainage from incision sites. s. She has only been able to eat applesauce the past two days and has been taking Zofran with minimal relief of her nausea. Patient is currently on stool softeners and reports diarrhea today. She is currently menstruating. ED workup was significant for elevated lipase, transaminates, elevated WBC, mild hypokalemia, mild anemia, and signs of sepsis. Blood cultures and lactate were obtained in the ED. Hospital Medicine was consulted for further workup and management of the patient.           Overview/Hospital Course:  No notes on file    Interval History:  Patient seen and examined.  Continues to have significant abdominal pain requiring high dose of IV pain medication.  General surgery consulted.  Patient desiring to eat.  Plan of care discussed with the patient in detail.    Review of Systems   Constitutional:   Negative for fatigue.   Respiratory:  Negative for cough and shortness of breath.    Cardiovascular:  Negative for chest pain and leg swelling.   Gastrointestinal:  Positive for abdominal pain, nausea and vomiting.   Neurological:  Negative for weakness.   Psychiatric/Behavioral:  Negative for confusion.    All other systems reviewed and are negative.  Objective:     Vital Signs (Most Recent):  Temp: 98.8 °F (37.1 °C) (08/17/22 2025)  Pulse: 95 (08/17/22 2025)  Resp: 16 (08/17/22 2025)  BP: 128/82 (08/17/22 2025)  SpO2: (!) 94 % (08/17/22 2025)   Vital Signs (24h Range):  Temp:  [98.3 °F (36.8 °C)-99.4 °F (37.4 °C)] 98.8 °F (37.1 °C)  Pulse:  [83-98] 95  Resp:  [15-18] 16  SpO2:  [93 %-98 %] 94 %  BP: (122-131)/(75-82) 128/82     Weight: 92.5 kg (204 lb)  Body mass index is 31.95 kg/m².    Intake/Output Summary (Last 24 hours) at 8/17/2022 2159  Last data filed at 8/17/2022 1808  Gross per 24 hour   Intake 2939.31 ml   Output 1625 ml   Net 1314.31 ml      Physical Exam  Vitals and nursing note reviewed.   Constitutional:       General: She is not in acute distress.  Eyes:      Pupils: Pupils are equal, round, and reactive to light.   Cardiovascular:      Rate and Rhythm: Normal rate and regular rhythm.      Heart sounds: No murmur heard.  Pulmonary:      Effort: Pulmonary effort is normal.      Breath sounds: Normal breath sounds.   Abdominal:      General: There is distension.      Palpations: Abdomen is soft.      Tenderness: There is abdominal tenderness. There is guarding. There is no rebound.   Skin:     Coloration: Skin is not pale.      Findings: No rash.   Neurological:      Mental Status: She is alert and oriented to person, place, and time.       Significant Labs: All pertinent labs within the past 24 hours have been reviewed.    Significant Imaging: I have reviewed all pertinent imaging results/findings within the past 24 hours.      Assessment/Plan:      * Severe sepsis  This patient does have evidence  "of infective focus  My overall impression is sepsis. Vital signs were reviewed and noted in progress note.    CT Abdomen- Fat stranding, intra-abdominal fluid with small amount of free fluid in the dependent pelvis and scattered collections of fluid elsewhere.  Decrease of the fluid in the gallbladder fossa compared to the prior MRI of 08/09/2022 and overall the fluid collections appear mildly decreased as well.  Although findings could be infectious/inflammatory and phlegmon no walled-off drainable appearing abscess collection is seen at this time.  Interval insertion of biliary stent since the prior MRCP 08/09/2022.  Also with some of the collections peripancreatic pancreatitis primary or secondary could also be present.     Antibiotics given-   Antibiotics (From admission, onward)            Start     Stop Route Frequency Ordered    08/17/22 0630  vancomycin 1.5 g in dextrose 5 % 250 mL IVPB (ready to mix)         -- IV Every 12 hours (non-standard times) 08/16/22 2208    08/17/22 0130  piperacillin-tazobactam 4.5 g in dextrose 5 % 100 mL IVPB (ready to mix system)         -- IV Every 8 hours (non-standard times) 08/16/22 2158 08/16/22 2158  vancomycin - pharmacy to dose  (vancomycin IVPB)        "And" Linked Group Details    -- IV pharmacy to manage frequency 08/16/22 2158        Cultures were taken-   Microbiology Results (last 7 days)     Procedure Component Value Units Date/Time    Blood culture x two cultures. Draw prior to antibiotics. [[id number]] Collected: 08/16/22 1642    Order Status: Completed Specimen: Blood from Antecubital, Left Arm Updated: 08/17/22 0715     Blood Culture, Routine No Growth to date    Narrative:      Aerobic and anaerobic    Blood culture x two cultures. Draw prior to antibiotics. [[id number]] Collected: 08/16/22 1641    Order Status: Completed Specimen: Blood from Antecubital, Right Arm Updated: 08/17/22 0715     Blood Culture, Routine No Growth to date    Narrative:      " Aerobic and anaerobic    Culture, Respiratory with Gram Stain [[id number]]     Order Status: No result Specimen: Respiratory     Urine culture [[id number]] Collected: 08/16/22 1656    Order Status: No result Specimen: Urine Updated: 08/16/22 1734        Latest lactate reviewed, they are-  Recent Labs   Lab 08/16/22  1641 08/16/22  1945   LACTATE 1.3 1.3       Organ dysfunction indicated by Acute liver injury  Source- intra-abd vs PNA (intra-abdominal more likely given patient recent history.)    Source control Achieved by- IV abx; general surgery consult        Acute pancreatitis  Lipase is 118.  Continue treatment with IV narcotics and IV antiemetics.  Will continue patient on clear liquid diet and advance slowly given her severe pain.  May require reimaging in 1-2 days the patient condition is not improving.      Hypokalemia  Patient has hypokalemia which is currently uncontrolled. Last electrolytes reviewed- Recent Labs   Lab 08/16/22  1641 08/17/22  0420   K 3.4* 3.3*   . Will replace potassium and monitor electrolytes closely.         Transaminitis  Etiology likely potentially related to recent surgery.  Bilirubin remains stable.  Continue to monitor.        VTE Risk Mitigation (From admission, onward)         Ordered     IP VTE HIGH RISK PATIENT  Once         08/16/22 2158     Place sequential compression device  Until discontinued         08/16/22 2158                Discharge Planning   KRISTY:      Code Status: Full Code   Is the patient medically ready for discharge?:     Reason for patient still in hospital (select all that apply): Treatment  Discharge Plan A: Home with family                  Claudio Alvarez MD  Department of Hospital Medicine   [facility]

## 2022-08-18 NOTE — PROGRESS NOTES
Pharmacokinetic Assessment Follow Up: IV Vancomycin    Vancomycin serum concentration assessment(s):    The trough level was drawn correctly and can be used to guide therapy at this time. The measurement is below the desired definitive target range of 15 to 20 mcg/mL.    Vancomycin Regimen Plan:    Change regimen to Vancomycin 1750 mg IV every 12 hours with next serum trough concentration measured at 1730 prior to 4th dose on 8/19    Drug levels (last 3 results):  Recent Labs   Lab Result Units 08/18/22  0519   Vancomycin-Trough ug/mL 10.6       Pharmacy will continue to follow and monitor vancomycin.    Please contact pharmacy at extension [phone] for questions regarding this assessment.    Thank you for the consult,   Jaquelin Minor       Patient brief summary:  Mena Ovalle is a 26 y.o. female initiated on antimicrobial therapy with IV Vancomycin for treatment of intra-abdominal infection/sepsis    The patient's current regimen is 1500 mg every 12 hours    Drug Allergies:   Review of patient's allergies indicates:  No Known Allergies    Actual Body Weight:   92.5    Renal Function:   Estimated Creatinine Clearance: 122.3 mL/min (based on SCr of 0.8 mg/dL).,     Dialysis Method (if applicable):  N/A    CBC (last 72 hours):  Recent Labs   Lab Result Units 08/16/22 1641 08/17/22 0420 08/18/22  0519   WBC K/uL 17.11* 17.72* 14.39*   Hemoglobin g/dL 10.8* 9.2* 9.3*   Hematocrit % 33.0* 27.8* 28.2*   Platelets K/uL 344 380 416   Gran % % 77.9* 77.8* 80.1*   Lymph % % 12.2* 10.5* 7.6*   Mono % % 7.8 9.1 9.2   Eosinophil % % 0.6 1.0 1.5   Basophil % % 0.4 0.3 0.5   Differential Method  Automated Automated Automated       Metabolic Panel (last 72 hours):  Recent Labs   Lab Result Units 08/16/22  1641 08/16/22  1656 08/17/22  0420 08/18/22  0519   Sodium mmol/L 138  --  137 136   Potassium mmol/L 3.4*  --  3.3* 3.1*   Chloride mmol/L 96  --  99 98   CO2 mmol/L 25  --  24 25   Glucose mg/dL 98  --  101 122*    Glucose, UA   --  Negative  --   --    BUN mg/dL 8  --  6 6   Creatinine mg/dL 0.7  --  0.7 0.8   Albumin g/dL 2.8*  --  2.1* 2.0*   Total Bilirubin mg/dL 0.9  --  0.7 0.6   Alkaline Phosphatase U/L 274*  --  190* 182*   AST U/L 94*  --  39 34   ALT U/L 121*  --  79* 69*   Magnesium mg/dL  --   --  1.8 1.9   Phosphorus mg/dL  --   --  4.2 3.9       Vancomycin Administrations:  vancomycin given in the last 96 hours                     vancomycin 1.5 g in dextrose 5 % 250 mL IVPB (ready to mix) (mg) 1,500 mg New Bag 08/18/22 0612     1,500 mg New Bag 08/17/22 1808     1,500 mg New Bag  0639    vancomycin (VANCOCIN) 1,750 mg in dextrose 5 % 500 mL IVPB (mg) 1,750 mg New Bag 08/16/22 1838                    Microbiologic Results:  Microbiology Results (last 7 days)       Procedure Component Value Units Date/Time    Blood culture x two cultures. Draw prior to antibiotics. [[id number]] Collected: 08/16/22 1642    Order Status: Completed Specimen: Blood from Antecubital, Left Arm Updated: 08/18/22 0613     Blood Culture, Routine No Growth to date      No Growth to date    Narrative:      Aerobic and anaerobic    Blood culture x two cultures. Draw prior to antibiotics. [[id number]] Collected: 08/16/22 1641    Order Status: Completed Specimen: Blood from Antecubital, Right Arm Updated: 08/18/22 0613     Blood Culture, Routine No Growth to date      No Growth to date    Narrative:      Aerobic and anaerobic    Urine culture [[id number]] Collected: 08/16/22 1656    Order Status: Completed Specimen: Urine Updated: 08/18/22 0158     Urine Culture, Routine Multiple organisms isolated. None in predominance.  Repeat if      clinically necessary.    Narrative:      Specimen Source->Urine    Culture, Respiratory with Gram Stain [[id number]]     Order Status: No result Specimen: Respiratory

## 2022-08-18 NOTE — CARE UPDATE
08/18/22 0952   Patient Assessment/Suction   Level of Consciousness (AVPU) alert   Respiratory Effort Normal;Unlabored   Expansion/Accessory Muscles/Retractions no use of accessory muscles;no retractions;expansion symmetric   All Lung Fields Breath Sounds clear   Rhythm/Pattern, Respiratory unlabored;pattern regular   PRE-TX-O2   O2 Device (Oxygen Therapy) room air   SpO2 96 %   Pulse Oximetry Type Intermittent   $ Pulse Oximetry - Multiple Charge Pulse Oximetry - Multiple   Pulse 84   Resp 18   Aerosol Therapy   $ Aerosol Therapy Charges PRN treatment not required   Incentive Spirometer   $ Incentive Spirometer Charges done with encouragement   Administration (IS) instruction provided, initial;mouthpiece utilized;proper technique demonstrated   Number of Repetitions (IS) 5   Level Incentive Spirometer (mL) 1000   Patient Tolerance (IS) good

## 2022-08-18 NOTE — ASSESSMENT & PLAN NOTE
"This patient does have evidence of infective focus  My overall impression is sepsis. Vital signs were reviewed and noted in progress note.    CT Abdomen- Fat stranding, intra-abdominal fluid with small amount of free fluid in the dependent pelvis and scattered collections of fluid elsewhere.  Decrease of the fluid in the gallbladder fossa compared to the prior MRI of 08/09/2022 and overall the fluid collections appear mildly decreased as well.  Although findings could be infectious/inflammatory and phlegmon no walled-off drainable appearing abscess collection is seen at this time.  Interval insertion of biliary stent since the prior MRCP 08/09/2022.  Also with some of the collections peripancreatic pancreatitis primary or secondary could also be present.     Antibiotics given-   Antibiotics (From admission, onward)            Start     Stop Route Frequency Ordered    08/17/22 0630  vancomycin 1.5 g in dextrose 5 % 250 mL IVPB (ready to mix)         -- IV Every 12 hours (non-standard times) 08/16/22 2208    08/17/22 0130  piperacillin-tazobactam 4.5 g in dextrose 5 % 100 mL IVPB (ready to mix system)         -- IV Every 8 hours (non-standard times) 08/16/22 2158 08/16/22 2158  vancomycin - pharmacy to dose  (vancomycin IVPB)        "And" Linked Group Details    -- IV pharmacy to manage frequency 08/16/22 2158        Cultures were taken-   Microbiology Results (last 7 days)     Procedure Component Value Units Date/Time    Blood culture x two cultures. Draw prior to antibiotics. [[id number]] Collected: 08/16/22 1642    Order Status: Completed Specimen: Blood from Antecubital, Left Arm Updated: 08/17/22 0715     Blood Culture, Routine No Growth to date    Narrative:      Aerobic and anaerobic    Blood culture x two cultures. Draw prior to antibiotics. [[id number]] Collected: 08/16/22 1641    Order Status: Completed Specimen: Blood from Antecubital, Right Arm Updated: 08/17/22 0715     Blood Culture, Routine No Growth " to date    Narrative:      Aerobic and anaerobic    Culture, Respiratory with Gram Stain [[id number]]     Order Status: No result Specimen: Respiratory     Urine culture [[id number]] Collected: 08/16/22 1656    Order Status: No result Specimen: Urine Updated: 08/16/22 1734        Latest lactate reviewed, they are-  Recent Labs   Lab 08/16/22  1641 08/16/22  1945   LACTATE 1.3 1.3       Organ dysfunction indicated by Acute liver injury  Source- intra-abd vs PNA (intra-abdominal more likely given patient recent history.)    Source control Achieved by- IV abx; general surgery consult

## 2022-08-18 NOTE — ASSESSMENT & PLAN NOTE
Acute:  - s/p lap cindy 8/8 with Dr. Mendoza  - s/p ERCP with stent placement 8/10  - with continued abdominal pain  - general surgery consulted: appreciate recommendations  - NPO  - IV abx  - IVFH  - prn pain medication  - prn antiemetics  - CT abdomen:    - CXR with concerns for PNA

## 2022-08-18 NOTE — SUBJECTIVE & OBJECTIVE
Interval History:  Patient seen and examined.  Continues to have significant abdominal pain requiring high dose of IV pain medication.  General surgery consulted.  Patient desiring to eat.  Plan of care discussed with the patient in detail.    Review of Systems   Constitutional:  Negative for fatigue.   Respiratory:  Negative for cough and shortness of breath.    Cardiovascular:  Negative for chest pain and leg swelling.   Gastrointestinal:  Positive for abdominal pain, nausea and vomiting.   Neurological:  Negative for weakness.   Psychiatric/Behavioral:  Negative for confusion.    All other systems reviewed and are negative.  Objective:     Vital Signs (Most Recent):  Temp: 98.8 °F (37.1 °C) (08/17/22 2025)  Pulse: 95 (08/17/22 2025)  Resp: 16 (08/17/22 2025)  BP: 128/82 (08/17/22 2025)  SpO2: (!) 94 % (08/17/22 2025)   Vital Signs (24h Range):  Temp:  [98.3 °F (36.8 °C)-99.4 °F (37.4 °C)] 98.8 °F (37.1 °C)  Pulse:  [83-98] 95  Resp:  [15-18] 16  SpO2:  [93 %-98 %] 94 %  BP: (122-131)/(75-82) 128/82     Weight: 92.5 kg (204 lb)  Body mass index is 31.95 kg/m².    Intake/Output Summary (Last 24 hours) at 8/17/2022 2159  Last data filed at 8/17/2022 1808  Gross per 24 hour   Intake 2939.31 ml   Output 1625 ml   Net 1314.31 ml      Physical Exam  Vitals and nursing note reviewed.   Constitutional:       General: She is not in acute distress.  Eyes:      Pupils: Pupils are equal, round, and reactive to light.   Cardiovascular:      Rate and Rhythm: Normal rate and regular rhythm.      Heart sounds: No murmur heard.  Pulmonary:      Effort: Pulmonary effort is normal.      Breath sounds: Normal breath sounds.   Abdominal:      General: There is distension.      Palpations: Abdomen is soft.      Tenderness: There is abdominal tenderness. There is guarding. There is no rebound.   Skin:     Coloration: Skin is not pale.      Findings: No rash.   Neurological:      Mental Status: She is alert and oriented to person, place,  and time.       Significant Labs: All pertinent labs within the past 24 hours have been reviewed.    Significant Imaging: I have reviewed all pertinent imaging results/findings within the past 24 hours.

## 2022-08-18 NOTE — ASSESSMENT & PLAN NOTE
Lipase is 118.  Continue treatment with IV narcotics and IV antiemetics.  Will continue patient on clear liquid diet and advance slowly given her severe pain.  May require reimaging in 1-2 days the patient condition is not improving.

## 2022-08-18 NOTE — CARE UPDATE
08/17/22 1850   Patient Assessment/Suction   Level of Consciousness (AVPU) alert   Respiratory Effort Normal;Unlabored   Expansion/Accessory Muscles/Retractions no use of accessory muscles   All Lung Fields Breath Sounds clear   Rhythm/Pattern, Respiratory no shortness of breath reported   Cough Frequency no cough   PRE-TX-O2   O2 Device (Oxygen Therapy) room air   SpO2 96 %   Pulse Oximetry Type Intermittent   Aerosol Therapy (SVN) Infant   Respiratory Treatment Status (SVN) PRN treatment, not required

## 2022-08-18 NOTE — TELEPHONE ENCOUNTER
----- Message from Wanda Richey sent at 8/18/2022 11:24 AM CDT -----  Contact: pt at [phone]  Type: Needs Medical Advice  Who Called:  pt    Best Call Back Number: [phone]    Additional Information: pt is calling the office requesting her records be sent over to [employer] for her short term disability. She sates she spoke with Dr. Mendoza yesterday and he told her to just call the office. She says [facility]  need her records from her surgery and everything after that. She is an [employer] employee. Please call back and advise.

## 2022-08-18 NOTE — PLAN OF CARE
POC discussed with patient, verbalized understanding. IV to right ac intact and patent with cdi dressing. IV to left hand remains intact and patent with cdi dressing. Pt states she is passing flatus as well as belching. Tolerating clear liq diet well. Ambulating to restroom and voiding without difficulties. Pain is well controlled with IV pain meds. Pain states nausea is mild and required zofran once this shift. Safety maintained. Pt was instructed to call for needs.

## 2022-08-18 NOTE — PLAN OF CARE
Cm in morning medical rounding. Lipase Elevated 139. Pt is not  medically cleared. Cm will continue following.

## 2022-08-18 NOTE — ASSESSMENT & PLAN NOTE
Patient has hypokalemia which is currently uncontrolled. Last electrolytes reviewed- Recent Labs   Lab 08/16/22  1641 08/17/22  0420   K 3.4* 3.3*   . Will replace potassium and monitor electrolytes closely.

## 2022-08-19 PROBLEM — E66.811 OBESITY (BMI 30.0-34.9): Status: ACTIVE | Noted: 2022-08-19

## 2022-08-19 PROBLEM — R65.20 SEVERE SEPSIS: Status: RESOLVED | Noted: 2022-08-16 | Resolved: 2022-08-19

## 2022-08-19 PROBLEM — K80.43 CHOLEDOCHOLITHIASIS WITH ACUTE CHOLECYSTITIS WITH OBSTRUCTION: Status: RESOLVED | Noted: 2022-08-10 | Resolved: 2022-08-19

## 2022-08-19 PROBLEM — A41.9 SEVERE SEPSIS: Status: RESOLVED | Noted: 2022-08-16 | Resolved: 2022-08-19

## 2022-08-19 PROBLEM — K81.0 ACUTE CHOLECYSTITIS: Status: RESOLVED | Noted: 2022-08-07 | Resolved: 2022-08-19

## 2022-08-19 PROBLEM — E66.9 OBESITY (BMI 30.0-34.9): Status: ACTIVE | Noted: 2022-08-19

## 2022-08-19 PROBLEM — Z34.90 ENCOUNTER FOR ELECTIVE INDUCTION OF LABOR: Status: RESOLVED | Noted: 2022-02-17 | Resolved: 2022-08-19

## 2022-08-19 PROBLEM — D64.9 ANEMIA: Status: ACTIVE | Noted: 2022-08-19

## 2022-08-19 PROBLEM — E80.6 HYPERBILIRUBINEMIA: Status: RESOLVED | Noted: 2022-08-09 | Resolved: 2022-08-19

## 2022-08-19 LAB
ALBUMIN SERPL BCP-MCNC: 2.1 G/DL (ref 3.5–5.2)
ALP SERPL-CCNC: 178 U/L (ref 55–135)
ALT SERPL W/O P-5'-P-CCNC: 70 U/L (ref 10–44)
ANION GAP SERPL CALC-SCNC: 15 MMOL/L (ref 8–16)
AST SERPL-CCNC: 37 U/L (ref 10–40)
BASOPHILS # BLD AUTO: 0.07 K/UL (ref 0–0.2)
BASOPHILS NFR BLD: 0.4 % (ref 0–1.9)
BILIRUB SERPL-MCNC: 0.5 MG/DL (ref 0.1–1)
BUN SERPL-MCNC: 5 MG/DL (ref 6–20)
CALCIUM SERPL-MCNC: 8.4 MG/DL (ref 8.7–10.5)
CHLORIDE SERPL-SCNC: 99 MMOL/L (ref 95–110)
CO2 SERPL-SCNC: 24 MMOL/L (ref 23–29)
CREAT SERPL-MCNC: 0.8 MG/DL (ref 0.5–1.4)
DIFFERENTIAL METHOD: ABNORMAL
EOSINOPHIL # BLD AUTO: 0.3 K/UL (ref 0–0.5)
EOSINOPHIL NFR BLD: 1.8 % (ref 0–8)
ERYTHROCYTE [DISTWIDTH] IN BLOOD BY AUTOMATED COUNT: 14.2 % (ref 11.5–14.5)
EST. GFR  (NO RACE VARIABLE): >60 ML/MIN/1.73 M^2
GLUCOSE SERPL-MCNC: 97 MG/DL (ref 70–110)
HCT VFR BLD AUTO: 30.1 % (ref 37–48.5)
HGB BLD-MCNC: 9.6 G/DL (ref 12–16)
IMM GRANULOCYTES # BLD AUTO: 0.15 K/UL (ref 0–0.04)
IMM GRANULOCYTES NFR BLD AUTO: 1 % (ref 0–0.5)
LIPASE SERPL-CCNC: 140 U/L (ref 4–60)
LYMPHOCYTES # BLD AUTO: 1.6 K/UL (ref 1–4.8)
LYMPHOCYTES NFR BLD: 10.3 % (ref 18–48)
MAGNESIUM SERPL-MCNC: 2.1 MG/DL (ref 1.6–2.6)
MCH RBC QN AUTO: 26.7 PG (ref 27–31)
MCHC RBC AUTO-ENTMCNC: 31.9 G/DL (ref 32–36)
MCV RBC AUTO: 84 FL (ref 82–98)
MONOCYTES # BLD AUTO: 1.2 K/UL (ref 0.3–1)
MONOCYTES NFR BLD: 7.9 % (ref 4–15)
NEUTROPHILS # BLD AUTO: 12.3 K/UL (ref 1.8–7.7)
NEUTROPHILS NFR BLD: 78.6 % (ref 38–73)
NRBC BLD-RTO: 0 /100 WBC
PHOSPHATE SERPL-MCNC: 3.9 MG/DL (ref 2.7–4.5)
PLATELET # BLD AUTO: 475 K/UL (ref 150–450)
PMV BLD AUTO: 11.2 FL (ref 9.2–12.9)
POTASSIUM SERPL-SCNC: 3.2 MMOL/L (ref 3.5–5.1)
PROT SERPL-MCNC: 6.4 G/DL (ref 6–8.4)
RBC # BLD AUTO: 3.59 M/UL (ref 4–5.4)
SODIUM SERPL-SCNC: 138 MMOL/L (ref 136–145)
WBC # BLD AUTO: 15.61 K/UL (ref 3.9–12.7)

## 2022-08-19 PROCEDURE — 25000003 PHARM REV CODE 250: Performed by: HOSPITALIST

## 2022-08-19 PROCEDURE — 80053 COMPREHEN METABOLIC PANEL: CPT

## 2022-08-19 PROCEDURE — 94799 UNLISTED PULMONARY SVC/PX: CPT

## 2022-08-19 PROCEDURE — 25000003 PHARM REV CODE 250: Performed by: STUDENT IN AN ORGANIZED HEALTH CARE EDUCATION/TRAINING PROGRAM

## 2022-08-19 PROCEDURE — 94761 N-INVAS EAR/PLS OXIMETRY MLT: CPT

## 2022-08-19 PROCEDURE — 63600175 PHARM REV CODE 636 W HCPCS

## 2022-08-19 PROCEDURE — 83690 ASSAY OF LIPASE: CPT | Performed by: HOSPITALIST

## 2022-08-19 PROCEDURE — 83735 ASSAY OF MAGNESIUM: CPT

## 2022-08-19 PROCEDURE — 63600175 PHARM REV CODE 636 W HCPCS: Performed by: HOSPITALIST

## 2022-08-19 PROCEDURE — 84100 ASSAY OF PHOSPHORUS: CPT

## 2022-08-19 PROCEDURE — 25000003 PHARM REV CODE 250

## 2022-08-19 PROCEDURE — 63600175 PHARM REV CODE 636 W HCPCS: Performed by: STUDENT IN AN ORGANIZED HEALTH CARE EDUCATION/TRAINING PROGRAM

## 2022-08-19 PROCEDURE — 85025 COMPLETE CBC W/AUTO DIFF WBC: CPT

## 2022-08-19 PROCEDURE — 36415 COLL VENOUS BLD VENIPUNCTURE: CPT

## 2022-08-19 PROCEDURE — 99900035 HC TECH TIME PER 15 MIN (STAT)

## 2022-08-19 PROCEDURE — 36415 COLL VENOUS BLD VENIPUNCTURE: CPT | Performed by: HOSPITALIST

## 2022-08-19 PROCEDURE — 12000002 HC ACUTE/MED SURGE SEMI-PRIVATE ROOM

## 2022-08-19 RX ORDER — IBUPROFEN 600 MG/1
600 TABLET ORAL EVERY 4 HOURS PRN
Status: DISCONTINUED | OUTPATIENT
Start: 2022-08-19 | End: 2022-08-22 | Stop reason: HOSPADM

## 2022-08-19 RX ORDER — POTASSIUM CHLORIDE 7.45 MG/ML
10 INJECTION INTRAVENOUS ONCE
Status: COMPLETED | OUTPATIENT
Start: 2022-08-19 | End: 2022-08-19

## 2022-08-19 RX ADMIN — POTASSIUM BICARBONATE 25 MEQ: 977.5 TABLET, EFFERVESCENT ORAL at 11:08

## 2022-08-19 RX ADMIN — ONDANSETRON 8 MG: 2 INJECTION INTRAMUSCULAR; INTRAVENOUS at 05:08

## 2022-08-19 RX ADMIN — PIPERACILLIN SODIUM AND TAZOBACTAM SODIUM 4.5 G: 4; .5 INJECTION, POWDER, LYOPHILIZED, FOR SOLUTION INTRAVENOUS at 01:08

## 2022-08-19 RX ADMIN — PIPERACILLIN SODIUM AND TAZOBACTAM SODIUM 4.5 G: 4; .5 INJECTION, POWDER, LYOPHILIZED, FOR SOLUTION INTRAVENOUS at 09:08

## 2022-08-19 RX ADMIN — VANCOMYCIN HYDROCHLORIDE 1750 MG: 1 INJECTION, POWDER, LYOPHILIZED, FOR SOLUTION INTRAVENOUS at 06:08

## 2022-08-19 RX ADMIN — DOCUSATE SODIUM AND SENNOSIDES 1 TABLET: 8.6; 5 TABLET, FILM COATED ORAL at 09:08

## 2022-08-19 RX ADMIN — PIPERACILLIN SODIUM AND TAZOBACTAM SODIUM 4.5 G: 4; .5 INJECTION, POWDER, LYOPHILIZED, FOR SOLUTION INTRAVENOUS at 05:08

## 2022-08-19 RX ADMIN — POTASSIUM CHLORIDE 10 MEQ: 7.46 INJECTION, SOLUTION INTRAVENOUS at 11:08

## 2022-08-19 RX ADMIN — HYDROMORPHONE HYDROCHLORIDE 1 MG: 1 INJECTION, SOLUTION INTRAMUSCULAR; INTRAVENOUS; SUBCUTANEOUS at 05:08

## 2022-08-19 RX ADMIN — SODIUM CHLORIDE, SODIUM LACTATE, POTASSIUM CHLORIDE, AND CALCIUM CHLORIDE: .6; .31; .03; .02 INJECTION, SOLUTION INTRAVENOUS at 04:08

## 2022-08-19 RX ADMIN — HYDROMORPHONE HYDROCHLORIDE 1 MG: 1 INJECTION, SOLUTION INTRAMUSCULAR; INTRAVENOUS; SUBCUTANEOUS at 09:08

## 2022-08-19 RX ADMIN — SODIUM CHLORIDE, SODIUM LACTATE, POTASSIUM CHLORIDE, AND CALCIUM CHLORIDE: .6; .31; .03; .02 INJECTION, SOLUTION INTRAVENOUS at 01:08

## 2022-08-19 RX ADMIN — HYDROMORPHONE HYDROCHLORIDE 1 MG: 1 INJECTION, SOLUTION INTRAMUSCULAR; INTRAVENOUS; SUBCUTANEOUS at 01:08

## 2022-08-19 RX ADMIN — HYDROMORPHONE HYDROCHLORIDE 1 MG: 1 INJECTION, SOLUTION INTRAMUSCULAR; INTRAVENOUS; SUBCUTANEOUS at 04:08

## 2022-08-19 RX ADMIN — ONDANSETRON 8 MG: 2 INJECTION INTRAMUSCULAR; INTRAVENOUS at 09:08

## 2022-08-19 NOTE — PROGRESS NOTES
[facility] Hospital Medicine  Progress Note    Patient Name: Mena Ovalle  MRN: [id number]  Patient Class: IP- Inpatient   Admission Date: 8/16/2022  Length of Stay: 1 days  Attending Physician: Claudio Alvarez MD  Primary Care Provider: Francisco Camargo III, MD        Subjective:     Principal Problem:Severe sepsis        HPI:  Mena Ovalle is a 26 y.o. y.o. female who presented to the ED with progressively worsening abdominal pain. Patient was hospitalized from 8/7 to 8/12 where she had recent lap cindy on 8/8 with Dr. Mendoza and required ERCP with stent placement on 8/10 with Dr. Jessica. She reports worsening abdominal pain since surgery and fever x 2 days. She states the constant abdominal pain is mostly central and left sided. Pain is described as an aching sensation that radiates to her back. Denies alleviating or exacerbating factors. Pain was 8/10 at its worst and is currently 6/10. She also reports chills, fatigue, intermittent SOB, n/v/d, and light headedness. She denies CP, cough, blood in stools, and changes in urination. No drainage from incision sites. s. She has only been able to eat applesauce the past two days and has been taking Zofran with minimal relief of her nausea. Patient is currently on stool softeners and reports diarrhea today. She is currently menstruating. ED workup was significant for elevated lipase, transaminates, elevated WBC, mild hypokalemia, mild anemia, and signs of sepsis. Blood cultures and lactate were obtained in the ED. Hospital Medicine was consulted for further workup and management of the patient.           Overview/Hospital Course:  No notes on file    Interval History:  Lipase is elevated today.  Patient seen and examined.  General surgery saw patient yesterday and felt no indication for urgent surgical intervention.  Pain remains severe today, requiring high doses of IV narcotics.    Review of Systems   Constitutional:   Negative for fatigue.   Respiratory:  Negative for cough and shortness of breath.    Cardiovascular:  Negative for chest pain and leg swelling.   Gastrointestinal:  Positive for abdominal pain, nausea and vomiting.   Neurological:  Negative for weakness.   Psychiatric/Behavioral:  Negative for confusion.    All other systems reviewed and are negative.  Objective:     Vital Signs (Most Recent):  Temp: 99.8 °F (37.7 °C) (08/18/22 2043)  Pulse: 88 (08/18/22 2043)  Resp: 15 (08/18/22 2043)  BP: 115/75 (08/18/22 2043)  SpO2: 95 % (08/18/22 2043)   Vital Signs (24h Range):  Temp:  [97.8 °F (36.6 °C)-99.9 °F (37.7 °C)] 99.8 °F (37.7 °C)  Pulse:  [79-97] 88  Resp:  [14-20] 15  SpO2:  [93 %-96 %] 95 %  BP: (115-126)/(72-78) 115/75     Weight: 92.5 kg (204 lb)  Body mass index is 31.95 kg/m².    Intake/Output Summary (Last 24 hours) at 8/18/2022 2053  Last data filed at 8/18/2022 1917  Gross per 24 hour   Intake 1265.74 ml   Output 2250 ml   Net -984.26 ml        Physical Exam  Vitals and nursing note reviewed.   Constitutional:       General: She is not in acute distress.  Eyes:      Pupils: Pupils are equal, round, and reactive to light.   Cardiovascular:      Rate and Rhythm: Normal rate and regular rhythm.      Heart sounds: No murmur heard.  Pulmonary:      Effort: Pulmonary effort is normal.      Breath sounds: Normal breath sounds.   Abdominal:      General: There is distension.      Palpations: Abdomen is soft.      Tenderness: There is abdominal tenderness. There is guarding. There is no rebound.   Skin:     Coloration: Skin is not pale.      Findings: No rash.   Neurological:      Mental Status: She is alert and oriented to person, place, and time.       Significant Labs: All pertinent labs within the past 24 hours have been reviewed.    Significant Imaging: I have reviewed all pertinent imaging results/findings within the past 24 hours.      Assessment/Plan:      * Severe sepsis  This patient does have evidence of  "infective focus  My overall impression is sepsis. Vital signs were reviewed and noted in progress note.    CT Abdomen- Fat stranding, intra-abdominal fluid with small amount of free fluid in the dependent pelvis and scattered collections of fluid elsewhere.  Decrease of the fluid in the gallbladder fossa compared to the prior MRI of 08/09/2022 and overall the fluid collections appear mildly decreased as well.  Although findings could be infectious/inflammatory and phlegmon no walled-off drainable appearing abscess collection is seen at this time.  Interval insertion of biliary stent since the prior MRCP 08/09/2022.  Also with some of the collections peripancreatic pancreatitis primary or secondary could also be present.     Antibiotics given-   Antibiotics (From admission, onward)            Start     Stop Route Frequency Ordered    08/18/22 1830  vancomycin (VANCOCIN) 1,750 mg in dextrose 5 % 500 mL IVPB         -- IV Every 12 hours (non-standard times) 08/18/22 0818 08/17/22 0130  piperacillin-tazobactam 4.5 g in dextrose 5 % 100 mL IVPB (ready to mix system)         -- IV Every 8 hours (non-standard times) 08/16/22 2158 08/16/22 2158  vancomycin - pharmacy to dose  (vancomycin IVPB)        "And" Linked Group Details    -- IV pharmacy to manage frequency 08/16/22 2158        Cultures were taken-   Microbiology Results (last 7 days)     Procedure Component Value Units Date/Time    Blood culture x two cultures. Draw prior to antibiotics. [[id number]] Collected: 08/16/22 1642    Order Status: Completed Specimen: Blood from Antecubital, Left Arm Updated: 08/18/22 0613     Blood Culture, Routine No Growth to date      No Growth to date    Narrative:      Aerobic and anaerobic    Blood culture x two cultures. Draw prior to antibiotics. [[id number]] Collected: 08/16/22 1641    Order Status: Completed Specimen: Blood from Antecubital, Right Arm Updated: 08/18/22 0613     Blood Culture, Routine No Growth to date      " No Growth to date    Narrative:      Aerobic and anaerobic    Urine culture [[id number]] Collected: 08/16/22 1656    Order Status: Completed Specimen: Urine Updated: 08/18/22 0158     Urine Culture, Routine Multiple organisms isolated. None in predominance.  Repeat if      clinically necessary.    Narrative:      Specimen Source->Urine    Culture, Respiratory with Gram Stain [[id number]]     Order Status: No result Specimen: Respiratory         Latest lactate reviewed, they are-  Recent Labs   Lab 08/16/22  1641 08/16/22  1945   LACTATE 1.3 1.3       Organ dysfunction indicated by Acute liver injury  Source- intra-abd vs PNA (intra-abdominal more likely given patient recent history.)    Source control Achieved by- IV abx; general surgery consult   Will continue IV antibiotics for now, and reimage tomorrow call to assess for possible stricture/pseudocyst        Acute pancreatitis  Lipase reviewed-   Lab Results   Component Value Date    LIPASE 139 (H) 08/18/2022       Continue treatment with IV narcotics and IV antiemetics.  Will continue patient on clear liquid diet and advance slowly given her severe pain.  Will reimage with MRCP tomorrow.      Hypokalemia  Patient has hypokalemia which is currently uncontrolled. Last electrolytes reviewed-   Recent Labs   Lab 08/17/22  0420 08/18/22  0519   K 3.3* 3.1*   . Will replace potassium and monitor electrolytes closely.         Transaminitis  Etiology likely potentially related to recent surgery.  Bilirubin remains stable.  Continue to monitor.        VTE Risk Mitigation (From admission, onward)         Ordered     IP VTE HIGH RISK PATIENT  Once         08/16/22 2158     Place sequential compression device  Until discontinued         08/16/22 2158                Discharge Planning   KRISTY: 8/20/2022     Code Status: Full Code   Is the patient medically ready for discharge?:     Reason for patient still in hospital (select all that apply): Treatment  Discharge Plan A: Home  with family                  Claudio Alvarez MD  Department of Hospital Medicine   [facility]

## 2022-08-19 NOTE — CARE UPDATE
08/18/22 1924   Patient Assessment/Suction   Level of Consciousness (AVPU) alert   Respiratory Effort Shallow   PRE-TX-O2   O2 Device (Oxygen Therapy) room air   SpO2 (!) 93 %   Pulse Oximetry Type Intermittent   $ Pulse Oximetry - Multiple Charge Pulse Oximetry - Multiple   Pulse 86   Resp 16   Aerosol Therapy   $ Aerosol Therapy Charges PRN treatment not required   Incentive Spirometer   $ Incentive Spirometer Charges done with encouragement;proper technique demonstrated   Administration (IS) proper technique demonstrated   Number of Repetitions (IS) 10   Level Incentive Spirometer (mL) 150   Patient Tolerance (IS) fair

## 2022-08-19 NOTE — PROGRESS NOTES
[facility] Surgery  Progress Note    Subjective:     Interval History: Symptoms essentially unchanged. Afebrile. Tolerated clears but pain worse with apple sauce.  Lipase up a little to 139. LFT's little improved. Tbili remains within the normal limits.     Post-Op Info:  * No surgery found *          Medications:  Continuous Infusions:   lactated ringers 150 mL/hr at 08/18/22 0123     Scheduled Meds:   piperacillin-tazobactam (ZOSYN) IVPB  4.5 g Intravenous Q8H    senna-docusate 8.6-50 mg  1 tablet Oral BID    vancomycin (VANCOCIN) IVPB  1,750 mg Intravenous Q12H     PRN Meds:acetaminophen, albuterol-ipratropium, calcium gluconate IVPB, calcium gluconate IVPB, calcium gluconate IVPB, dextrose 50%, dextrose 50%, glucagon (human recombinant), glucose, glucose, HYDROmorphone, magnesium oxide, magnesium oxide, magnesium sulfate IVPB, magnesium sulfate IVPB, melatonin, naloxone, ondansetron, potassium bicarbonate, potassium bicarbonate, potassium bicarbonate, potassium chloride **AND** potassium chloride **AND** potassium chloride, sodium chloride 0.9%, sodium phosphate IVPB, sodium phosphate IVPB, sodium phosphate IVPB, Pharmacy to dose Vancomycin consult **AND** vancomycin - pharmacy to dose     Objective:     Vital Signs (Most Recent):  Temp: 99.8 °F (37.7 °C) (08/18/22 2043)  Pulse: 88 (08/18/22 2043)  Resp: 15 (08/18/22 2043)  BP: 115/75 (08/18/22 2043)  SpO2: 95 % (08/18/22 2043) Vital Signs (24h Range):  Temp:  [97.8 °F (36.6 °C)-99.9 °F (37.7 °C)] 99.8 °F (37.7 °C)  Pulse:  [79-97] 88  Resp:  [14-20] 15  SpO2:  [93 %-96 %] 95 %  BP: (115-126)/(72-78) 115/75       Intake/Output Summary (Last 24 hours) at 8/18/2022 2056  Last data filed at 8/18/2022 1917  Gross per 24 hour   Intake 1265.74 ml   Output 2250 ml   Net -984.26 ml       Physical Exam    Significant Labs:  CBC:   Recent Labs   Lab 08/18/22  0519   WBC 14.39*   RBC 3.45*   HGB 9.3*   HCT 28.2*      MCV 82   MCH  27.0   MCHC 33.0     CMP:   Recent Labs   Lab 08/18/22  0519   *   CALCIUM 8.3*   ALBUMIN 2.0*   PROT 6.2      K 3.1*   CO2 25   CL 98   BUN 6   CREATININE 0.8   ALKPHOS 182*   ALT 69*   AST 34   BILITOT 0.6     Lipase:   Recent Labs   Lab 08/18/22  0519   LIPASE 139*       Significant Diagnostics:  I have reviewed all pertinent imaging results/findings within the past 24 hours.    Assessment/Plan:     Active Diagnoses:    Diagnosis Date Noted POA    PRINCIPAL PROBLEM:  Severe sepsis [A41.9, R65.20] 08/16/2022 Yes    Hypokalemia [E87.6] 08/17/2022 Yes    Acute pancreatitis [K85.90] 08/16/2022 Yes    Transaminitis [R74.01] 08/08/2022 Yes      Problems Resolved During this Admission:     -agree with repeat imaging in the morning. Clear liquid diet. Antibiotics.     Virgil Mendoza III, MD  General Surgery  [facility]

## 2022-08-19 NOTE — ASSESSMENT & PLAN NOTE
"This patient does have evidence of infective focus  My overall impression is sepsis. Vital signs were reviewed and noted in progress note.    CT Abdomen- Fat stranding, intra-abdominal fluid with small amount of free fluid in the dependent pelvis and scattered collections of fluid elsewhere.  Decrease of the fluid in the gallbladder fossa compared to the prior MRI of 08/09/2022 and overall the fluid collections appear mildly decreased as well.  Although findings could be infectious/inflammatory and phlegmon no walled-off drainable appearing abscess collection is seen at this time.  Interval insertion of biliary stent since the prior MRCP 08/09/2022.  Also with some of the collections peripancreatic pancreatitis primary or secondary could also be present.     Antibiotics given-   Antibiotics (From admission, onward)            Start     Stop Route Frequency Ordered    08/18/22 1830  vancomycin (VANCOCIN) 1,750 mg in dextrose 5 % 500 mL IVPB         -- IV Every 12 hours (non-standard times) 08/18/22 0818 08/17/22 0130  piperacillin-tazobactam 4.5 g in dextrose 5 % 100 mL IVPB (ready to mix system)         -- IV Every 8 hours (non-standard times) 08/16/22 2158 08/16/22 2158  vancomycin - pharmacy to dose  (vancomycin IVPB)        "And" Linked Group Details    -- IV pharmacy to manage frequency 08/16/22 2158        Cultures were taken-   Microbiology Results (last 7 days)     Procedure Component Value Units Date/Time    Blood culture x two cultures. Draw prior to antibiotics. [[id number]] Collected: 08/16/22 1642    Order Status: Completed Specimen: Blood from Antecubital, Left Arm Updated: 08/18/22 0613     Blood Culture, Routine No Growth to date      No Growth to date    Narrative:      Aerobic and anaerobic    Blood culture x two cultures. Draw prior to antibiotics. [[id number]] Collected: 08/16/22 1641    Order Status: Completed Specimen: Blood from Antecubital, Right Arm Updated: 08/18/22 0613     Blood " Culture, Routine No Growth to date      No Growth to date    Narrative:      Aerobic and anaerobic    Urine culture [[id number]] Collected: 08/16/22 1656    Order Status: Completed Specimen: Urine Updated: 08/18/22 0158     Urine Culture, Routine Multiple organisms isolated. None in predominance.  Repeat if      clinically necessary.    Narrative:      Specimen Source->Urine    Culture, Respiratory with Gram Stain [[id number]]     Order Status: No result Specimen: Respiratory         Latest lactate reviewed, they are-  Recent Labs   Lab 08/16/22  1641 08/16/22  1945   LACTATE 1.3 1.3       Organ dysfunction indicated by Acute liver injury  Source- intra-abd vs PNA (intra-abdominal more likely given patient recent history.)    Source control Achieved by- IV abx; general surgery consult   Will continue IV antibiotics for now, and reimage tomorrow call to assess for possible stricture/pseudocyst

## 2022-08-19 NOTE — CARE UPDATE
08/19/22 0750   Patient Assessment/Suction   Level of Consciousness (AVPU) alert   Respiratory Effort Unlabored   Expansion/Accessory Muscles/Retractions no use of accessory muscles   Rhythm/Pattern, Respiratory unlabored;pattern regular;depth regular   Cough Frequency no cough   PRE-TX-O2   O2 Device (Oxygen Therapy) room air   SpO2 (!) 94 %   Pulse Oximetry Type Intermittent   $ Pulse Oximetry - Multiple Charge Pulse Oximetry - Multiple   Pulse 77   Resp 14   Aerosol Therapy   $ Aerosol Therapy Charges PRN treatment not required   Respiratory Treatment Status (SVN) PRN treatment not required   Incentive Spirometer   $ Incentive Spirometer Charges done with encouragement   Incentive Spirometer Predicted Level (mL) 1500   Administration (IS) proper technique demonstrated   Number of Repetitions (IS) 10   Level Incentive Spirometer (mL) 750   Patient Tolerance (IS) no adverse signs/symptoms present;fair

## 2022-08-19 NOTE — PLAN OF CARE
Plan of care reviewed with pt at beginning of shift.  Pt verbalized understanding. Purposeful rounds completed on this pt throughout shift.  Pain/nausea monitored and covered ATC per request. Up to restroom with SBA, repositions self independently, safety maintained.  Patient has remained free from fall/injury, no skin breakdown noted. Pt aware that she is NPO pending imaging studies for today (Friday)- understanding verbalized.  Side rails up x2, bed in locked and lowest position, call light kept within reach.  Needs attended to

## 2022-08-19 NOTE — ASSESSMENT & PLAN NOTE
Patient has hypokalemia which is currently uncontrolled. Last electrolytes reviewed-   Recent Labs   Lab 08/17/22  0420 08/18/22  0519   K 3.3* 3.1*   . Will replace potassium and monitor electrolytes closely.

## 2022-08-19 NOTE — ASSESSMENT & PLAN NOTE
Lipase reviewed-   Lab Results   Component Value Date    LIPASE 139 (H) 08/18/2022       Continue treatment with IV narcotics and IV antiemetics.  Will continue patient on clear liquid diet and advance slowly given her severe pain.  Will reimage with MRCP tomorrow.

## 2022-08-19 NOTE — HOSPITAL COURSE
Mena Nichols is a 26 year old female with a past medical history of recent cholecystitis/cholangitis s/p cholecystectomy and biliary stent placement (via ERCP), obesity and anemia who presented with persistent abdominal pain, nausea and vomiting secondary to ERCP induced pancreatitis. She was placed on IV fluids, antiemetics, and opiate pain medication. Her diet was able to be advanced and her abdominal pain improved. She was discharged 8/22/2022 and will follow up with her PCP in the outpatient setting.

## 2022-08-20 PROBLEM — E87.6 HYPOKALEMIA: Status: RESOLVED | Noted: 2022-08-17 | Resolved: 2022-08-20

## 2022-08-20 LAB
ALBUMIN SERPL BCP-MCNC: 2.2 G/DL (ref 3.5–5.2)
ALP SERPL-CCNC: 146 U/L (ref 55–135)
ALT SERPL W/O P-5'-P-CCNC: 74 U/L (ref 10–44)
ANION GAP SERPL CALC-SCNC: 13 MMOL/L (ref 8–16)
AST SERPL-CCNC: 41 U/L (ref 10–40)
BASOPHILS # BLD AUTO: 0.05 K/UL (ref 0–0.2)
BASOPHILS NFR BLD: 0.4 % (ref 0–1.9)
BILIRUB SERPL-MCNC: 0.5 MG/DL (ref 0.1–1)
BUN SERPL-MCNC: 4 MG/DL (ref 6–20)
CALCIUM SERPL-MCNC: 8.6 MG/DL (ref 8.7–10.5)
CHLORIDE SERPL-SCNC: 101 MMOL/L (ref 95–110)
CO2 SERPL-SCNC: 26 MMOL/L (ref 23–29)
CREAT SERPL-MCNC: 0.8 MG/DL (ref 0.5–1.4)
DIFFERENTIAL METHOD: ABNORMAL
EOSINOPHIL # BLD AUTO: 0.4 K/UL (ref 0–0.5)
EOSINOPHIL NFR BLD: 3.2 % (ref 0–8)
ERYTHROCYTE [DISTWIDTH] IN BLOOD BY AUTOMATED COUNT: 14 % (ref 11.5–14.5)
EST. GFR  (NO RACE VARIABLE): >60 ML/MIN/1.73 M^2
GLUCOSE SERPL-MCNC: 120 MG/DL (ref 70–110)
HCT VFR BLD AUTO: 31.9 % (ref 37–48.5)
HGB BLD-MCNC: 10.3 G/DL (ref 12–16)
IMM GRANULOCYTES # BLD AUTO: 0.1 K/UL (ref 0–0.04)
IMM GRANULOCYTES NFR BLD AUTO: 0.9 % (ref 0–0.5)
LYMPHOCYTES # BLD AUTO: 1.3 K/UL (ref 1–4.8)
LYMPHOCYTES NFR BLD: 11 % (ref 18–48)
MAGNESIUM SERPL-MCNC: 2.1 MG/DL (ref 1.6–2.6)
MCH RBC QN AUTO: 27 PG (ref 27–31)
MCHC RBC AUTO-ENTMCNC: 32.3 G/DL (ref 32–36)
MCV RBC AUTO: 84 FL (ref 82–98)
MONOCYTES # BLD AUTO: 0.9 K/UL (ref 0.3–1)
MONOCYTES NFR BLD: 7.5 % (ref 4–15)
NEUTROPHILS # BLD AUTO: 9.1 K/UL (ref 1.8–7.7)
NEUTROPHILS NFR BLD: 77 % (ref 38–73)
NRBC BLD-RTO: 0 /100 WBC
PHOSPHATE SERPL-MCNC: 4.1 MG/DL (ref 2.7–4.5)
PLATELET # BLD AUTO: 543 K/UL (ref 150–450)
PMV BLD AUTO: 10.8 FL (ref 9.2–12.9)
POTASSIUM SERPL-SCNC: 3.6 MMOL/L (ref 3.5–5.1)
PROT SERPL-MCNC: 6.6 G/DL (ref 6–8.4)
RBC # BLD AUTO: 3.82 M/UL (ref 4–5.4)
SODIUM SERPL-SCNC: 140 MMOL/L (ref 136–145)
WBC # BLD AUTO: 11.74 K/UL (ref 3.9–12.7)

## 2022-08-20 PROCEDURE — 12000002 HC ACUTE/MED SURGE SEMI-PRIVATE ROOM

## 2022-08-20 PROCEDURE — 94761 N-INVAS EAR/PLS OXIMETRY MLT: CPT

## 2022-08-20 PROCEDURE — 63600175 PHARM REV CODE 636 W HCPCS: Performed by: HOSPITALIST

## 2022-08-20 PROCEDURE — 85025 COMPLETE CBC W/AUTO DIFF WBC: CPT | Performed by: STUDENT IN AN ORGANIZED HEALTH CARE EDUCATION/TRAINING PROGRAM

## 2022-08-20 PROCEDURE — 36415 COLL VENOUS BLD VENIPUNCTURE: CPT | Performed by: STUDENT IN AN ORGANIZED HEALTH CARE EDUCATION/TRAINING PROGRAM

## 2022-08-20 PROCEDURE — 99900035 HC TECH TIME PER 15 MIN (STAT)

## 2022-08-20 PROCEDURE — 63600175 PHARM REV CODE 636 W HCPCS

## 2022-08-20 PROCEDURE — 80053 COMPREHEN METABOLIC PANEL: CPT | Performed by: STUDENT IN AN ORGANIZED HEALTH CARE EDUCATION/TRAINING PROGRAM

## 2022-08-20 PROCEDURE — 84100 ASSAY OF PHOSPHORUS: CPT | Performed by: STUDENT IN AN ORGANIZED HEALTH CARE EDUCATION/TRAINING PROGRAM

## 2022-08-20 PROCEDURE — 25000003 PHARM REV CODE 250

## 2022-08-20 PROCEDURE — 83735 ASSAY OF MAGNESIUM: CPT | Performed by: STUDENT IN AN ORGANIZED HEALTH CARE EDUCATION/TRAINING PROGRAM

## 2022-08-20 PROCEDURE — 94799 UNLISTED PULMONARY SVC/PX: CPT

## 2022-08-20 RX ADMIN — ONDANSETRON 8 MG: 2 INJECTION INTRAMUSCULAR; INTRAVENOUS at 01:08

## 2022-08-20 RX ADMIN — HYDROMORPHONE HYDROCHLORIDE 1 MG: 1 INJECTION, SOLUTION INTRAMUSCULAR; INTRAVENOUS; SUBCUTANEOUS at 10:08

## 2022-08-20 RX ADMIN — SODIUM CHLORIDE, SODIUM LACTATE, POTASSIUM CHLORIDE, AND CALCIUM CHLORIDE: .6; .31; .03; .02 INJECTION, SOLUTION INTRAVENOUS at 03:08

## 2022-08-20 RX ADMIN — PIPERACILLIN SODIUM AND TAZOBACTAM SODIUM 4.5 G: 4; .5 INJECTION, POWDER, LYOPHILIZED, FOR SOLUTION INTRAVENOUS at 01:08

## 2022-08-20 RX ADMIN — HYDROMORPHONE HYDROCHLORIDE 1 MG: 1 INJECTION, SOLUTION INTRAMUSCULAR; INTRAVENOUS; SUBCUTANEOUS at 06:08

## 2022-08-20 RX ADMIN — PIPERACILLIN SODIUM AND TAZOBACTAM SODIUM 4.5 G: 4; .5 INJECTION, POWDER, LYOPHILIZED, FOR SOLUTION INTRAVENOUS at 05:08

## 2022-08-20 RX ADMIN — PIPERACILLIN SODIUM AND TAZOBACTAM SODIUM 4.5 G: 4; .5 INJECTION, POWDER, LYOPHILIZED, FOR SOLUTION INTRAVENOUS at 08:08

## 2022-08-20 RX ADMIN — DOCUSATE SODIUM AND SENNOSIDES 1 TABLET: 8.6; 5 TABLET, FILM COATED ORAL at 08:08

## 2022-08-20 RX ADMIN — HYDROMORPHONE HYDROCHLORIDE 1 MG: 1 INJECTION, SOLUTION INTRAMUSCULAR; INTRAVENOUS; SUBCUTANEOUS at 01:08

## 2022-08-20 RX ADMIN — HYDROMORPHONE HYDROCHLORIDE 1 MG: 1 INJECTION, SOLUTION INTRAMUSCULAR; INTRAVENOUS; SUBCUTANEOUS at 02:08

## 2022-08-20 RX ADMIN — ONDANSETRON 8 MG: 2 INJECTION INTRAMUSCULAR; INTRAVENOUS at 07:08

## 2022-08-20 RX ADMIN — DOCUSATE SODIUM AND SENNOSIDES 1 TABLET: 8.6; 5 TABLET, FILM COATED ORAL at 01:08

## 2022-08-20 NOTE — PROGRESS NOTES
[facility] Hospital Medicine  Progress Note    Patient Name: Mena Ovalle  MRN: [id number]  Patient Class: IP- Inpatient   Admission Date: 8/16/2022  Length of Stay: 2 days  Attending Physician: Portillo Montes MD  Primary Care Provider: Francisco Camargo III, MD        Subjective:     Principal Problem:Acute pancreatitis        HPI:  Mena Ovalle is a 26 y.o. y.o. female who presented to the ED with progressively worsening abdominal pain. Patient was hospitalized from 8/7 to 8/12 where she had recent lap cindy on 8/8 with Dr. Mendoza and required ERCP with stent placement on 8/10 with Dr. Jessica. She reports worsening abdominal pain since surgery and fever x 2 days. She states the constant abdominal pain is mostly central and left sided. Pain is described as an aching sensation that radiates to her back. Denies alleviating or exacerbating factors. Pain was 8/10 at its worst and is currently 6/10. She also reports chills, fatigue, intermittent SOB, n/v/d, and light headedness. She denies CP, cough, blood in stools, and changes in urination. No drainage from incision sites. s. She has only been able to eat applesauce the past two days and has been taking Zofran with minimal relief of her nausea. Patient is currently on stool softeners and reports diarrhea today. She is currently menstruating. ED workup was significant for elevated lipase, transaminates, elevated WBC, mild hypokalemia, mild anemia, and signs of sepsis. Blood cultures and lactate were obtained in the ED. Hospital Medicine was consulted for further workup and management of the patient.           Overview/Hospital Course:  Mena Nichols is a 26 year old female with a past medical history of recent cholecystitis/cholangitis s/p cholecystectomy and biliary stent placement (via ERCP), obesity and anemia who presented with persistent abdominal pain, nausea and vomiting secondary to ERCP induced pancreatitis. She is on  "IV fluids and antiemetics and is currently tolerating PO. Surgery has been consulted. She remains on Zosyn.      Interval History: see "Hospital Course"    Review of Systems   Constitutional:  Negative for fatigue.   Respiratory:  Negative for cough and shortness of breath.    Cardiovascular:  Negative for chest pain and leg swelling.   Gastrointestinal:  Positive for abdominal pain, nausea and vomiting.   Neurological:  Negative for weakness.   Psychiatric/Behavioral:  Negative for confusion.    All other systems reviewed and are negative.  Objective:     Vital Signs (Most Recent):  Temp: 98.2 °F (36.8 °C) (08/19/22 1516)  Pulse: 79 (08/19/22 1516)  Resp: 20 (08/19/22 1719)  BP: 133/80 (08/19/22 1516)  SpO2: 97 % (08/19/22 1516) Vital Signs (24h Range):  Temp:  [97.8 °F (36.6 °C)-99.8 °F (37.7 °C)] 98.2 °F (36.8 °C)  Pulse:  [70-88] 79  Resp:  [14-20] 20  SpO2:  [94 %-97 %] 97 %  BP: (115-133)/(71-80) 133/80     Weight: 92.5 kg (204 lb)  Body mass index is 31.95 kg/m².    Intake/Output Summary (Last 24 hours) at 8/19/2022 1943  Last data filed at 8/19/2022 0754  Gross per 24 hour   Intake 3287.5 ml   Output 1500 ml   Net 1787.5 ml      Physical Exam  Vitals and nursing note reviewed.   Constitutional:       General: She is not in acute distress.     Appearance: She is obese.   HENT:      Head: Normocephalic and atraumatic.      Right Ear: External ear normal.      Left Ear: External ear normal.      Nose: Nose normal.      Mouth/Throat:      Mouth: Mucous membranes are moist.      Pharynx: Oropharynx is clear.   Eyes:      Extraocular Movements: Extraocular movements intact.      Conjunctiva/sclera: Conjunctivae normal.   Cardiovascular:      Rate and Rhythm: Normal rate and regular rhythm.      Pulses: Normal pulses.      Heart sounds: Normal heart sounds. No murmur heard.  Pulmonary:      Effort: Pulmonary effort is normal.      Breath sounds: Normal breath sounds.   Abdominal:      General: There is no " distension.      Palpations: Abdomen is soft.      Tenderness: There is abdominal tenderness. There is no guarding or rebound.   Musculoskeletal:      Cervical back: Normal range of motion and neck supple.   Skin:     General: Skin is warm and dry.      Coloration: Skin is not pale.      Findings: No rash.   Neurological:      General: No focal deficit present.      Mental Status: She is alert and oriented to person, place, and time. Mental status is at baseline.   Psychiatric:         Mood and Affect: Mood normal.         Behavior: Behavior normal.     Significant Labs: All pertinent labs within the past 24 hours have been reviewed.    Significant Imaging: I have reviewed all pertinent imaging results/findings within the past 24 hours.      Assessment/Plan:      * Acute pancreatitis  -IV fluids  -PRN  IV pain and antiemetic medications  -Advance diet as tolerated    Anemia  -Trend Hgb with CBC      Obesity (BMI 30.0-34.9)  Body mass index is 31.95 kg/m². Morbid obesity complicates all aspects of disease management from diagnostic modalities to treatment.     Hypokalemia  -Replete PRN  -Trend K      Transaminitis  Etiology likely potentially related to recent surgery.  -Trend LFTs with CMP        VTE Risk Mitigation (From admission, onward)         Ordered     enoxaparin injection 40 mg  Daily         08/18/22 2101     IP VTE HIGH RISK PATIENT  Once         08/16/22 2158     Place sequential compression device  Until discontinued         08/16/22 2158                Discharge Planning   KRISTY: 8/21/2022     Code Status: Full Code   Is the patient medically ready for discharge?:     Reason for patient still in hospital (select all that apply): Patient trending condition and Treatment  Discharge Plan A: Home with family                  Portillo Montes MD  Department of Hospital Medicine   [facility]

## 2022-08-20 NOTE — CARE UPDATE
08/19/22 1944   Patient Assessment/Suction   Level of Consciousness (AVPU) alert   Respiratory Effort Unlabored   PRE-TX-O2   O2 Device (Oxygen Therapy) room air   SpO2 95 %   Pulse Oximetry Type Intermittent   $ Pulse Oximetry - Multiple Charge Pulse Oximetry - Multiple   Pulse 88   Resp 16   Aerosol Therapy   $ Aerosol Therapy Charges PRN treatment not required   Incentive Spirometer   $ Incentive Spirometer Charges done with encouragement;proper technique demonstrated   Administration (IS) proper technique demonstrated   Number of Repetitions (IS) 10   Level Incentive Spirometer (mL) 750   Patient Tolerance (IS) fair

## 2022-08-20 NOTE — SUBJECTIVE & OBJECTIVE
"Interval History: see "Hospital Course"    Review of Systems   Constitutional:  Negative for fatigue.   Respiratory:  Negative for cough and shortness of breath.    Cardiovascular:  Negative for chest pain and leg swelling.   Gastrointestinal:  Positive for abdominal pain and nausea. Negative for vomiting.   Neurological:  Negative for weakness.   Psychiatric/Behavioral:  Negative for confusion.    All other systems reviewed and are negative.  Objective:     Vital Signs (Most Recent):  Temp: 98.2 °F (36.8 °C) (08/20/22 0712)  Pulse: 77 (08/20/22 0712)  Resp: 18 (08/20/22 1027)  BP: 122/81 (08/20/22 0712)  SpO2: 95 % (08/20/22 0712) Vital Signs (24h Range):  Temp:  [97.3 °F (36.3 °C)-98.9 °F (37.2 °C)] 98.2 °F (36.8 °C)  Pulse:  [73-88] 77  Resp:  [15-20] 18  SpO2:  [94 %-97 %] 95 %  BP: (117-133)/(71-81) 122/81     Weight: 92.5 kg (204 lb)  Body mass index is 31.95 kg/m².    Intake/Output Summary (Last 24 hours) at 8/20/2022 1118  Last data filed at 8/20/2022 0516  Gross per 24 hour   Intake 1422.92 ml   Output --   Net 1422.92 ml      Physical Exam  Vitals and nursing note reviewed.   Constitutional:       General: She is not in acute distress.     Appearance: She is obese.   HENT:      Head: Normocephalic and atraumatic.      Right Ear: External ear normal.      Left Ear: External ear normal.      Nose: Nose normal.      Mouth/Throat:      Mouth: Mucous membranes are moist.      Pharynx: Oropharynx is clear.   Eyes:      Extraocular Movements: Extraocular movements intact.      Conjunctiva/sclera: Conjunctivae normal.   Cardiovascular:      Rate and Rhythm: Normal rate and regular rhythm.      Pulses: Normal pulses.      Heart sounds: Normal heart sounds. No murmur heard.  Pulmonary:      Effort: Pulmonary effort is normal.      Breath sounds: Normal breath sounds.   Abdominal:      General: There is no distension.      Palpations: Abdomen is soft.      Tenderness: There is abdominal tenderness. There is no " guarding or rebound.   Musculoskeletal:      Cervical back: Normal range of motion and neck supple.   Skin:     General: Skin is warm and dry.      Coloration: Skin is not pale.      Findings: No rash.   Neurological:      General: No focal deficit present.      Mental Status: She is alert and oriented to person, place, and time. Mental status is at baseline.   Psychiatric:         Mood and Affect: Mood normal.         Behavior: Behavior normal.       Significant Labs: All pertinent labs within the past 24 hours have been reviewed.    Significant Imaging: I have reviewed all pertinent imaging results/findings within the past 24 hours.

## 2022-08-20 NOTE — PROGRESS NOTES
[facility] Hospital Medicine  Progress Note    Patient Name: Mena Ovalle  MRN: [id number]  Patient Class: IP- Inpatient   Admission Date: 8/16/2022  Length of Stay: 3 days  Attending Physician: Portillo Montes MD  Primary Care Provider: Francisco Camargo III, MD        Subjective:     Principal Problem:Acute pancreatitis        HPI:  Mena Ovalle is a 26 y.o. y.o. female who presented to the ED with progressively worsening abdominal pain. Patient was hospitalized from 8/7 to 8/12 where she had recent lap cindy on 8/8 with Dr. Mendoza and required ERCP with stent placement on 8/10 with Dr. Jessica. She reports worsening abdominal pain since surgery and fever x 2 days. She states the constant abdominal pain is mostly central and left sided. Pain is described as an aching sensation that radiates to her back. Denies alleviating or exacerbating factors. Pain was 8/10 at its worst and is currently 6/10. She also reports chills, fatigue, intermittent SOB, n/v/d, and light headedness. She denies CP, cough, blood in stools, and changes in urination. No drainage from incision sites. s. She has only been able to eat applesauce the past two days and has been taking Zofran with minimal relief of her nausea. Patient is currently on stool softeners and reports diarrhea today. She is currently menstruating. ED workup was significant for elevated lipase, transaminates, elevated WBC, mild hypokalemia, mild anemia, and signs of sepsis. Blood cultures and lactate were obtained in the ED. Hospital Medicine was consulted for further workup and management of the patient.           Overview/Hospital Course:  Mena Nichols is a 26 year old female with a past medical history of recent cholecystitis/cholangitis s/p cholecystectomy and biliary stent placement (via ERCP), obesity and anemia who presented with persistent abdominal pain, nausea and vomiting secondary to ERCP induced pancreatitis. She is on  "IV fluids and antiemetics and is currently tolerating PO. Surgery has been consulted. She remains on Zosyn. Her symptoms are improving.      Interval History: see "Hospital Course"    Review of Systems   Constitutional:  Negative for fatigue.   Respiratory:  Negative for cough and shortness of breath.    Cardiovascular:  Negative for chest pain and leg swelling.   Gastrointestinal:  Positive for abdominal pain and nausea. Negative for vomiting.   Neurological:  Negative for weakness.   Psychiatric/Behavioral:  Negative for confusion.    All other systems reviewed and are negative.  Objective:     Vital Signs (Most Recent):  Temp: 98.2 °F (36.8 °C) (08/20/22 0712)  Pulse: 77 (08/20/22 0712)  Resp: 18 (08/20/22 1027)  BP: 122/81 (08/20/22 0712)  SpO2: 95 % (08/20/22 0712) Vital Signs (24h Range):  Temp:  [97.3 °F (36.3 °C)-98.9 °F (37.2 °C)] 98.2 °F (36.8 °C)  Pulse:  [73-88] 77  Resp:  [15-20] 18  SpO2:  [94 %-97 %] 95 %  BP: (117-133)/(71-81) 122/81     Weight: 92.5 kg (204 lb)  Body mass index is 31.95 kg/m².    Intake/Output Summary (Last 24 hours) at 8/20/2022 1118  Last data filed at 8/20/2022 0516  Gross per 24 hour   Intake 1422.92 ml   Output --   Net 1422.92 ml      Physical Exam  Vitals and nursing note reviewed.   Constitutional:       General: She is not in acute distress.     Appearance: She is obese.   HENT:      Head: Normocephalic and atraumatic.      Right Ear: External ear normal.      Left Ear: External ear normal.      Nose: Nose normal.      Mouth/Throat:      Mouth: Mucous membranes are moist.      Pharynx: Oropharynx is clear.   Eyes:      Extraocular Movements: Extraocular movements intact.      Conjunctiva/sclera: Conjunctivae normal.   Cardiovascular:      Rate and Rhythm: Normal rate and regular rhythm.      Pulses: Normal pulses.      Heart sounds: Normal heart sounds. No murmur heard.  Pulmonary:      Effort: Pulmonary effort is normal.      Breath sounds: Normal breath sounds. "   Abdominal:      General: There is no distension.      Palpations: Abdomen is soft.      Tenderness: There is abdominal tenderness. There is no guarding or rebound.   Musculoskeletal:      Cervical back: Normal range of motion and neck supple.   Skin:     General: Skin is warm and dry.      Coloration: Skin is not pale.      Findings: No rash.   Neurological:      General: No focal deficit present.      Mental Status: She is alert and oriented to person, place, and time. Mental status is at baseline.   Psychiatric:         Mood and Affect: Mood normal.         Behavior: Behavior normal.       Significant Labs: All pertinent labs within the past 24 hours have been reviewed.    Significant Imaging: I have reviewed all pertinent imaging results/findings within the past 24 hours.      Assessment/Plan:      * Acute pancreatitis  -IV fluids  -PRN  IV pain and antiemetic medications  -Advance diet as tolerated    Anemia  -Trend Hgb with CBC      Obesity (BMI 30.0-34.9)  Body mass index is 31.95 kg/m². Morbid obesity complicates all aspects of disease management from diagnostic modalities to treatment.     Transaminitis  Etiology likely potentially related to recent surgery.  -Trend LFTs with CMP        VTE Risk Mitigation (From admission, onward)         Ordered     enoxaparin injection 40 mg  Daily         08/18/22 2101     IP VTE HIGH RISK PATIENT  Once         08/16/22 2158     Place sequential compression device  Until discontinued         08/16/22 2158                Discharge Planning   KRISTY: 8/21/2022     Code Status: Full Code   Is the patient medically ready for discharge?:     Reason for patient still in hospital (select all that apply): Patient trending condition and Treatment  Discharge Plan A: Home with family                  Portillo Montes MD  Department of Hospital Medicine   [facility]

## 2022-08-20 NOTE — PLAN OF CARE
08/20/22 0704   Patient Assessment/Suction   Level of Consciousness (AVPU) alert   Respiratory Effort Normal;Unlabored   Expansion/Accessory Muscles/Retractions no retractions;no use of accessory muscles   All Lung Fields Breath Sounds clear;Anterior:;Lateral:   Rhythm/Pattern, Respiratory depth regular;pattern regular;unlabored   Cough Frequency no cough   PRE-TX-O2   O2 Device (Oxygen Therapy) room air   SpO2 96 %   Pulse Oximetry Type Intermittent   $ Pulse Oximetry - Multiple Charge Pulse Oximetry - Multiple   Pulse 74   Resp 18   Aerosol Therapy   $ Aerosol Therapy Charges PRN treatment not required   Respiratory Treatment Status (SVN) PRN treatment not required   Incentive Spirometer   $ Incentive Spirometer Charges other (see comments)  (pt sleeping)

## 2022-08-20 NOTE — ASSESSMENT & PLAN NOTE
This patient does have evidence of infective focus  My overall impression is sepsis. Vital signs were reviewed and noted in progress note.    CT Abdomen- Fat stranding, intra-abdominal fluid with small amount of free fluid in the dependent pelvis and scattered collections of fluid elsewhere.  Decrease of the fluid in the gallbladder fossa compared to the prior MRI of 08/09/2022 and overall the fluid collections appear mildly decreased as well.  Although findings could be infectious/inflammatory and phlegmon no walled-off drainable appearing abscess collection is seen at this time.  Interval insertion of biliary stent since the prior MRCP 08/09/2022.  Also with some of the collections peripancreatic pancreatitis primary or secondary could also be present.     Antibiotics given-   Antibiotics (From admission, onward)            Start     Stop Route Frequency Ordered    08/17/22 0130  piperacillin-tazobactam 4.5 g in dextrose 5 % 100 mL IVPB (ready to mix system)         -- IV Every 8 hours (non-standard times) 08/16/22 2158        Cultures were taken-   Microbiology Results (last 7 days)     Procedure Component Value Units Date/Time    Blood culture x two cultures. Draw prior to antibiotics. [[id number]] Collected: 08/16/22 1641    Order Status: Completed Specimen: Blood from Antecubital, Right Arm Updated: 08/20/22 0612     Blood Culture, Routine No Growth to date      No Growth to date      No Growth to date      No Growth to date    Narrative:      Aerobic and anaerobic    Blood culture x two cultures. Draw prior to antibiotics. [[id number]] Collected: 08/16/22 1642    Order Status: Completed Specimen: Blood from Antecubital, Left Arm Updated: 08/20/22 0612     Blood Culture, Routine No Growth to date      No Growth to date      No Growth to date      No Growth to date    Narrative:      Aerobic and anaerobic    Urine culture [[id number]] Collected: 08/16/22 1656    Order Status: Completed Specimen: Urine Updated:  08/18/22 0158     Urine Culture, Routine Multiple organisms isolated. None in predominance.  Repeat if      clinically necessary.    Narrative:      Specimen Source->Urine    Culture, Respiratory with Gram Stain [[id number]]     Order Status: Canceled Specimen: Respiratory         Latest lactate reviewed, they are-  No results for input(s): LACTATE in the last 72 hours.    Organ dysfunction indicated by Acute liver injury  Source- intra-abd vs PNA (intra-abdominal more likely given patient recent history.)    Source control Achieved by- IV abx; general surgery consult   Will continue IV antibiotics for now, and reimage tomorrow call to assess for possible stricture/pseudocyst

## 2022-08-20 NOTE — ASSESSMENT & PLAN NOTE
This patient does have evidence of infective focus  My overall impression is sepsis. Vital signs were reviewed and noted in progress note.    CT Abdomen- Fat stranding, intra-abdominal fluid with small amount of free fluid in the dependent pelvis and scattered collections of fluid elsewhere.  Decrease of the fluid in the gallbladder fossa compared to the prior MRI of 08/09/2022 and overall the fluid collections appear mildly decreased as well.  Although findings could be infectious/inflammatory and phlegmon no walled-off drainable appearing abscess collection is seen at this time.  Interval insertion of biliary stent since the prior MRCP 08/09/2022.  Also with some of the collections peripancreatic pancreatitis primary or secondary could also be present.     Antibiotics given-   Antibiotics (From admission, onward)            Start     Stop Route Frequency Ordered    08/17/22 0130  piperacillin-tazobactam 4.5 g in dextrose 5 % 100 mL IVPB (ready to mix system)         -- IV Every 8 hours (non-standard times) 08/16/22 2158        Cultures were taken-   Microbiology Results (last 7 days)     Procedure Component Value Units Date/Time    Blood culture x two cultures. Draw prior to antibiotics. [[id number]] Collected: 08/16/22 1641    Order Status: Completed Specimen: Blood from Antecubital, Right Arm Updated: 08/19/22 0612     Blood Culture, Routine No Growth to date      No Growth to date      No Growth to date    Narrative:      Aerobic and anaerobic    Blood culture x two cultures. Draw prior to antibiotics. [[id number]] Collected: 08/16/22 1642    Order Status: Completed Specimen: Blood from Antecubital, Left Arm Updated: 08/19/22 0612     Blood Culture, Routine No Growth to date      No Growth to date      No Growth to date    Narrative:      Aerobic and anaerobic    Urine culture [[id number]] Collected: 08/16/22 1656    Order Status: Completed Specimen: Urine Updated: 08/18/22 0158     Urine Culture, Routine Multiple  organisms isolated. None in predominance.  Repeat if      clinically necessary.    Narrative:      Specimen Source->Urine    Culture, Respiratory with Gram Stain [[id number]]     Order Status: Canceled Specimen: Respiratory         Latest lactate reviewed, they are-  Recent Labs   Lab 08/16/22 1945   LACTATE 1.3       Organ dysfunction indicated by Acute liver injury  Source- intra-abd vs PNA (intra-abdominal more likely given patient recent history.)    Source control Achieved by- IV abx; general surgery consult   Will continue IV antibiotics for now, and reimage tomorrow call to assess for possible stricture/pseudocyst

## 2022-08-20 NOTE — ASSESSMENT & PLAN NOTE
-IV fluids  -PRN  IV pain and antiemetic medications  -Advance diet as tolerated   The Plan for Transition of Care is related to the following treatment goals:  D/c home    The Patient and/or patient representative  was provided with a choice of provider and agrees   with the discharge plan. [x] Yes [] No    Freedom of choice list was provided with basic dialogue that supports the patient's individualized plan of care/goals, treatment preferences and shares the quality data associated with the providers. [x] Yes [] No    Referral to [facility]. Case management will continue to follow progress and update discharge plan as needed.     CALVIN MayN, .[phone]

## 2022-08-20 NOTE — SUBJECTIVE & OBJECTIVE
"Interval History: see "Hospital Course"    Review of Systems   Constitutional:  Negative for fatigue.   Respiratory:  Negative for cough and shortness of breath.    Cardiovascular:  Negative for chest pain and leg swelling.   Gastrointestinal:  Positive for abdominal pain, nausea and vomiting.   Neurological:  Negative for weakness.   Psychiatric/Behavioral:  Negative for confusion.    All other systems reviewed and are negative.  Objective:     Vital Signs (Most Recent):  Temp: 98.2 °F (36.8 °C) (08/19/22 1516)  Pulse: 79 (08/19/22 1516)  Resp: 20 (08/19/22 1719)  BP: 133/80 (08/19/22 1516)  SpO2: 97 % (08/19/22 1516) Vital Signs (24h Range):  Temp:  [97.8 °F (36.6 °C)-99.8 °F (37.7 °C)] 98.2 °F (36.8 °C)  Pulse:  [70-88] 79  Resp:  [14-20] 20  SpO2:  [94 %-97 %] 97 %  BP: (115-133)/(71-80) 133/80     Weight: 92.5 kg (204 lb)  Body mass index is 31.95 kg/m².    Intake/Output Summary (Last 24 hours) at 8/19/2022 1943  Last data filed at 8/19/2022 0754  Gross per 24 hour   Intake 3287.5 ml   Output 1500 ml   Net 1787.5 ml      Physical Exam  Vitals and nursing note reviewed.   Constitutional:       General: She is not in acute distress.     Appearance: She is obese.   HENT:      Head: Normocephalic and atraumatic.      Right Ear: External ear normal.      Left Ear: External ear normal.      Nose: Nose normal.      Mouth/Throat:      Mouth: Mucous membranes are moist.      Pharynx: Oropharynx is clear.   Eyes:      Extraocular Movements: Extraocular movements intact.      Conjunctiva/sclera: Conjunctivae normal.   Cardiovascular:      Rate and Rhythm: Normal rate and regular rhythm.      Pulses: Normal pulses.      Heart sounds: Normal heart sounds. No murmur heard.  Pulmonary:      Effort: Pulmonary effort is normal.      Breath sounds: Normal breath sounds.   Abdominal:      General: There is no distension.      Palpations: Abdomen is soft.      Tenderness: There is abdominal tenderness. There is no guarding or " rebound.   Musculoskeletal:      Cervical back: Normal range of motion and neck supple.   Skin:     General: Skin is warm and dry.      Coloration: Skin is not pale.      Findings: No rash.   Neurological:      General: No focal deficit present.      Mental Status: She is alert and oriented to person, place, and time. Mental status is at baseline.   Psychiatric:         Mood and Affect: Mood normal.         Behavior: Behavior normal.     Significant Labs: All pertinent labs within the past 24 hours have been reviewed.    Significant Imaging: I have reviewed all pertinent imaging results/findings within the past 24 hours.

## 2022-08-20 NOTE — ASSESSMENT & PLAN NOTE
Body mass index is 31.95 kg/m². Morbid obesity complicates all aspects of disease management from diagnostic modalities to treatment.

## 2022-08-20 NOTE — PLAN OF CARE
Plan of care reviewed with patient. Patient verbalized complete understanding. Pt awake, alert, and oriented. Pt complaining of pain, meds given. Pt complaining of nausea, meds given. Pt IV CDI and infusing. Pt has not had any episodes of vomiting. All fall precautions maintained, bed in lowest position, locked, call light within reach. Side rails up times 2. Slip resistant socks maintained.

## 2022-08-21 LAB
ALBUMIN SERPL BCP-MCNC: 2.2 G/DL (ref 3.5–5.2)
ALP SERPL-CCNC: 118 U/L (ref 55–135)
ALT SERPL W/O P-5'-P-CCNC: 68 U/L (ref 10–44)
ANION GAP SERPL CALC-SCNC: 9 MMOL/L (ref 8–16)
AST SERPL-CCNC: 37 U/L (ref 10–40)
BASOPHILS # BLD AUTO: 0.05 K/UL (ref 0–0.2)
BASOPHILS NFR BLD: 0.5 % (ref 0–1.9)
BILIRUB SERPL-MCNC: 0.4 MG/DL (ref 0.1–1)
BUN SERPL-MCNC: 4 MG/DL (ref 6–20)
CALCIUM SERPL-MCNC: 8.7 MG/DL (ref 8.7–10.5)
CHLORIDE SERPL-SCNC: 102 MMOL/L (ref 95–110)
CO2 SERPL-SCNC: 27 MMOL/L (ref 23–29)
CREAT SERPL-MCNC: 0.8 MG/DL (ref 0.5–1.4)
DIFFERENTIAL METHOD: ABNORMAL
EOSINOPHIL # BLD AUTO: 0.4 K/UL (ref 0–0.5)
EOSINOPHIL NFR BLD: 3.7 % (ref 0–8)
ERYTHROCYTE [DISTWIDTH] IN BLOOD BY AUTOMATED COUNT: 13.9 % (ref 11.5–14.5)
EST. GFR  (NO RACE VARIABLE): >60 ML/MIN/1.73 M^2
GLUCOSE SERPL-MCNC: 120 MG/DL (ref 70–110)
HCT VFR BLD AUTO: 30.5 % (ref 37–48.5)
HGB BLD-MCNC: 9.6 G/DL (ref 12–16)
IMM GRANULOCYTES # BLD AUTO: 0.07 K/UL (ref 0–0.04)
IMM GRANULOCYTES NFR BLD AUTO: 0.7 % (ref 0–0.5)
LYMPHOCYTES # BLD AUTO: 1.4 K/UL (ref 1–4.8)
LYMPHOCYTES NFR BLD: 13.1 % (ref 18–48)
MAGNESIUM SERPL-MCNC: 2.1 MG/DL (ref 1.6–2.6)
MCH RBC QN AUTO: 26.3 PG (ref 27–31)
MCHC RBC AUTO-ENTMCNC: 31.5 G/DL (ref 32–36)
MCV RBC AUTO: 84 FL (ref 82–98)
MONOCYTES # BLD AUTO: 0.9 K/UL (ref 0.3–1)
MONOCYTES NFR BLD: 8.4 % (ref 4–15)
NEUTROPHILS # BLD AUTO: 7.7 K/UL (ref 1.8–7.7)
NEUTROPHILS NFR BLD: 73.6 % (ref 38–73)
NRBC BLD-RTO: 0 /100 WBC
PHOSPHATE SERPL-MCNC: 4.1 MG/DL (ref 2.7–4.5)
PLATELET # BLD AUTO: 586 K/UL (ref 150–450)
PMV BLD AUTO: 10.7 FL (ref 9.2–12.9)
POTASSIUM SERPL-SCNC: 3.4 MMOL/L (ref 3.5–5.1)
PROT SERPL-MCNC: 6.6 G/DL (ref 6–8.4)
RBC # BLD AUTO: 3.65 M/UL (ref 4–5.4)
SODIUM SERPL-SCNC: 138 MMOL/L (ref 136–145)
WBC # BLD AUTO: 10.41 K/UL (ref 3.9–12.7)

## 2022-08-21 PROCEDURE — 63600175 PHARM REV CODE 636 W HCPCS: Performed by: STUDENT IN AN ORGANIZED HEALTH CARE EDUCATION/TRAINING PROGRAM

## 2022-08-21 PROCEDURE — 85025 COMPLETE CBC W/AUTO DIFF WBC: CPT | Performed by: STUDENT IN AN ORGANIZED HEALTH CARE EDUCATION/TRAINING PROGRAM

## 2022-08-21 PROCEDURE — 80053 COMPREHEN METABOLIC PANEL: CPT | Performed by: STUDENT IN AN ORGANIZED HEALTH CARE EDUCATION/TRAINING PROGRAM

## 2022-08-21 PROCEDURE — 63600175 PHARM REV CODE 636 W HCPCS

## 2022-08-21 PROCEDURE — 94761 N-INVAS EAR/PLS OXIMETRY MLT: CPT

## 2022-08-21 PROCEDURE — 63600175 PHARM REV CODE 636 W HCPCS: Performed by: HOSPITALIST

## 2022-08-21 PROCEDURE — 94799 UNLISTED PULMONARY SVC/PX: CPT

## 2022-08-21 PROCEDURE — 36415 COLL VENOUS BLD VENIPUNCTURE: CPT | Performed by: STUDENT IN AN ORGANIZED HEALTH CARE EDUCATION/TRAINING PROGRAM

## 2022-08-21 PROCEDURE — 12000002 HC ACUTE/MED SURGE SEMI-PRIVATE ROOM

## 2022-08-21 PROCEDURE — 83735 ASSAY OF MAGNESIUM: CPT | Performed by: STUDENT IN AN ORGANIZED HEALTH CARE EDUCATION/TRAINING PROGRAM

## 2022-08-21 PROCEDURE — 25000003 PHARM REV CODE 250: Performed by: STUDENT IN AN ORGANIZED HEALTH CARE EDUCATION/TRAINING PROGRAM

## 2022-08-21 PROCEDURE — 99900035 HC TECH TIME PER 15 MIN (STAT)

## 2022-08-21 PROCEDURE — 25000003 PHARM REV CODE 250

## 2022-08-21 PROCEDURE — 84100 ASSAY OF PHOSPHORUS: CPT | Performed by: STUDENT IN AN ORGANIZED HEALTH CARE EDUCATION/TRAINING PROGRAM

## 2022-08-21 RX ORDER — HYDROMORPHONE HYDROCHLORIDE 1 MG/ML
0.5 INJECTION, SOLUTION INTRAMUSCULAR; INTRAVENOUS; SUBCUTANEOUS EVERY 6 HOURS PRN
Status: DISCONTINUED | OUTPATIENT
Start: 2022-08-21 | End: 2022-08-22 | Stop reason: HOSPADM

## 2022-08-21 RX ORDER — POTASSIUM CHLORIDE 20 MEQ/1
40 TABLET, EXTENDED RELEASE ORAL ONCE
Status: DISCONTINUED | OUTPATIENT
Start: 2022-08-21 | End: 2022-08-21

## 2022-08-21 RX ORDER — POTASSIUM CHLORIDE 7.45 MG/ML
10 INJECTION INTRAVENOUS
Status: DISPENSED | OUTPATIENT
Start: 2022-08-21 | End: 2022-08-21

## 2022-08-21 RX ORDER — OXYCODONE AND ACETAMINOPHEN 10; 325 MG/1; MG/1
1 TABLET ORAL EVERY 6 HOURS PRN
Status: DISCONTINUED | OUTPATIENT
Start: 2022-08-21 | End: 2022-08-22 | Stop reason: HOSPADM

## 2022-08-21 RX ORDER — POTASSIUM CHLORIDE 7.45 MG/ML
10 INJECTION INTRAVENOUS ONCE
Status: COMPLETED | OUTPATIENT
Start: 2022-08-21 | End: 2022-08-21

## 2022-08-21 RX ADMIN — OXYCODONE AND ACETAMINOPHEN 1 TABLET: 10; 325 TABLET ORAL at 09:08

## 2022-08-21 RX ADMIN — HYDROMORPHONE HYDROCHLORIDE 1 MG: 1 INJECTION, SOLUTION INTRAMUSCULAR; INTRAVENOUS; SUBCUTANEOUS at 10:08

## 2022-08-21 RX ADMIN — POTASSIUM CHLORIDE 10 MEQ: 7.46 INJECTION, SOLUTION INTRAVENOUS at 06:08

## 2022-08-21 RX ADMIN — OXYCODONE AND ACETAMINOPHEN 1 TABLET: 10; 325 TABLET ORAL at 03:08

## 2022-08-21 RX ADMIN — ONDANSETRON 8 MG: 2 INJECTION INTRAMUSCULAR; INTRAVENOUS at 10:08

## 2022-08-21 RX ADMIN — HYDROMORPHONE HYDROCHLORIDE 1 MG: 1 INJECTION, SOLUTION INTRAMUSCULAR; INTRAVENOUS; SUBCUTANEOUS at 02:08

## 2022-08-21 RX ADMIN — PIPERACILLIN SODIUM AND TAZOBACTAM SODIUM 4.5 G: 4; .5 INJECTION, POWDER, LYOPHILIZED, FOR SOLUTION INTRAVENOUS at 01:08

## 2022-08-21 RX ADMIN — SODIUM CHLORIDE, SODIUM LACTATE, POTASSIUM CHLORIDE, AND CALCIUM CHLORIDE: .6; .31; .03; .02 INJECTION, SOLUTION INTRAVENOUS at 09:08

## 2022-08-21 RX ADMIN — HYDROMORPHONE HYDROCHLORIDE 1 MG: 1 INJECTION, SOLUTION INTRAMUSCULAR; INTRAVENOUS; SUBCUTANEOUS at 06:08

## 2022-08-21 RX ADMIN — SODIUM CHLORIDE, SODIUM LACTATE, POTASSIUM CHLORIDE, AND CALCIUM CHLORIDE: .6; .31; .03; .02 INJECTION, SOLUTION INTRAVENOUS at 06:08

## 2022-08-21 RX ADMIN — POTASSIUM CHLORIDE 10 MEQ: 7.46 INJECTION, SOLUTION INTRAVENOUS at 03:08

## 2022-08-21 NOTE — PROGRESS NOTES
[facility] Hospital Medicine  Progress Note    Patient Name: Mena Ovalle  MRN: [id number]  Patient Class: IP- Inpatient   Admission Date: 8/16/2022  Length of Stay: 4 days  Attending Physician: Portillo Montes MD  Primary Care Provider: Francisco Camargo III, MD        Subjective:     Principal Problem:Acute pancreatitis        HPI:  Mena Ovalle is a 26 y.o. y.o. female who presented to the ED with progressively worsening abdominal pain. Patient was hospitalized from 8/7 to 8/12 where she had recent lap cindy on 8/8 with Dr. Mendoza and required ERCP with stent placement on 8/10 with Dr. Jessica. She reports worsening abdominal pain since surgery and fever x 2 days. She states the constant abdominal pain is mostly central and left sided. Pain is described as an aching sensation that radiates to her back. Denies alleviating or exacerbating factors. Pain was 8/10 at its worst and is currently 6/10. She also reports chills, fatigue, intermittent SOB, n/v/d, and light headedness. She denies CP, cough, blood in stools, and changes in urination. No drainage from incision sites. s. She has only been able to eat applesauce the past two days and has been taking Zofran with minimal relief of her nausea. Patient is currently on stool softeners and reports diarrhea today. She is currently menstruating. ED workup was significant for elevated lipase, transaminates, elevated WBC, mild hypokalemia, mild anemia, and signs of sepsis. Blood cultures and lactate were obtained in the ED. Hospital Medicine was consulted for further workup and management of the patient.           Overview/Hospital Course:  Mena Nichols is a 26 year old female with a past medical history of recent cholecystitis/cholangitis s/p cholecystectomy and biliary stent placement (via ERCP), obesity and anemia who presented with persistent abdominal pain, nausea and vomiting secondary to ERCP induced pancreatitis. She is on  "IV fluids and antiemetics and is currently tolerating a PO diet. Surgery has been consulted. Her symptoms are improving.      Interval History: see "Hospital Course"    Review of Systems   Constitutional:  Negative for fatigue.   Respiratory:  Negative for cough and shortness of breath.    Cardiovascular:  Negative for chest pain and leg swelling.   Gastrointestinal:  Positive for abdominal pain and nausea. Negative for vomiting.   Neurological:  Negative for weakness.   Psychiatric/Behavioral:  Negative for confusion.    All other systems reviewed and are negative.  Objective:     Vital Signs (Most Recent):  Temp: 97.5 °F (36.4 °C) (08/21/22 0822)  Pulse: 73 (08/21/22 0822)  Resp: 16 (08/21/22 0822)  BP: 132/81 (08/21/22 0822)  SpO2: 96 % (08/21/22 0822) Vital Signs (24h Range):  Temp:  [97 °F (36.1 °C)-98.8 °F (37.1 °C)] 97.5 °F (36.4 °C)  Pulse:  [73-88] 73  Resp:  [14-20] 16  SpO2:  [94 %-98 %] 96 %  BP: (106-132)/(67-85) 132/81     Weight: 92.5 kg (204 lb)  Body mass index is 31.95 kg/m².    Intake/Output Summary (Last 24 hours) at 8/21/2022 0940  Last data filed at 8/21/2022 0615  Gross per 24 hour   Intake 3415 ml   Output 2750 ml   Net 665 ml      Physical Exam  Vitals and nursing note reviewed.   Constitutional:       General: She is not in acute distress.     Appearance: She is obese.   HENT:      Head: Normocephalic and atraumatic.      Right Ear: External ear normal.      Left Ear: External ear normal.      Nose: Nose normal.      Mouth/Throat:      Mouth: Mucous membranes are moist.      Pharynx: Oropharynx is clear.   Eyes:      Extraocular Movements: Extraocular movements intact.      Conjunctiva/sclera: Conjunctivae normal.   Cardiovascular:      Rate and Rhythm: Normal rate and regular rhythm.      Pulses: Normal pulses.      Heart sounds: Normal heart sounds. No murmur heard.  Pulmonary:      Effort: Pulmonary effort is normal.      Breath sounds: Normal breath sounds.   Abdominal:      General: " There is no distension.      Palpations: Abdomen is soft.      Tenderness: There is abdominal tenderness. There is no guarding or rebound.   Musculoskeletal:      Cervical back: Normal range of motion and neck supple.   Skin:     General: Skin is warm and dry.      Coloration: Skin is not pale.      Findings: No rash.   Neurological:      General: No focal deficit present.      Mental Status: She is alert and oriented to person, place, and time. Mental status is at baseline.   Psychiatric:         Mood and Affect: Mood normal.         Behavior: Behavior normal.       Significant Labs: All pertinent labs within the past 24 hours have been reviewed.    Significant Imaging: I have reviewed all pertinent imaging results/findings within the past 24 hours.      Assessment/Plan:      * Acute pancreatitis  -IV fluids  -PRN  IV pain and antiemetic medications  -Advance diet as tolerated    Anemia  -Trend Hgb with CBC      Obesity (BMI 30.0-34.9)  Body mass index is 31.95 kg/m². Morbid obesity complicates all aspects of disease management from diagnostic modalities to treatment.     Hypokalemia  -Replete PRN  -Trend K      Transaminitis  Etiology likely potentially related to recent surgery. Improving.  -Trend LFTs with CMP        VTE Risk Mitigation (From admission, onward)         Ordered     enoxaparin injection 40 mg  Daily         08/18/22 2101     IP VTE HIGH RISK PATIENT  Once         08/16/22 2158     Place sequential compression device  Until discontinued         08/16/22 2158                Discharge Planning   KRISTY: 8/21/2022     Code Status: Full Code   Is the patient medically ready for discharge?:     Reason for patient still in hospital (select all that apply): Patient trending condition and Treatment  Discharge Plan A: Home with family                  Portillo Montes MD  Department of Hospital Medicine   [facility]

## 2022-08-21 NOTE — ASSESSMENT & PLAN NOTE
This patient does have evidence of infective focus  My overall impression is sepsis. Vital signs were reviewed and noted in progress note.    CT Abdomen- Fat stranding, intra-abdominal fluid with small amount of free fluid in the dependent pelvis and scattered collections of fluid elsewhere.  Decrease of the fluid in the gallbladder fossa compared to the prior MRI of 08/09/2022 and overall the fluid collections appear mildly decreased as well.  Although findings could be infectious/inflammatory and phlegmon no walled-off drainable appearing abscess collection is seen at this time.  Interval insertion of biliary stent since the prior MRCP 08/09/2022.  Also with some of the collections peripancreatic pancreatitis primary or secondary could also be present.     Antibiotics given-   Antibiotics (From admission, onward)            None        Cultures were taken-   Microbiology Results (last 7 days)     Procedure Component Value Units Date/Time    Blood culture x two cultures. Draw prior to antibiotics. [[id number]] Collected: 08/16/22 1642    Order Status: Completed Specimen: Blood from Antecubital, Left Arm Updated: 08/21/22 0612     Blood Culture, Routine No Growth to date      No Growth to date      No Growth to date      No Growth to date      No Growth to date    Narrative:      Aerobic and anaerobic    Blood culture x two cultures. Draw prior to antibiotics. [[id number]] Collected: 08/16/22 1641    Order Status: Completed Specimen: Blood from Antecubital, Right Arm Updated: 08/21/22 0612     Blood Culture, Routine No Growth to date      No Growth to date      No Growth to date      No Growth to date      No Growth to date    Narrative:      Aerobic and anaerobic    Urine culture [[id number]] Collected: 08/16/22 1656    Order Status: Completed Specimen: Urine Updated: 08/18/22 0158     Urine Culture, Routine Multiple organisms isolated. None in predominance.  Repeat if      clinically necessary.    Narrative:       Specimen Source->Urine    Culture, Respiratory with Gram Stain [[id number]]     Order Status: Canceled Specimen: Respiratory         Latest lactate reviewed, they are-  No results for input(s): LACTATE in the last 72 hours.    Organ dysfunction indicated by Acute liver injury  Source- intra-abd vs PNA (intra-abdominal more likely given patient recent history.)    Source control Achieved by- IV abx; general surgery consult   Will continue IV antibiotics for now, and reimage tomorrow call to assess for possible stricture/pseudocyst

## 2022-08-21 NOTE — SUBJECTIVE & OBJECTIVE
"Interval History: see "Hospital Course"    Review of Systems   Constitutional:  Negative for fatigue.   Respiratory:  Negative for cough and shortness of breath.    Cardiovascular:  Negative for chest pain and leg swelling.   Gastrointestinal:  Positive for abdominal pain and nausea. Negative for vomiting.   Neurological:  Negative for weakness.   Psychiatric/Behavioral:  Negative for confusion.    All other systems reviewed and are negative.  Objective:     Vital Signs (Most Recent):  Temp: 97.5 °F (36.4 °C) (08/21/22 0822)  Pulse: 73 (08/21/22 0822)  Resp: 16 (08/21/22 0822)  BP: 132/81 (08/21/22 0822)  SpO2: 96 % (08/21/22 0822) Vital Signs (24h Range):  Temp:  [97 °F (36.1 °C)-98.8 °F (37.1 °C)] 97.5 °F (36.4 °C)  Pulse:  [73-88] 73  Resp:  [14-20] 16  SpO2:  [94 %-98 %] 96 %  BP: (106-132)/(67-85) 132/81     Weight: 92.5 kg (204 lb)  Body mass index is 31.95 kg/m².    Intake/Output Summary (Last 24 hours) at 8/21/2022 0940  Last data filed at 8/21/2022 0615  Gross per 24 hour   Intake 3415 ml   Output 2750 ml   Net 665 ml      Physical Exam  Vitals and nursing note reviewed.   Constitutional:       General: She is not in acute distress.     Appearance: She is obese.   HENT:      Head: Normocephalic and atraumatic.      Right Ear: External ear normal.      Left Ear: External ear normal.      Nose: Nose normal.      Mouth/Throat:      Mouth: Mucous membranes are moist.      Pharynx: Oropharynx is clear.   Eyes:      Extraocular Movements: Extraocular movements intact.      Conjunctiva/sclera: Conjunctivae normal.   Cardiovascular:      Rate and Rhythm: Normal rate and regular rhythm.      Pulses: Normal pulses.      Heart sounds: Normal heart sounds. No murmur heard.  Pulmonary:      Effort: Pulmonary effort is normal.      Breath sounds: Normal breath sounds.   Abdominal:      General: There is no distension.      Palpations: Abdomen is soft.      Tenderness: There is abdominal tenderness. There is no guarding " or rebound.   Musculoskeletal:      Cervical back: Normal range of motion and neck supple.   Skin:     General: Skin is warm and dry.      Coloration: Skin is not pale.      Findings: No rash.   Neurological:      General: No focal deficit present.      Mental Status: She is alert and oriented to person, place, and time. Mental status is at baseline.   Psychiatric:         Mood and Affect: Mood normal.         Behavior: Behavior normal.       Significant Labs: All pertinent labs within the past 24 hours have been reviewed.    Significant Imaging: I have reviewed all pertinent imaging results/findings within the past 24 hours.

## 2022-08-21 NOTE — PLAN OF CARE
08/21/22 0645   Patient Assessment/Suction   Level of Consciousness (AVPU) alert   Respiratory Effort Normal;Unlabored   Expansion/Accessory Muscles/Retractions no retractions;no use of accessory muscles   All Lung Fields Breath Sounds clear;Anterior:;Lateral:   Rhythm/Pattern, Respiratory depth regular;pattern regular;unlabored   Cough Frequency no cough   PRE-TX-O2   O2 Device (Oxygen Therapy) room air   SpO2 96 %   Pulse Oximetry Type Intermittent   $ Pulse Oximetry - Multiple Charge Pulse Oximetry - Multiple   Pulse 74   Resp 18   Aerosol Therapy   $ Aerosol Therapy Charges PRN treatment not required   Respiratory Treatment Status (SVN) PRN treatment not required   Incentive Spirometer   $ Incentive Spirometer Charges done with encouragement   Incentive Spirometer Predicted Level (mL) 1500   Administration (IS) proper technique demonstrated   Number of Repetitions (IS) 10   Level Incentive Spirometer (mL) 750   Patient Tolerance (IS) fair

## 2022-08-21 NOTE — CARE UPDATE
08/20/22 2010   Patient Assessment/Suction   Level of Consciousness (AVPU) alert   Respiratory Effort Normal;Unlabored   Expansion/Accessory Muscles/Retractions expansion symmetric;no retractions;no use of accessory muscles   PRE-TX-O2   O2 Device (Oxygen Therapy) room air   SpO2 97 %   Pulse Oximetry Type Intermittent   Aerosol Therapy   $ Aerosol Therapy Charges PRN treatment not required   Respiratory Treatment Status (SVN) PRN treatment not required   Incentive Spirometer   $ Incentive Spirometer Charges other (see comments)  (Nurse did IS with pt)

## 2022-08-22 VITALS
BODY MASS INDEX: 32.02 KG/M2 | OXYGEN SATURATION: 98 % | SYSTOLIC BLOOD PRESSURE: 143 MMHG | RESPIRATION RATE: 18 BRPM | HEART RATE: 76 BPM | TEMPERATURE: 99 F | WEIGHT: 204 LBS | HEIGHT: 67 IN | DIASTOLIC BLOOD PRESSURE: 82 MMHG

## 2022-08-22 PROBLEM — D75.839 THROMBOCYTOSIS: Status: ACTIVE | Noted: 2022-08-22

## 2022-08-22 PROBLEM — E87.6 HYPOKALEMIA: Status: RESOLVED | Noted: 2022-08-17 | Resolved: 2022-08-22

## 2022-08-22 LAB
ALBUMIN SERPL BCP-MCNC: 2.4 G/DL (ref 3.5–5.2)
ALP SERPL-CCNC: 112 U/L (ref 55–135)
ALT SERPL W/O P-5'-P-CCNC: 63 U/L (ref 10–44)
ANION GAP SERPL CALC-SCNC: 12 MMOL/L (ref 8–16)
AST SERPL-CCNC: 26 U/L (ref 10–40)
BACTERIA BLD CULT: NORMAL
BACTERIA BLD CULT: NORMAL
BASOPHILS # BLD AUTO: 0.06 K/UL (ref 0–0.2)
BASOPHILS NFR BLD: 0.8 % (ref 0–1.9)
BILIRUB SERPL-MCNC: 0.4 MG/DL (ref 0.1–1)
BUN SERPL-MCNC: 5 MG/DL (ref 6–20)
CALCIUM SERPL-MCNC: 9 MG/DL (ref 8.7–10.5)
CHLORIDE SERPL-SCNC: 104 MMOL/L (ref 95–110)
CO2 SERPL-SCNC: 23 MMOL/L (ref 23–29)
CREAT SERPL-MCNC: 0.8 MG/DL (ref 0.5–1.4)
DIFFERENTIAL METHOD: ABNORMAL
EOSINOPHIL # BLD AUTO: 0.4 K/UL (ref 0–0.5)
EOSINOPHIL NFR BLD: 4.7 % (ref 0–8)
ERYTHROCYTE [DISTWIDTH] IN BLOOD BY AUTOMATED COUNT: 13.8 % (ref 11.5–14.5)
EST. GFR  (NO RACE VARIABLE): >60 ML/MIN/1.73 M^2
GLUCOSE SERPL-MCNC: 112 MG/DL (ref 70–110)
HCT VFR BLD AUTO: 33.7 % (ref 37–48.5)
HGB BLD-MCNC: 10.8 G/DL (ref 12–16)
IMM GRANULOCYTES # BLD AUTO: 0.04 K/UL (ref 0–0.04)
IMM GRANULOCYTES NFR BLD AUTO: 0.5 % (ref 0–0.5)
LYMPHOCYTES # BLD AUTO: 1.9 K/UL (ref 1–4.8)
LYMPHOCYTES NFR BLD: 24.8 % (ref 18–48)
MAGNESIUM SERPL-MCNC: 2.1 MG/DL (ref 1.6–2.6)
MCH RBC QN AUTO: 27.3 PG (ref 27–31)
MCHC RBC AUTO-ENTMCNC: 32 G/DL (ref 32–36)
MCV RBC AUTO: 85 FL (ref 82–98)
MONOCYTES # BLD AUTO: 0.6 K/UL (ref 0.3–1)
MONOCYTES NFR BLD: 7.4 % (ref 4–15)
NEUTROPHILS # BLD AUTO: 4.6 K/UL (ref 1.8–7.7)
NEUTROPHILS NFR BLD: 61.8 % (ref 38–73)
NRBC BLD-RTO: 0 /100 WBC
PHOSPHATE SERPL-MCNC: 4 MG/DL (ref 2.7–4.5)
PLATELET # BLD AUTO: 622 K/UL (ref 150–450)
PMV BLD AUTO: 10.7 FL (ref 9.2–12.9)
POTASSIUM SERPL-SCNC: 3.8 MMOL/L (ref 3.5–5.1)
PROT SERPL-MCNC: 6.7 G/DL (ref 6–8.4)
RBC # BLD AUTO: 3.96 M/UL (ref 4–5.4)
SODIUM SERPL-SCNC: 139 MMOL/L (ref 136–145)
WBC # BLD AUTO: 7.47 K/UL (ref 3.9–12.7)

## 2022-08-22 PROCEDURE — 94761 N-INVAS EAR/PLS OXIMETRY MLT: CPT

## 2022-08-22 PROCEDURE — 94799 UNLISTED PULMONARY SVC/PX: CPT

## 2022-08-22 PROCEDURE — 83735 ASSAY OF MAGNESIUM: CPT | Performed by: STUDENT IN AN ORGANIZED HEALTH CARE EDUCATION/TRAINING PROGRAM

## 2022-08-22 PROCEDURE — 84100 ASSAY OF PHOSPHORUS: CPT | Performed by: STUDENT IN AN ORGANIZED HEALTH CARE EDUCATION/TRAINING PROGRAM

## 2022-08-22 PROCEDURE — 25000003 PHARM REV CODE 250: Performed by: STUDENT IN AN ORGANIZED HEALTH CARE EDUCATION/TRAINING PROGRAM

## 2022-08-22 PROCEDURE — 99900035 HC TECH TIME PER 15 MIN (STAT)

## 2022-08-22 PROCEDURE — 63600175 PHARM REV CODE 636 W HCPCS

## 2022-08-22 PROCEDURE — 85025 COMPLETE CBC W/AUTO DIFF WBC: CPT | Performed by: STUDENT IN AN ORGANIZED HEALTH CARE EDUCATION/TRAINING PROGRAM

## 2022-08-22 PROCEDURE — 36415 COLL VENOUS BLD VENIPUNCTURE: CPT | Performed by: STUDENT IN AN ORGANIZED HEALTH CARE EDUCATION/TRAINING PROGRAM

## 2022-08-22 PROCEDURE — 80053 COMPREHEN METABOLIC PANEL: CPT | Performed by: STUDENT IN AN ORGANIZED HEALTH CARE EDUCATION/TRAINING PROGRAM

## 2022-08-22 RX ORDER — ONDANSETRON 4 MG/1
8 TABLET, ORALLY DISINTEGRATING ORAL EVERY 8 HOURS PRN
Qty: 12 TABLET | Refills: 0 | OUTPATIENT
Start: 2022-08-22 | End: 2022-08-24

## 2022-08-22 RX ORDER — HYDROCODONE BITARTRATE AND ACETAMINOPHEN 5; 325 MG/1; MG/1
1 TABLET ORAL EVERY 6 HOURS PRN
Qty: 12 TABLET | Refills: 0 | Status: SHIPPED | OUTPATIENT
Start: 2022-08-22 | End: 2023-05-31

## 2022-08-22 RX ADMIN — SODIUM CHLORIDE, SODIUM LACTATE, POTASSIUM CHLORIDE, AND CALCIUM CHLORIDE: .6; .31; .03; .02 INJECTION, SOLUTION INTRAVENOUS at 03:08

## 2022-08-22 RX ADMIN — OXYCODONE AND ACETAMINOPHEN 1 TABLET: 10; 325 TABLET ORAL at 03:08

## 2022-08-22 NOTE — PROGRESS NOTES
Discussed discharge medications (hydrocodone/APAP and Zofran) with patient. Discussed importance of taking stool softener with Norco. Pt demonstrated full understanding and had no follow up questions.

## 2022-08-22 NOTE — PLAN OF CARE
Pt is cleared form  for discharge.       08/22/22 1317   Final Note   Assessment Type Final Discharge Note   Anticipated Discharge Disposition Home   Hospital Resources/Appts/Education Provided Appointments scheduled by Navigator/Coordinator

## 2022-08-22 NOTE — PLAN OF CARE
Plan of care reviewed with pt. Pt verbalized understanding. Patient is alert and oriented x 4, able to make needs known. Continuous cardiac monitoring, TELE #[id number]. A-febrile throughout the shift. Meds given per MAR. IVF infusing as ordered. Ambulated to bathroom independently without difficulty. Repositions self independently. Complaints of pain and discomfort, PRN pain medication given, full relief obtained. Purposeful hourly/q2hr rounding done during shift to promote patient safety. NAD noted. Safety maintained with side rails up x3, bed wheels locked, bed in lowest positioned, call light in reach. Patient educated to call for assistance with ambulation if needed, verbalized understanding. Pt remains free of falls. Will continue to monitor.

## 2022-08-22 NOTE — CARE UPDATE
08/21/22 1926   Patient Assessment/Suction   Level of Consciousness (AVPU) alert   Respiratory Effort Normal;Unlabored   Expansion/Accessory Muscles/Retractions no use of accessory muscles   All Lung Fields Breath Sounds Anterior:;Lateral:;clear   Rhythm/Pattern, Respiratory unlabored;pattern regular   Cough Frequency no cough   PRE-TX-O2   O2 Device (Oxygen Therapy) room air   SpO2 95 %   Pulse Oximetry Type Intermittent   $ Pulse Oximetry - Multiple Charge Pulse Oximetry - Multiple   Pulse 78   Resp 16   Aerosol Therapy   $ Aerosol Therapy Charges PRN treatment not required   Respiratory Treatment Status (SVN) PRN treatment not required   Incentive Spirometer   $ Incentive Spirometer Charges done with encouragement   Incentive Spirometer Predicted Level (mL) 1500   Administration (IS) proper technique demonstrated   Number of Repetitions (IS) 10   Level Incentive Spirometer (mL) 750   Patient Tolerance (IS) fair

## 2022-08-22 NOTE — PLAN OF CARE
08/22/22 0706   Patient Assessment/Suction   Level of Consciousness (AVPU) alert   Respiratory Effort Normal;Unlabored   Expansion/Accessory Muscles/Retractions no retractions;no use of accessory muscles   All Lung Fields Breath Sounds clear;Anterior:;Lateral:   Rhythm/Pattern, Respiratory depth regular;pattern regular;unlabored   Cough Frequency no cough   PRE-TX-O2   O2 Device (Oxygen Therapy) room air   SpO2 97 %   Pulse Oximetry Type Intermittent   $ Pulse Oximetry - Multiple Charge Pulse Oximetry - Multiple   Pulse 64   Resp 18   Aerosol Therapy   $ Aerosol Therapy Charges PRN treatment not required   Respiratory Treatment Status (SVN) PRN treatment not required   Incentive Spirometer   $ Incentive Spirometer Charges done independently per patient   Incentive Spirometer Predicted Level (mL) 1500   Administration (IS) self-administered   Number of Repetitions (IS) 10   Level Incentive Spirometer (mL) 750   Patient Tolerance (IS) fair

## 2022-08-22 NOTE — DISCHARGE SUMMARY
[facility] Hospital Medicine  Discharge Summary      Patient Name: Mena Ovalle  MRN: [id number]  Patient Class: IP- Inpatient  Admission Date: 8/16/2022  Hospital Length of Stay: 5 days  Discharge Date and Time: No discharge date for patient encounter.  Attending Physician: Portillo Montes MD   Discharging Provider: Portillo Montes MD  Primary Care Provider: Francisco Camargo III, MD      HPI:   Mena Ovalle is a 26 y.o. y.o. female who presented to the ED with progressively worsening abdominal pain. Patient was hospitalized from 8/7 to 8/12 where she had recent lap cindy on 8/8 with Dr. Mendoza and required ERCP with stent placement on 8/10 with Dr. Jessica. She reports worsening abdominal pain since surgery and fever x 2 days. She states the constant abdominal pain is mostly central and left sided. Pain is described as an aching sensation that radiates to her back. Denies alleviating or exacerbating factors. Pain was 8/10 at its worst and is currently 6/10. She also reports chills, fatigue, intermittent SOB, n/v/d, and light headedness. She denies CP, cough, blood in stools, and changes in urination. No drainage from incision sites. s. She has only been able to eat applesauce the past two days and has been taking Zofran with minimal relief of her nausea. Patient is currently on stool softeners and reports diarrhea today. She is currently menstruating. ED workup was significant for elevated lipase, transaminates, elevated WBC, mild hypokalemia, mild anemia, and signs of sepsis. Blood cultures and lactate were obtained in the ED. Hospital Medicine was consulted for further workup and management of the patient.           * No surgery found *      Hospital Course:   Mena Nichols is a 26 year old female with a past medical history of recent cholecystitis/cholangitis s/p cholecystectomy and biliary stent placement (via ERCP), obesity and anemia who presented with persistent  Pt notified.    abdominal pain, nausea and vomiting secondary to ERCP induced pancreatitis. She was placed on IV fluids, antiemetics, and opiate pain medication. Her diet was able to be advanced and her abdominal pain improved. She was discharged 8/22/2022 and will follow up with her PCP in the outpatient setting.       Goals of Care Treatment Preferences:  Code Status: Full Code      Consults:   Consults (From admission, onward)        Status Ordering Provider     Inpatient consult to General Surgery  Once        Provider:  MD Rocío Goldberg III, ANNA C.          * Acute pancreatitis  -IV fluids  -PRN  IV pain and antiemetic medications  -Advance diet as tolerated    Thrombocytosis  In setting of acute pancreatitis.  -Trended platelets with CBC      Anemia  -Trend Hgb with CBC      Obesity (BMI 30.0-34.9)  Body mass index is 31.95 kg/m². Morbid obesity complicates all aspects of disease management from diagnostic modalities to treatment.     Transaminitis  Etiology likely potentially related to recent surgery. Improving.  -Trend LFTs with CMP      Final Active Diagnoses:    Diagnosis Date Noted POA    PRINCIPAL PROBLEM:  Acute pancreatitis [K85.90] 08/16/2022 Yes    Thrombocytosis [D75.839] 08/22/2022 Yes    Obesity (BMI 30.0-34.9) [E66.9] 08/19/2022 Yes    Anemia [D64.9] 08/19/2022 Yes    Transaminitis [R74.01] 08/08/2022 Yes      Problems Resolved During this Admission:    Diagnosis Date Noted Date Resolved POA    Hypokalemia [E87.6] 08/17/2022 08/22/2022 Yes    Severe sepsis [A41.9, R65.20] 08/16/2022 08/19/2022 Yes       Discharged Condition: stable    Disposition: Home or Self Care    Follow Up:   Follow-up Information     Francisco Camargo III, MD Follow up in 2 week(s).    Specialty: Family Medicine  Contact information:  [address] [place] [address]  [address] [place] [id number] [phone]                       Patient Instructions:      Diet Adult Regular     Notify your health care provider if you  experience any of the following:  increased confusion or weakness     Notify your health care provider if you experience any of the following:  persistent dizziness, light-headedness, or visual disturbances     Notify your health care provider if you experience any of the following:  severe persistent headache     Notify your health care provider if you experience any of the following:  difficulty breathing or increased cough     Notify your health care provider if you experience any of the following:  severe uncontrolled pain     Notify your health care provider if you experience any of the following:  temperature >100.4     Notify your health care provider if you experience any of the following:  persistent nausea and vomiting or diarrhea     Activity as tolerated       Significant Diagnostic Studies: Labs: All labs within the past 24 hours have been reviewed    Pending Diagnostic Studies:     None         Medications:  Reconciled Home Medications:      Medication List      CONTINUE taking these medications    HYDROcodone-acetaminophen 5-325 mg per tablet  Commonly known as: NORCO  Take 1 tablet by mouth every 6 (six) hours as needed for Pain.     ondansetron 4 MG Tbdl  Commonly known as: ZOFRAN-ODT  Take 2 tablets (8 mg total) by mouth every 8 (eight) hours as needed (nausea/vomiting).        STOP taking these medications    amoxicillin-clavulanate 875-125mg 875-125 mg per tablet  Commonly known as: AUGMENTIN     dicyclomine 10 MG capsule  Commonly known as: BENTYL     docusate sodium 100 MG capsule  Commonly known as: COLACE     gabapentin 100 MG capsule  Commonly known as: NEURONTIN     traMADoL 50 mg tablet  Commonly known as: ULTRAM            Indwelling Lines/Drains at time of discharge:   Lines/Drains/Airways     None                 Time spent on the discharge of patient: 32 minutes         Portillo Montes MD  Department of Hospital Medicine  [facility]

## 2022-08-23 ENCOUNTER — OFFICE VISIT (OUTPATIENT)
Dept: SURGERY | Facility: CLINIC | Age: 27
End: 2022-08-23
Payer: MEDICAID

## 2022-08-23 ENCOUNTER — TELEPHONE (OUTPATIENT)
Dept: MEDSURG UNIT | Facility: HOSPITAL | Age: 27
End: 2022-08-23
Payer: MEDICAID

## 2022-08-23 ENCOUNTER — PATIENT OUTREACH (OUTPATIENT)
Dept: ADMINISTRATIVE | Facility: CLINIC | Age: 27
End: 2022-08-23
Payer: MEDICAID

## 2022-08-23 VITALS
SYSTOLIC BLOOD PRESSURE: 131 MMHG | TEMPERATURE: 98 F | HEART RATE: 89 BPM | HEIGHT: 67 IN | WEIGHT: 204 LBS | DIASTOLIC BLOOD PRESSURE: 82 MMHG | BODY MASS INDEX: 32.02 KG/M2

## 2022-08-23 DIAGNOSIS — K80.42 CHOLEDOCHOLITHIASIS WITH ACUTE CHOLECYSTITIS: Primary | ICD-10-CM

## 2022-08-23 DIAGNOSIS — K85.10 ACUTE BILIARY PANCREATITIS WITHOUT INFECTION OR NECROSIS: ICD-10-CM

## 2022-08-23 PROCEDURE — 99024 POSTOP FOLLOW-UP VISIT: CPT | Mod: S$GLB,,, | Performed by: SURGERY

## 2022-08-23 PROCEDURE — 3044F PR MOST RECENT HEMOGLOBIN A1C LEVEL <7.0%: ICD-10-PCS | Mod: CPTII,S$GLB,, | Performed by: SURGERY

## 2022-08-23 PROCEDURE — 3075F SYST BP GE 130 - 139MM HG: CPT | Mod: CPTII,S$GLB,, | Performed by: SURGERY

## 2022-08-23 PROCEDURE — 3044F HG A1C LEVEL LT 7.0%: CPT | Mod: CPTII,S$GLB,, | Performed by: SURGERY

## 2022-08-23 PROCEDURE — 1160F PR REVIEW ALL MEDS BY PRESCRIBER/CLIN PHARMACIST DOCUMENTED: ICD-10-PCS | Mod: CPTII,S$GLB,, | Performed by: SURGERY

## 2022-08-23 PROCEDURE — 3079F PR MOST RECENT DIASTOLIC BLOOD PRESSURE 80-89 MM HG: ICD-10-PCS | Mod: CPTII,S$GLB,, | Performed by: SURGERY

## 2022-08-23 PROCEDURE — 1160F RVW MEDS BY RX/DR IN RCRD: CPT | Mod: CPTII,S$GLB,, | Performed by: SURGERY

## 2022-08-23 PROCEDURE — 3008F BODY MASS INDEX DOCD: CPT | Mod: CPTII,S$GLB,, | Performed by: SURGERY

## 2022-08-23 PROCEDURE — 1159F PR MEDICATION LIST DOCUMENTED IN MEDICAL RECORD: ICD-10-PCS | Mod: CPTII,S$GLB,, | Performed by: SURGERY

## 2022-08-23 PROCEDURE — 99024 PR POST-OP FOLLOW-UP VISIT: ICD-10-PCS | Mod: S$GLB,,, | Performed by: SURGERY

## 2022-08-23 PROCEDURE — 3079F DIAST BP 80-89 MM HG: CPT | Mod: CPTII,S$GLB,, | Performed by: SURGERY

## 2022-08-23 PROCEDURE — 3075F PR MOST RECENT SYSTOLIC BLOOD PRESS GE 130-139MM HG: ICD-10-PCS | Mod: CPTII,S$GLB,, | Performed by: SURGERY

## 2022-08-23 PROCEDURE — 1159F MED LIST DOCD IN RCRD: CPT | Mod: CPTII,S$GLB,, | Performed by: SURGERY

## 2022-08-23 PROCEDURE — 3008F PR BODY MASS INDEX (BMI) DOCUMENTED: ICD-10-PCS | Mod: CPTII,S$GLB,, | Performed by: SURGERY

## 2022-08-23 NOTE — TELEPHONE ENCOUNTER
Contacted pt to reschedule appt, no providers were available for a later time the same day so we pushed the appt to the next available which was 9/2/22.  Pt verbalized appt date, time, and location with no further questions

## 2022-08-23 NOTE — PROGRESS NOTES
Subjective:       Patient ID: Mena Ovalle is a 26 y.o. female.    Chief Complaint: Post-op Evaluation      HPI:  Patient is 26-year-old female who was admitted with acute cholecystitis with choledocholithiasis and pancreatitis little over week ago.  She had cholecystectomy followed by ERCP with sphincterotomy stone extraction and stent placement.  She was readmitted with persistent pancreatitis end of last week and through the weekend.  She returns today for follow-up. Abdominal pain much improved. She reports some mild pain with palpation but overall much improved. No pain or nausea with meals.     Review of Systems    Objective:      Physical Exam  Constitutional:       General: She is not in acute distress.  Pulmonary:      Effort: Pulmonary effort is normal. No respiratory distress.   Abdominal:      General: There is no distension.      Palpations: Abdomen is soft.      Tenderness: There is no abdominal tenderness. There is no guarding or rebound.   Skin:     Comments: Incisions are clean, dry and intact  There is no evidence of infection, hematoma or seroma    Neurological:      Mental Status: She is alert and oriented to person, place, and time.   Psychiatric:         Behavior: Behavior is cooperative.         Assessment/Plan:   Choledocholithiasis with acute cholecystitis    Acute biliary pancreatitis without infection or necrosis    s/p cholecystectomy, ERCP with sphincterotomy and stent. She has plan for repeat ERCP with stent removal in six weeks. Doing much better clinically. Follow up with me PRN.

## 2022-08-23 NOTE — PROGRESS NOTES
C3 nurse spoke with Mena Ovalle for a TCC post hospital discharge follow up call. The patient has a scheduled HOSFU appointment with Marianne Rivera on 8/29/22 @ 5011.

## 2022-08-24 ENCOUNTER — NURSE TRIAGE (OUTPATIENT)
Dept: ADMINISTRATIVE | Facility: CLINIC | Age: 27
End: 2022-08-24
Payer: MEDICAID

## 2022-08-24 ENCOUNTER — HOSPITAL ENCOUNTER (EMERGENCY)
Facility: HOSPITAL | Age: 27
Discharge: HOME | End: 2022-08-24
Attending: EMERGENCY MEDICINE
Payer: MEDICAID

## 2022-08-24 VITALS
TEMPERATURE: 99 F | BODY MASS INDEX: 32.02 KG/M2 | RESPIRATION RATE: 18 BRPM | HEART RATE: 76 BPM | DIASTOLIC BLOOD PRESSURE: 86 MMHG | HEIGHT: 67 IN | WEIGHT: 204 LBS | SYSTOLIC BLOOD PRESSURE: 120 MMHG | OXYGEN SATURATION: 99 %

## 2022-08-24 DIAGNOSIS — U07.1 COVID-19: Primary | ICD-10-CM

## 2022-08-24 DIAGNOSIS — R53.83 FATIGUE, UNSPECIFIED TYPE: ICD-10-CM

## 2022-08-24 DIAGNOSIS — R11.10 VOMITING, INTRACTABILITY OF VOMITING NOT SPECIFIED, PRESENCE OF NAUSEA NOT SPECIFIED, UNSPECIFIED VOMITING TYPE: ICD-10-CM

## 2022-08-24 DIAGNOSIS — R50.9 FEVER: ICD-10-CM

## 2022-08-24 LAB
ALBUMIN SERPL BCP-MCNC: 3.1 G/DL (ref 3.5–5.2)
ALP SERPL-CCNC: 106 U/L (ref 55–135)
ALT SERPL W/O P-5'-P-CCNC: 59 U/L (ref 10–44)
ANION GAP SERPL CALC-SCNC: 13 MMOL/L (ref 8–16)
AST SERPL-CCNC: 30 U/L (ref 10–40)
B-HCG UR QL: NEGATIVE
BACTERIA #/AREA URNS HPF: ABNORMAL /HPF
BASOPHILS # BLD AUTO: 0.06 K/UL (ref 0–0.2)
BASOPHILS NFR BLD: 0.8 % (ref 0–1.9)
BILIRUB SERPL-MCNC: 0.4 MG/DL (ref 0.1–1)
BILIRUB UR QL STRIP: NEGATIVE
BUN SERPL-MCNC: 6 MG/DL (ref 6–20)
CALCIUM SERPL-MCNC: 9.6 MG/DL (ref 8.7–10.5)
CHLORIDE SERPL-SCNC: 102 MMOL/L (ref 95–110)
CLARITY UR: CLEAR
CO2 SERPL-SCNC: 23 MMOL/L (ref 23–29)
COLOR UR: YELLOW
CREAT SERPL-MCNC: 0.9 MG/DL (ref 0.5–1.4)
DIFFERENTIAL METHOD: ABNORMAL
EOSINOPHIL # BLD AUTO: 0.3 K/UL (ref 0–0.5)
EOSINOPHIL NFR BLD: 3.6 % (ref 0–8)
ERYTHROCYTE [DISTWIDTH] IN BLOOD BY AUTOMATED COUNT: 13.7 % (ref 11.5–14.5)
EST. GFR  (NO RACE VARIABLE): >60 ML/MIN/1.73 M^2
GLUCOSE SERPL-MCNC: 105 MG/DL (ref 70–110)
GLUCOSE UR QL STRIP: NEGATIVE
HCT VFR BLD AUTO: 35.2 % (ref 37–48.5)
HGB BLD-MCNC: 11.5 G/DL (ref 12–16)
HGB UR QL STRIP: ABNORMAL
IMM GRANULOCYTES # BLD AUTO: 0.03 K/UL (ref 0–0.04)
IMM GRANULOCYTES NFR BLD AUTO: 0.4 % (ref 0–0.5)
INFLUENZA A, MOLECULAR: NEGATIVE
INFLUENZA B, MOLECULAR: NEGATIVE
KETONES UR QL STRIP: NEGATIVE
LACTATE SERPL-SCNC: 1.1 MMOL/L (ref 0.5–2.2)
LEUKOCYTE ESTERASE UR QL STRIP: NEGATIVE
LIPASE SERPL-CCNC: 89 U/L (ref 4–60)
LYMPHOCYTES # BLD AUTO: 1.4 K/UL (ref 1–4.8)
LYMPHOCYTES NFR BLD: 19 % (ref 18–48)
MCH RBC QN AUTO: 27.4 PG (ref 27–31)
MCHC RBC AUTO-ENTMCNC: 32.7 G/DL (ref 32–36)
MCV RBC AUTO: 84 FL (ref 82–98)
MICROSCOPIC COMMENT: ABNORMAL
MONOCYTES # BLD AUTO: 0.4 K/UL (ref 0.3–1)
MONOCYTES NFR BLD: 5.4 % (ref 4–15)
NEUTROPHILS # BLD AUTO: 5.3 K/UL (ref 1.8–7.7)
NEUTROPHILS NFR BLD: 70.8 % (ref 38–73)
NITRITE UR QL STRIP: NEGATIVE
NRBC BLD-RTO: 0 /100 WBC
PH UR STRIP: 8 [PH] (ref 5–8)
PLATELET # BLD AUTO: 775 K/UL (ref 150–450)
PMV BLD AUTO: 10.8 FL (ref 9.2–12.9)
POTASSIUM SERPL-SCNC: 4.2 MMOL/L (ref 3.5–5.1)
PROT SERPL-MCNC: 7.8 G/DL (ref 6–8.4)
PROT UR QL STRIP: NEGATIVE
RBC # BLD AUTO: 4.2 M/UL (ref 4–5.4)
RBC #/AREA URNS HPF: 5 /HPF (ref 0–4)
SARS-COV-2 RDRP RESP QL NAA+PROBE: POSITIVE
SODIUM SERPL-SCNC: 138 MMOL/L (ref 136–145)
SP GR UR STRIP: 1.02 (ref 1–1.03)
SPECIMEN SOURCE: NORMAL
URN SPEC COLLECT METH UR: ABNORMAL
UROBILINOGEN UR STRIP-ACNC: NEGATIVE EU/DL
WBC # BLD AUTO: 7.44 K/UL (ref 3.9–12.7)
WBC #/AREA URNS HPF: 4 /HPF (ref 0–5)

## 2022-08-24 PROCEDURE — U0002 COVID-19 LAB TEST NON-CDC: HCPCS | Performed by: PHYSICIAN ASSISTANT

## 2022-08-24 PROCEDURE — 81025 URINE PREGNANCY TEST: CPT | Performed by: PHYSICIAN ASSISTANT

## 2022-08-24 PROCEDURE — 80053 COMPREHEN METABOLIC PANEL: CPT | Performed by: PHYSICIAN ASSISTANT

## 2022-08-24 PROCEDURE — 85025 COMPLETE CBC W/AUTO DIFF WBC: CPT | Performed by: PHYSICIAN ASSISTANT

## 2022-08-24 PROCEDURE — 83605 ASSAY OF LACTIC ACID: CPT | Performed by: PHYSICIAN ASSISTANT

## 2022-08-24 PROCEDURE — 99284 EMERGENCY DEPT VISIT MOD MDM: CPT | Mod: 25

## 2022-08-24 PROCEDURE — 87502 INFLUENZA DNA AMP PROBE: CPT | Performed by: PHYSICIAN ASSISTANT

## 2022-08-24 PROCEDURE — 96375 TX/PRO/DX INJ NEW DRUG ADDON: CPT

## 2022-08-24 PROCEDURE — 83690 ASSAY OF LIPASE: CPT | Performed by: PHYSICIAN ASSISTANT

## 2022-08-24 PROCEDURE — 63600175 PHARM REV CODE 636 W HCPCS: Performed by: PHYSICIAN ASSISTANT

## 2022-08-24 PROCEDURE — 36415 COLL VENOUS BLD VENIPUNCTURE: CPT | Performed by: PHYSICIAN ASSISTANT

## 2022-08-24 PROCEDURE — 81000 URINALYSIS NONAUTO W/SCOPE: CPT | Performed by: PHYSICIAN ASSISTANT

## 2022-08-24 PROCEDURE — 25000003 PHARM REV CODE 250: Performed by: PHYSICIAN ASSISTANT

## 2022-08-24 PROCEDURE — 96374 THER/PROPH/DIAG INJ IV PUSH: CPT

## 2022-08-24 RX ORDER — ONDANSETRON 8 MG/1
8 TABLET, ORALLY DISINTEGRATING ORAL 3 TIMES DAILY PRN
Qty: 20 TABLET | Refills: 0 | Status: SHIPPED | OUTPATIENT
Start: 2022-08-24 | End: 2023-05-31

## 2022-08-24 RX ORDER — IBUPROFEN 600 MG/1
600 TABLET ORAL EVERY 8 HOURS PRN
Qty: 20 TABLET | Refills: 0 | Status: SHIPPED | OUTPATIENT
Start: 2022-08-24 | End: 2023-05-31

## 2022-08-24 RX ORDER — ONDANSETRON 2 MG/ML
4 INJECTION INTRAMUSCULAR; INTRAVENOUS
Status: COMPLETED | OUTPATIENT
Start: 2022-08-24 | End: 2022-08-24

## 2022-08-24 RX ORDER — KETOROLAC TROMETHAMINE 30 MG/ML
30 INJECTION, SOLUTION INTRAMUSCULAR; INTRAVENOUS
Status: COMPLETED | OUTPATIENT
Start: 2022-08-24 | End: 2022-08-24

## 2022-08-24 RX ADMIN — KETOROLAC TROMETHAMINE 30 MG: 30 INJECTION, SOLUTION INTRAMUSCULAR; INTRAVENOUS at 10:08

## 2022-08-24 RX ADMIN — SODIUM CHLORIDE 1000 ML: 0.9 INJECTION, SOLUTION INTRAVENOUS at 10:08

## 2022-08-24 RX ADMIN — ONDANSETRON 4 MG: 2 INJECTION INTRAMUSCULAR; INTRAVENOUS at 10:08

## 2022-08-24 NOTE — TELEPHONE ENCOUNTER
Reason for Disposition   SEVERE weakness (i.e., unable to walk or barely able to walk, requires support) and new-onset or worsening    Additional Information   Negative: SEVERE difficulty breathing (e.g., struggling for each breath, speaks in single words)   Negative: Shock suspected (e.g., cold/pale/clammy skin, too weak to stand, low BP, rapid pulse)   Negative: Difficult to awaken or acting confused (e.g., disoriented, slurred speech)   Negative: Fainted > 15 minutes ago and still feels too weak or dizzy to stand    Protocols used: WEAKNESS (GENERALIZED) AND FATIGUE-A-OH    Pt stated she was admitted to the hospital for pancreatitis. Stated she discharged Monday and was no longer sick. Pt stated she had a temp of 101.9 last night and has severe weakness that requires her to hold on to the wall to walk. Pt stated she feels very ill. Per triage protocol pt advised to dial 911. Pt refused and stated she'll have someone bring her to the ED.

## 2022-08-24 NOTE — ED PROVIDER NOTES
Encounter Date: 8/24/2022    SCRIBE #1 NOTE: Yue SANTORO am scribing for, and in the presence of, Teetee Stark PA-C.       History     Chief Complaint   Patient presents with    Post-op Problem    Abdominal Pain    Fever    Vomiting     Time seen by provider: 9:29 AM on 08/24/2022    Mena Ovalle is a 26 y.o. female with a history of acute cholecystitis with choledocholithiasis and pancreatitis about one week ago. The patient had a cholecystectomy followed by ERCP with sphincterotomy stone extraction and stent placement. She was readmitted with persistent pancreatitis end of last week and through the weekend. CXR on 8/16/22 was suspicious for pneumonia. Patient was evaluated by Dr. Mendoza, general surgeon in clinic yesterday. At that visit, her abdominal pain had improved but notes she was still feeling generally weak and lightheaded. Last night, she spiked a fever of 101.9F and started with worsening left sided abdominal pain that radiates to the left flank. Patient took a pain pill then soonafter became diaphoretic and started vomiting. Also notes she feels congested. Patient denies dysuria, hematuria, or diarrhea. She is not currently taking antibiotics.    The history is provided by the patient.     Review of patient's allergies indicates:  No Known Allergies  Past Medical History:   Diagnosis Date    Abnormal Pap smear of cervix     repeat pap    Bronchitis     COVID 01/18/2022    Family history of pulmonary embolism     MOTHER with MTHFR, PE found after knee replacement.     Iron deficiency anemia 5/17/2018    Pneumonia      Past Surgical History:   Procedure Laterality Date    ERCP N/A 8/10/2022    Procedure: ERCP (ENDOSCOPIC RETROGRADE CHOLANGIOPANCREATOGRAPHY);  Surgeon: Ruddy Jessica III, MD;  Location: [facility];  Service: Endoscopy;  Laterality: N/A;    LAPAROSCOPIC CHOLECYSTECTOMY N/A 8/8/2022    Procedure: CHOLECYSTECTOMY, LAPAROSCOPIC;  Surgeon: Virgil SMITH  Kelly SUERO MD;  Location: [facility];  Service: General;  Laterality: N/A;    WISDOM TOOTH EXTRACTION  11/15/2019     Family History   Problem Relation Age of Onset    Breast cancer Maternal Grandmother     Lymphoma Maternal Grandmother     Cervical cancer Maternal Grandmother     Pulmonary embolism Mother         MTHFR    Glaucoma Neg Hx     Retinal detachment Neg Hx     Macular degeneration Neg Hx     Ovarian cancer Neg Hx      Social History     Tobacco Use    Smoking status: Never Smoker    Smokeless tobacco: Never Used   Substance Use Topics    Alcohol use: Yes    Drug use: No     Review of Systems   Constitutional: Positive for diaphoresis and fever. Negative for chills.   HENT: Positive for congestion. Negative for facial swelling and trouble swallowing.    Respiratory: Negative for cough, chest tightness, shortness of breath and wheezing.    Cardiovascular: Negative for chest pain and palpitations.   Gastrointestinal: Positive for abdominal pain, nausea and vomiting. Negative for diarrhea.   Genitourinary: Positive for flank pain. Negative for dysuria and hematuria.   Musculoskeletal: Negative for arthralgias, back pain, joint swelling, myalgias, neck pain and neck stiffness.   Skin: Negative for color change, pallor, rash and wound.   Neurological: Positive for weakness and light-headedness. Negative for syncope, numbness and headaches.   Hematological: Does not bruise/bleed easily.   Psychiatric/Behavioral: The patient is not nervous/anxious.    All other systems reviewed and are negative.      Physical Exam     Initial Vitals [08/24/22 0846]   BP Pulse Resp Temp SpO2   (!) 151/97 94 20 98.5 °F (36.9 °C) 100 %      MAP       --         Physical Exam    Nursing note and vitals reviewed.  Constitutional: She appears well-developed and well-nourished. She is not diaphoretic. No distress.   HENT:   Head: Normocephalic and atraumatic.   Nasal congestion and rhinorrhea noted.  Mild erythema noted to  posterior oropharynx without edema or exudate.      Eyes: Conjunctivae are normal.   Neck: Neck supple.   Normal range of motion.  Cardiovascular: Normal rate, regular rhythm, normal heart sounds and intact distal pulses. Exam reveals no gallop and no friction rub.    No murmur heard.  Pulmonary/Chest: Breath sounds normal. No respiratory distress. She has no wheezes. She has no rhonchi. She has no rales.   Equal, bilateral breath sounds noted without wheezing.     Abdominal: Abdomen is soft. She exhibits no distension and no mass. There is abdominal tenderness.   Surgical incisions intact without surrounding erythema, induration, bleeding or discharge.  Minimal generalized abdominal tenderness noted.    Musculoskeletal:         General: No tenderness or edema. Normal range of motion.      Cervical back: Normal range of motion and neck supple.     Neurological: She is alert and oriented to person, place, and time. She has normal strength. No sensory deficit.   Skin: Skin is warm and dry. No rash and no abscess noted. No erythema.   Psychiatric: She has a normal mood and affect.         ED Course   Procedures  Labs Reviewed   CBC W/ AUTO DIFFERENTIAL - Abnormal; Notable for the following components:       Result Value    Hemoglobin 11.5 (*)     Hematocrit 35.2 (*)     Platelets 775 (*)     All other components within normal limits   COMPREHENSIVE METABOLIC PANEL - Abnormal; Notable for the following components:    Albumin 3.1 (*)     ALT 59 (*)     All other components within normal limits   LIPASE - Abnormal; Notable for the following components:    Lipase 89 (*)     All other components within normal limits   URINALYSIS, REFLEX TO URINE CULTURE - Abnormal; Notable for the following components:    Occult Blood UA 3+ (*)     All other components within normal limits    Narrative:     Specimen Source->Urine   SARS-COV-2 RNA AMPLIFICATION, QUAL - Abnormal; Notable for the following components:    SARS-CoV-2 RNA,  Amplification, Qual Positive (*)     All other components within normal limits   URINALYSIS MICROSCOPIC - Abnormal; Notable for the following components:    RBC, UA 5 (*)     All other components within normal limits    Narrative:     Specimen Source->Urine   INFLUENZA A & B BY MOLECULAR   PREGNANCY TEST, URINE RAPID    Narrative:     Specimen Source->Urine   LACTIC ACID, PLASMA          Imaging Results          X-Ray Chest AP Portable (Final result)  Result time 08/24/22 10:12:40    Final result by Tierra Robin MD (08/24/22 10:12:40)                 Impression:      Improved aeration of the left lung base suggesting decreasing effusion and infiltrate/atelectasis.  Right lung remains clear.      Electronically signed by: Tierra Robin MD  Date:    08/24/2022  Time:    10:12             Narrative:    EXAMINATION:  XR CHEST AP PORTABLE    CLINICAL HISTORY:  Fever, unspecified    TECHNIQUE:  Single frontal view of the chest was performed.    COMPARISON:  08/16/2022    FINDINGS:  Slight elevation the left hemidiaphragm and indistinctness of the left lateral costophrenic sulcus.  Right lung is clear and the right costophrenic sulcus is sharp.  The cardiomediastinal silhouette is stable.  There is improved aeration of the left lung base compared to the prior exam with that hemidiaphragm sharper today.                                 Medications   ondansetron injection 4 mg (4 mg Intravenous Given 8/24/22 1002)   sodium chloride 0.9% bolus 1,000 mL (0 mLs Intravenous Stopped 8/24/22 1102)   ketorolac injection 30 mg (30 mg Intravenous Given 8/24/22 1042)     Medical Decision Making:   History:   Old Medical Records: I decided to obtain old medical records.  Differential Diagnosis:   COVID-19   Pneumonia   UTI   Pyelonephritis   Acute abdomen   Clinical Tests:   Lab Tests: Ordered and Reviewed  Radiological Study: Ordered and Reviewed       APC / Resident Notes:   COVID-19 is positive and is likely the main  contributing factor to her symptoms.  Remaining labs stable without elevated WBCs.  Normal renal function.  Improving CXR.  Negative UA.  Improving lipase.  She will be discharged home to follow-up with her PCP as needed.  No need for repeat imaging of her abdomen/pelvis at this time.  Surgical incisions appear to be healing well without evidence for infection or seroma.  She voices understanding and is agreeable to the plan.  She is given specific return precautions.         Scribe Attestation:   Scribe #1: I performed the above scribed service and the documentation accurately describes the services I performed. I attest to the accuracy of the note.               I, Teetee Stark PA-C, personally performed the services described in this documentation. All medical record entries made by the scribe were at my direction and in my presence.  I have reviewed the chart and agree that the record reflects my personal performance and is accurate and complete. Teetee Stark PA-C.  9:07 PM 08/24/2022    Clinical Impression:   Final diagnoses:  [R50.9] Fever  [U07.1] COVID-19 (Primary)  [R53.83] Fatigue, unspecified type  [R11.10] Vomiting, intractability of vomiting not specified, presence of nausea not specified, unspecified vomiting type          ED Disposition Condition    Discharge Stable        ED Prescriptions     Medication Sig Dispense Start Date End Date Auth. Provider    ibuprofen (ADVIL,MOTRIN) 600 MG tablet Take 1 tablet (600 mg total) by mouth every 8 (eight) hours as needed for Pain. 20 tablet 8/24/2022  Teetee Stark PA-C    ondansetron (ZOFRAN-ODT) 8 MG TbDL Take 1 tablet (8 mg total) by mouth 3 (three) times daily as needed (nausea). 20 tablet 8/24/2022  Teetee Stark PA-C        Follow-up Information     Follow up With Specialties Details Why Contact Info    [facility] Emergency Dept Emergency Medicine  As needed, If symptoms worsen [address] [place] [address]  [id number]  [phone]           Teetee Stark PA-C  08/24/22 2108

## 2022-08-25 ENCOUNTER — PATIENT OUTREACH (OUTPATIENT)
Dept: EMERGENCY MEDICINE | Facility: HOSPITAL | Age: 27
End: 2022-08-25
Payer: MEDICAID

## 2022-08-25 NOTE — PROGRESS NOTES
Individual Psychotherapy (PhD/LCSW)    4/21/2020    Site:  [facility]        Therapeutic Intervention: Met with patient.  This is a virtual visit and patient confirms she is in her home.  Outpatient - Supportive psychotherapy 45 min - CPT Code 96304 and Outpatient - Interactive psychotherapy 45 min - CPT code 46295    Chief complaint/reason for encounter: anxiety and interpersonal   Medical history:   Past Medical History:   Diagnosis Date    Abnormal Pap smear of cervix     repeat pap    Family history of pulmonary embolism     MOTHER with MTHFR, PE found after knee replacement.     Iron deficiency 5/17/2018    Iron deficiency     Iron deficiency anemia 5/17/2018       Medications:    Current Outpatient Medications:     iron-vitamin C 100-250 mg, ICAR-C, (ICAR-C) 100-250 mg Tab, Take 1 tablet by mouth once daily., Disp: 30 each, Rfl: 6    Family history of psychiatric illness:   Family History   Problem Relation Age of Onset    Breast cancer Maternal Grandmother     Lymphoma Maternal Grandmother     Cervical cancer Maternal Grandmother     Pulmonary embolism Mother         MTHFR    Glaucoma Neg Hx     Retinal detachment Neg Hx     Macular degeneration Neg Hx     Ovarian cancer Neg Hx        Social history (marriage, employment, etc.):   Social History   Tobacco Use    Smoking status: Never Smoker    Smokeless tobacco: Never Used   Substance Use Topics    Alcohol use: No    Drug use: No       Interval history and content of current session:   Is not doing well as she and  had a blow out and hurtful things were said.  Recommend when they are ready we can engage in couples therapy with an emphasis on Interpersonal Effectiveness Communication Strategies and she seems open to this.  Is overall managing well.  Will start to work with Mena next week on IE.  Encouraged her to reach out to [facility] for assistance.  Will see her as scheduled.     Treatment plan:  · Target symptoms: anxiety ,  adjustment  · Why chosen therapy is appropriate versus another modality: relevant to diagnosis, patient responds to this modality, evidence based practice  · Outcome monitoring methods: self-report, observation  · Therapeutic intervention type: insight oriented psychotherapy, behavior modifying psychotherapy, supportive psychotherapy    Risk parameters:  Patient reports no suicidal ideation  Patient reports no homicidal ideation  Patient reports no self-injurious behavior  Patient reports no violent behavior    Verbal deficits: None    Patient's response to intervention:  The patient's response to intervention is accepting, motivated.    Progress toward goals and other mental status changes:  The patient's progress toward goals is good.    Diagnosis:   1. Adjustment disorder, unspecified type        Plan:  individual psychotherapy    Return to clinic: 2 weeks    Length of Service (minutes): 45   Stage 8: Additional Anesthesia Type: 1% lidocaine with epinephrine

## 2022-08-25 NOTE — PROGRESS NOTES
Britney Harvey  ED Navigator  Emergency Department    Project: [facility] ED Navigator  Role: Community Health Worker    Date: 08/25/2022  Patient Name: Mena Ovalle  MRN: [id number]  PCP: Francisco Camargo III, MD    Assessment:     Mena Ovalle is a 26 y.o. female who has presented to ED for abdominal pain/fever/vomiting. Patient has visited the ED 2 times in the past 3 months. Patient did not contact PCP.     ED Navigator Initial Assessment    ED Navigator Enrollment Documentation  Consent to Services  Does patient consent to completing the assessment?: Yes  Contact  Method of Initial Contact: Phone  Transportation  Does the patient have issues with Transportation?: No  Does the patient have transportation to and from healthcare appointments?: Yes  Insurance Coverage  Do you have coverage/adequate coverage?: Yes  Type/kind of coverage: [place] [employer]  Is patient able to afford co-pays/deductibles?: Yes  Is patient able to afford HME or supplies?: Yes  Does patient have an established [facility] PCP?: Yes  Able to access?: Yes  Does the patient have a lack of adequate coverage?: No  Specialist Appointment  Did the patient come to the ED to see a specialist?: No  Does the patient have a pending specialist referral?: No  Does the patient have a specialist appointment made?: No  PCP Follow Up Appointment  Has the patient had an appointment with a primary care provider in the past year?: Yes  Provider: Francisco Camargo III, MD  Does the patient have a follow up appontment with a PCP?: No  Why does the patient not have a follow up scheduled?: Other (see comments)  Medications  Is patient able to afford medication?: Yes  Is patient unable to get medication due to lack of transportation?: No  Psychological  Does the patient have psycho-social concerns?: No  Food  Does the patient have concerns about food?: No  Communication/Education  Does the patient have limited English proficiency/English not  primary language?: No  Does patient have low literacy and/or low health literacy?: Yes  Does patient have concerns with care?: No  Does patient have dissatisfaction with care?: No  Other Financial Concerns  Does the patient have immediate financial distress?: No  Does the patient have general financial concerns?: No  Other Social Barriers/Concerns  Does the patient have any additional barriers or concerns?: None  Primary Barrier  Barriers identified: Patient identified no barriers to care  Root Cause of ED Utilization: Patient Knowledge/Low Health Literacy  Plan to address Patient Knowledge/Low Health Literacy: Provided information for [facility] 24/7 Nurse triage line [phone] or [facility] ([phone])  Next steps: Provided Education  Was education/educational materials provided surrounding PCP services/creating a medical home?: Yes Was education verbal or written?: Written   Was education/educational materials provided surrounding low cost, healthy foods?: Yes Was education verbal or written?: Written   Was education/educational materials provided surrounding other items? If so, use comment to explain.: Yes Was education verbal or written?: Written   Plan: Provided information for [facility] 24/7 Nurse triage line, [phone] or [facility] ([phone])  Expected Date of Follow Up 1: 9/22/22         Social History     Socioeconomic History    Marital status:    Tobacco Use    Smoking status: Never Smoker    Smokeless tobacco: Never Used   Substance and Sexual Activity    Alcohol use: Yes    Drug use: No    Sexual activity: Yes     Partners: Male     Social Determinants of Health     Financial Resource Strain: Low Risk     Difficulty of Paying Living Expenses: Not hard at all   Food Insecurity: No Food Insecurity    Worried About Running Out of Food in the Last Year: Never true    Ran Out of Food in the Last Year: Never true   Transportation Needs: No Transportation  Needs    Lack of Transportation (Medical): No    Lack of Transportation (Non-Medical): No   Physical Activity: Inactive    Days of Exercise per Week: 0 days    Minutes of Exercise per Session: 0 min   Stress: No Stress Concern Present    Feeling of Stress : Not at all   Social Connections: Unknown    Frequency of Communication with Friends and Family: Three times a week    Frequency of Social Gatherings with Friends and Family: Three times a week    Active Member of Clubs or Organizations: Yes    Attends Club or Organization Meetings: More than 4 times per year    Marital Status:    Housing Stability: Unknown    Unable to Pay for Housing in the Last Year: No    Unstable Housing in the Last Year: No       Plan:   ED Navigator spoke with patient on the telephone and completed initial assessment.  Patient denied any concerns with food, housing, utilities, or transportation.  Patient stated she has a PCP but has not seen him in a while.  Patient reported she has all other providers in place as well.  Patient stated she was recently discharged from the hospital due to pancreatitis and was in the hospital for two weeks.  Patient is not a smoker.  Patient was given education on ([facility] information, [facility] Virtual [facility] information, and Heart healthy diet tips), [facility] Nurse Triage line, and Covid resources.    Britney Harvey  ED Navigator

## 2022-09-02 ENCOUNTER — OFFICE VISIT (OUTPATIENT)
Dept: FAMILY MEDICINE | Facility: CLINIC | Age: 27
End: 2022-09-02
Payer: MEDICAID

## 2022-09-02 ENCOUNTER — TELEPHONE (OUTPATIENT)
Dept: FAMILY MEDICINE | Facility: CLINIC | Age: 27
End: 2022-09-02
Payer: MEDICAID

## 2022-09-02 VITALS
SYSTOLIC BLOOD PRESSURE: 122 MMHG | WEIGHT: 189.63 LBS | HEIGHT: 67 IN | OXYGEN SATURATION: 98 % | TEMPERATURE: 98 F | DIASTOLIC BLOOD PRESSURE: 68 MMHG | HEART RATE: 81 BPM | BODY MASS INDEX: 29.76 KG/M2

## 2022-09-02 DIAGNOSIS — R74.01 TRANSAMINITIS: ICD-10-CM

## 2022-09-02 DIAGNOSIS — R10.84 GENERALIZED ABDOMINAL PAIN: ICD-10-CM

## 2022-09-02 DIAGNOSIS — E66.3 OVERWEIGHT (BMI 25.0-29.9): ICD-10-CM

## 2022-09-02 DIAGNOSIS — Z09 HOSPITAL DISCHARGE FOLLOW-UP: Primary | ICD-10-CM

## 2022-09-02 DIAGNOSIS — G43.709 CHRONIC MIGRAINE WITHOUT AURA WITHOUT STATUS MIGRAINOSUS, NOT INTRACTABLE: ICD-10-CM

## 2022-09-02 PROCEDURE — 3078F PR MOST RECENT DIASTOLIC BLOOD PRESSURE < 80 MM HG: ICD-10-PCS | Mod: CPTII,,, | Performed by: NURSE PRACTITIONER

## 2022-09-02 PROCEDURE — 1159F PR MEDICATION LIST DOCUMENTED IN MEDICAL RECORD: ICD-10-PCS | Mod: CPTII,,, | Performed by: NURSE PRACTITIONER

## 2022-09-02 PROCEDURE — 1111F PR DISCHARGE MEDS RECONCILED W/ CURRENT OUTPATIENT MED LIST: ICD-10-PCS | Mod: CPTII,,, | Performed by: NURSE PRACTITIONER

## 2022-09-02 PROCEDURE — 1159F MED LIST DOCD IN RCRD: CPT | Mod: CPTII,,, | Performed by: NURSE PRACTITIONER

## 2022-09-02 PROCEDURE — 99213 OFFICE O/P EST LOW 20 MIN: CPT | Mod: PBBFAC,PO | Performed by: NURSE PRACTITIONER

## 2022-09-02 PROCEDURE — 99999 PR PBB SHADOW E&M-EST. PATIENT-LVL III: ICD-10-PCS | Mod: PBBFAC,,, | Performed by: NURSE PRACTITIONER

## 2022-09-02 PROCEDURE — 3078F DIAST BP <80 MM HG: CPT | Mod: CPTII,,, | Performed by: NURSE PRACTITIONER

## 2022-09-02 PROCEDURE — 99999 PR PBB SHADOW E&M-EST. PATIENT-LVL III: CPT | Mod: PBBFAC,,, | Performed by: NURSE PRACTITIONER

## 2022-09-02 PROCEDURE — 3044F HG A1C LEVEL LT 7.0%: CPT | Mod: CPTII,,, | Performed by: NURSE PRACTITIONER

## 2022-09-02 PROCEDURE — 1111F DSCHRG MED/CURRENT MED MERGE: CPT | Mod: CPTII,,, | Performed by: NURSE PRACTITIONER

## 2022-09-02 PROCEDURE — 99214 PR OFFICE/OUTPT VISIT, EST, LEVL IV, 30-39 MIN: ICD-10-PCS | Mod: S$PBB,,, | Performed by: NURSE PRACTITIONER

## 2022-09-02 PROCEDURE — 3008F PR BODY MASS INDEX (BMI) DOCUMENTED: ICD-10-PCS | Mod: CPTII,,, | Performed by: NURSE PRACTITIONER

## 2022-09-02 PROCEDURE — 1160F RVW MEDS BY RX/DR IN RCRD: CPT | Mod: CPTII,,, | Performed by: NURSE PRACTITIONER

## 2022-09-02 PROCEDURE — 3044F PR MOST RECENT HEMOGLOBIN A1C LEVEL <7.0%: ICD-10-PCS | Mod: CPTII,,, | Performed by: NURSE PRACTITIONER

## 2022-09-02 PROCEDURE — 3074F SYST BP LT 130 MM HG: CPT | Mod: CPTII,,, | Performed by: NURSE PRACTITIONER

## 2022-09-02 PROCEDURE — 1160F PR REVIEW ALL MEDS BY PRESCRIBER/CLIN PHARMACIST DOCUMENTED: ICD-10-PCS | Mod: CPTII,,, | Performed by: NURSE PRACTITIONER

## 2022-09-02 PROCEDURE — 3008F BODY MASS INDEX DOCD: CPT | Mod: CPTII,,, | Performed by: NURSE PRACTITIONER

## 2022-09-02 PROCEDURE — 99214 OFFICE O/P EST MOD 30 MIN: CPT | Mod: S$PBB,,, | Performed by: NURSE PRACTITIONER

## 2022-09-02 PROCEDURE — 3074F PR MOST RECENT SYSTOLIC BLOOD PRESSURE < 130 MM HG: ICD-10-PCS | Mod: CPTII,,, | Performed by: NURSE PRACTITIONER

## 2022-09-02 RX ORDER — GABAPENTIN 100 MG/1
100 CAPSULE ORAL NIGHTLY
COMMUNITY
End: 2023-05-31

## 2022-09-02 RX ORDER — HYDROCODONE BITARTRATE AND ACETAMINOPHEN 5; 325 MG/1; MG/1
1 TABLET ORAL EVERY 8 HOURS PRN
Qty: 21 TABLET | Refills: 0 | Status: SHIPPED | OUTPATIENT
Start: 2022-09-02 | End: 2022-09-09

## 2022-09-02 RX ORDER — HYDROCODONE BITARTRATE AND ACETAMINOPHEN 5; 325 MG/1; MG/1
1 TABLET ORAL EVERY 6 HOURS PRN
Refills: 0 | Status: CANCELLED | OUTPATIENT
Start: 2022-09-02

## 2022-09-02 NOTE — PROGRESS NOTES
Subjective:       Patient ID: Mena Ovalle is a 26 y.o. female.    Chief Complaint: Follow-up (Hospital Follow up)    HPI    Mena Ovalle is a 26 y.o. female  whom presents today for follow up.A history of acute cholecystitis with choledocholithiasis and pancreatitis. The patient had a cholecystectomy followed by ERCP with sphincterotomy stone extraction and stent placement on 8/8/22.      8/24/22  ER visit :COVID-19 is positive and is likely the main contributing factor to her symptoms.  Remaining labs stable without elevated WBCs. She was readmitted with persistent pancreatitis end of last week and through the weekend. CXR on 8/16/22 was suspicious for pneumonia. Patient was evaluated by Dr. Mendoza, general surgeon in clinic yesterday. At that visit, her abdominal pain had improved but notes she was still feeling generally weak and lightheaded. 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     Iron deficiency anemia 5/17/2018    Pneumonia        Review of patient's allergies indicates:  No Known Allergies      Current Outpatient Medications:     gabapentin (NEURONTIN) 100 MG capsule, Take 100 mg by mouth 2 (two) times a day., Disp: , Rfl:     HYDROcodone-acetaminophen (NORCO) 5-325 mg per tablet, Take 1 tablet by mouth every 6 (six) hours as needed for Pain. (Patient not taking: Reported on 9/2/2022), Disp: 12 tablet, Rfl:  "0    HYDROcodone-acetaminophen (NORCO) 5-325 mg per tablet, Take 1 tablet by mouth every 8 (eight) hours as needed for Pain., Disp: 21 tablet, Rfl: 0    ibuprofen (ADVIL,MOTRIN) 600 MG tablet, Take 1 tablet (600 mg total) by mouth every 8 (eight) hours as needed for Pain. (Patient not taking: Reported on 9/2/2022), Disp: 20 tablet, Rfl: 0    ondansetron (ZOFRAN-ODT) 8 MG TbDL, Take 1 tablet (8 mg total) by mouth 3 (three) times daily as needed (nausea). (Patient not taking: Reported on 9/2/2022), Disp: 20 tablet, Rfl: 0    Review of Systems   Constitutional:  Negative for unexpected weight change.   HENT:  Negative for trouble swallowing.    Eyes:  Negative for visual disturbance.   Respiratory:  Negative for shortness of breath.    Cardiovascular:  Negative for chest pain, palpitations and leg swelling.   Gastrointestinal:  Negative for blood in stool.   Genitourinary:  Negative for hematuria.   Skin:  Negative for rash.   Allergic/Immunologic: Negative for immunocompromised state.   Neurological:  Negative for headaches.   Hematological:  Does not bruise/bleed easily.   Psychiatric/Behavioral:  Negative for agitation. The patient is not nervous/anxious.      Objective:      /68 (BP Location: Right arm, Patient Position: Sitting, BP Method: Small (Manual))   Pulse 81   Temp 98.3 °F (36.8 °C) (Oral)   Ht 5' 7" (1.702 m)   Wt 86 kg (189 lb 9.5 oz)   LMP 08/22/2022 (Exact Date)   SpO2 98%   BMI 29.69 kg/m²   Physical Exam  Constitutional:       Appearance: She is well-developed.   Eyes:      Pupils: Pupils are equal, round, and reactive to light.   Cardiovascular:      Rate and Rhythm: Normal rate and regular rhythm.      Heart sounds: Normal heart sounds.   Pulmonary:      Effort: Pulmonary effort is normal.      Breath sounds: Normal breath sounds.   Abdominal:      General: Bowel sounds are normal.      Palpations: Abdomen is soft.   Musculoskeletal:         General: Normal range of motion.      " "Cervical back: Normal range of motion.   Skin:     General: Skin is warm and dry.   Neurological:      Mental Status: She is alert and oriented to person, place, and time.   Psychiatric:         Behavior: Behavior normal.         Thought Content: Thought content normal.         Judgment: Judgment normal.       Assessment:       1. Hospital discharge follow-up    2. Generalized abdominal pain    3. Chronic migraine without aura without status migrainosus, not intractable    4. Transaminitis    5. Overweight (BMI 25.0-29.9)        Plan:       Hospital discharge follow-up  -     COMPREHENSIVE METABOLIC PANEL; Future; Expected date: 09/02/2022  -     CBC W/ AUTO DIFFERENTIAL; Future; Expected date: 09/02/2022  -     HYDROcodone-acetaminophen (NORCO) 5-325 mg per tablet; Take 1 tablet by mouth every 8 (eight) hours as needed for Pain.  Dispense: 21 tablet; Refill: 0  Acute biliary pancreatitis without infection or necrosis  repeat ERCP with stent removal in six weeks  Generalized abdominal pain  -     HYDROcodone-acetaminophen (NORCO) 5-325 mg per tablet; Take 1 tablet by mouth every 8 (eight) hours as needed for Pain.  Dispense: 21 tablet; Refill: 0    Chronic migraine without aura without status migrainosus, not intractable  Stable, continue management  Transaminitis       COMPREHENSIVE METABOLIC PANEL; Future; Expected date: 09/02/2022  -     CBC W/ AUTO DIFFERENTIAL  Overweight (BMI 25.0-29.9)    Counseled patient on his ideal body weight, health consequences of being obese and current recommendations including weekly exercise and a heart healthy diet.  Current BMI is:Estimated body mass index is 29.69 kg/m² as calculated from the following:    Height as of this encounter: 5' 7" (1.702 m).    Weight as of this encounter: 86 kg (189 lb 9.5 oz)..  Patient is aware that ideal BMI < 25 or Weight in (lb) to have BMI = 25: 159.3.       Time spent with patient: 20 minutes    Patient with be reevaluated in 3 months or sooner " prn    Greater than 50% of this visit was spent counseling as described in above documentation:Yes

## 2022-09-02 NOTE — TELEPHONE ENCOUNTER
----- Message from Pablo Chua MA sent at 9/2/2022 11:02 AM CDT -----  Contact: GEOFFREY ANGELES [[id number]]  Type: Needs Medical Advice    Who Called: GEOFFREY ANGELES [[id number]]  Best Call Back Number: [phone]  Inquiry/Question: Pt running late will be at office in 5 mins     Thank you~

## 2022-10-07 ENCOUNTER — HOSPITAL ENCOUNTER (OUTPATIENT)
Dept: RADIOLOGY | Facility: HOSPITAL | Age: 27
Discharge: HOME | End: 2022-10-07
Attending: INTERNAL MEDICINE
Payer: MEDICAID

## 2022-10-07 ENCOUNTER — HOSPITAL ENCOUNTER (OUTPATIENT)
Dept: PREADMISSION TESTING | Facility: HOSPITAL | Age: 27
Discharge: HOME | End: 2022-10-07
Attending: INTERNAL MEDICINE
Payer: MEDICAID

## 2022-10-07 VITALS
WEIGHT: 189.63 LBS | BODY MASS INDEX: 29.76 KG/M2 | SYSTOLIC BLOOD PRESSURE: 122 MMHG | DIASTOLIC BLOOD PRESSURE: 80 MMHG | TEMPERATURE: 98 F | HEIGHT: 67 IN | OXYGEN SATURATION: 97 % | RESPIRATION RATE: 18 BRPM | HEART RATE: 84 BPM

## 2022-10-07 DIAGNOSIS — Z01.818 PRE-OP TESTING: Primary | ICD-10-CM

## 2022-10-07 DIAGNOSIS — Z01.818 PRE-OP TESTING: ICD-10-CM

## 2022-10-07 PROCEDURE — 71046 X-RAY EXAM CHEST 2 VIEWS: CPT | Mod: TC

## 2022-10-07 NOTE — PRE-PROCEDURE INSTRUCTIONS
Pre procedure instructions given after review of history and medications.  NPO after midnight.  Will not take any meds am of procedure. Questions answered and verbalized understanding.

## 2022-10-07 NOTE — DISCHARGE INSTRUCTIONS
To confirm, Your doctor has instructed you that procedure is scheduled for: Oct 11    1 Person can come with you the day of surgery     Endoscopy nurse will call the afternoon prior to surgery with your arrival time.      Please  Enter at [address] [facility] [employer] [address] and come through front entrance.       Check in at registration.     After registration, proceed past gift shop and through glass door ( [facility] [place]) Check in at the nurses station to the left.     Do not eat or drink anything after midnight the night before your surgery - THIS INCLUDES  WATER, GUM, MINTS AND CANDY.  YOU MAY BRUSH YOUR TEETH BUT DO NOT SWALLOW       TAKE ONLY THESE MEDICATIONS WITH A SMALL SIP OF WATER THE MORNING OF YOUR PROCEDURE:    PLEASE NOTE:  The surgery schedule has many variables which may affect the time of your surgery case.  Family members should be available if your surgery time changes.  Plan to be here the day of your procedure between 4-6 hours.      DO NOT TAKE THESE MEDICATIONS 5-7 DAYS PRIOR to your procedure or per your surgeon's request: ASPIRIN, ALEVE, ADVIL, IBUPROFEN,  DANYELL SELTZER, BC , FISH OIL , VITAMIN E, HERBALS  (May take Tylenol)                                                      IMPORTANT INSTRUCTIONS      Do not smoke, vape or drink alcoholic beverages 24 hours prior to your procedure.  Shower the night before  and sleep in a bed with clean sheets.  Do not sleep with a pet in the bed.       If you wear contact lenses, dentures, hearing aids or glasses, bring a container to put them in during surgery and give to a family member for safe keeping.    Please leave all jewelry, piercing's and valuables at home.   Wear rubber soled shoes (no flip flops).  ONLY for patients requiring bowel prep, written instructions will be given by your doctor's office.  If your doctor has scheduled you for an overnight stay, bring a small overnight bag with any personal items you need.    Make arrangements in advance for  transportation home by a responsible adult.      You must make arrangements for transportation, TAXI'S, UBER'S OR LYFTS ARE NOT ALLOWED.        If you have any questions about these instructions, call (Monday - Friday)  Abuuuemjt [phone]

## 2022-10-11 ENCOUNTER — ANESTHESIA (OUTPATIENT)
Dept: SURGERY | Facility: HOSPITAL | Age: 27
End: 2022-10-11
Payer: MEDICAID

## 2022-10-11 ENCOUNTER — ANESTHESIA EVENT (OUTPATIENT)
Dept: SURGERY | Facility: HOSPITAL | Age: 27
End: 2022-10-11
Payer: MEDICAID

## 2022-10-11 ENCOUNTER — HOSPITAL ENCOUNTER (OUTPATIENT)
Facility: HOSPITAL | Age: 27
Discharge: HOME | End: 2022-10-11
Attending: INTERNAL MEDICINE | Admitting: INTERNAL MEDICINE
Payer: MEDICAID

## 2022-10-11 VITALS
HEIGHT: 67 IN | SYSTOLIC BLOOD PRESSURE: 122 MMHG | TEMPERATURE: 98 F | DIASTOLIC BLOOD PRESSURE: 77 MMHG | HEART RATE: 70 BPM | OXYGEN SATURATION: 97 % | BODY MASS INDEX: 29.19 KG/M2 | WEIGHT: 186 LBS | RESPIRATION RATE: 13 BRPM

## 2022-10-11 DIAGNOSIS — K85.90 PANCREATITIS: ICD-10-CM

## 2022-10-11 PROCEDURE — 25000003 PHARM REV CODE 250: Performed by: INTERNAL MEDICINE

## 2022-10-11 PROCEDURE — 37000008 HC ANESTHESIA 1ST 15 MINUTES: Performed by: INTERNAL MEDICINE

## 2022-10-11 PROCEDURE — 25500020 PHARM REV CODE 255: Performed by: INTERNAL MEDICINE

## 2022-10-11 PROCEDURE — 43276 ERCP STENT EXCHANGE W/DILATE: CPT | Performed by: INTERNAL MEDICINE

## 2022-10-11 PROCEDURE — 63600175 PHARM REV CODE 636 W HCPCS: Performed by: NURSE ANESTHETIST, CERTIFIED REGISTERED

## 2022-10-11 PROCEDURE — 37000009 HC ANESTHESIA EA ADD 15 MINS: Performed by: INTERNAL MEDICINE

## 2022-10-11 PROCEDURE — 63600175 PHARM REV CODE 636 W HCPCS: Performed by: ANESTHESIOLOGY

## 2022-10-11 PROCEDURE — 25000003 PHARM REV CODE 250: Performed by: NURSE ANESTHETIST, CERTIFIED REGISTERED

## 2022-10-11 PROCEDURE — C1769 GUIDE WIRE: HCPCS | Performed by: INTERNAL MEDICINE

## 2022-10-11 PROCEDURE — 27202125 HC BALLOON, EXTRACTION (ANY): Performed by: INTERNAL MEDICINE

## 2022-10-11 PROCEDURE — 00732 ANES UPR GI NDSC PX ERCP: CPT | Performed by: INTERNAL MEDICINE

## 2022-10-11 PROCEDURE — 27201089 HC SNARE, DISP (ANY): Performed by: INTERNAL MEDICINE

## 2022-10-11 PROCEDURE — 96374 THER/PROPH/DIAG INJ IV PUSH: CPT | Mod: 59 | Performed by: INTERNAL MEDICINE

## 2022-10-11 PROCEDURE — 25000003 PHARM REV CODE 250: Performed by: ANESTHESIOLOGY

## 2022-10-11 PROCEDURE — 43264 ERCP REMOVE DUCT CALCULI: CPT | Performed by: INTERNAL MEDICINE

## 2022-10-11 RX ORDER — FENTANYL CITRATE 50 UG/ML
25 INJECTION, SOLUTION INTRAMUSCULAR; INTRAVENOUS EVERY 5 MIN PRN
Status: COMPLETED | OUTPATIENT
Start: 2022-10-11 | End: 2022-10-11

## 2022-10-11 RX ORDER — ROCURONIUM BROMIDE 10 MG/ML
INJECTION, SOLUTION INTRAVENOUS
Status: DISCONTINUED | OUTPATIENT
Start: 2022-10-11 | End: 2022-10-11

## 2022-10-11 RX ORDER — OXYCODONE HYDROCHLORIDE 5 MG/1
5 TABLET ORAL
Status: DISCONTINUED | OUTPATIENT
Start: 2022-10-11 | End: 2022-10-11 | Stop reason: HOSPADM

## 2022-10-11 RX ORDER — PROPOFOL 10 MG/ML
VIAL (ML) INTRAVENOUS
Status: DISCONTINUED | OUTPATIENT
Start: 2022-10-11 | End: 2022-10-11

## 2022-10-11 RX ORDER — FENTANYL CITRATE 50 UG/ML
INJECTION, SOLUTION INTRAMUSCULAR; INTRAVENOUS
Status: DISCONTINUED | OUTPATIENT
Start: 2022-10-11 | End: 2022-10-11

## 2022-10-11 RX ORDER — FAMOTIDINE 10 MG/ML
INJECTION INTRAVENOUS
Status: DISCONTINUED | OUTPATIENT
Start: 2022-10-11 | End: 2022-10-11

## 2022-10-11 RX ORDER — SUCCINYLCHOLINE CHLORIDE 20 MG/ML
INJECTION INTRAMUSCULAR; INTRAVENOUS
Status: DISCONTINUED | OUTPATIENT
Start: 2022-10-11 | End: 2022-10-11

## 2022-10-11 RX ORDER — INDOMETHACIN 50 MG/1
SUPPOSITORY RECTAL
Status: COMPLETED | OUTPATIENT
Start: 2022-10-11 | End: 2022-10-11

## 2022-10-11 RX ORDER — SODIUM CHLORIDE, SODIUM LACTATE, POTASSIUM CHLORIDE, CALCIUM CHLORIDE 600; 310; 30; 20 MG/100ML; MG/100ML; MG/100ML; MG/100ML
INJECTION, SOLUTION INTRAVENOUS CONTINUOUS PRN
Status: DISCONTINUED | OUTPATIENT
Start: 2022-10-11 | End: 2022-10-11

## 2022-10-11 RX ORDER — MEPERIDINE HYDROCHLORIDE 50 MG/ML
12.5 INJECTION INTRAMUSCULAR; INTRAVENOUS; SUBCUTANEOUS EVERY 10 MIN PRN
Status: DISCONTINUED | OUTPATIENT
Start: 2022-10-11 | End: 2022-10-11 | Stop reason: HOSPADM

## 2022-10-11 RX ORDER — ONDANSETRON 2 MG/ML
INJECTION INTRAMUSCULAR; INTRAVENOUS
Status: DISCONTINUED | OUTPATIENT
Start: 2022-10-11 | End: 2022-10-11

## 2022-10-11 RX ORDER — DIPHENHYDRAMINE HYDROCHLORIDE 50 MG/ML
12.5 INJECTION INTRAMUSCULAR; INTRAVENOUS
Status: DISCONTINUED | OUTPATIENT
Start: 2022-10-11 | End: 2022-10-11 | Stop reason: HOSPADM

## 2022-10-11 RX ORDER — DEXAMETHASONE SODIUM PHOSPHATE 4 MG/ML
INJECTION, SOLUTION INTRA-ARTICULAR; INTRALESIONAL; INTRAMUSCULAR; INTRAVENOUS; SOFT TISSUE
Status: DISCONTINUED | OUTPATIENT
Start: 2022-10-11 | End: 2022-10-11

## 2022-10-11 RX ORDER — ONDANSETRON 2 MG/ML
4 INJECTION INTRAMUSCULAR; INTRAVENOUS DAILY PRN
Status: COMPLETED | OUTPATIENT
Start: 2022-10-11 | End: 2022-10-11

## 2022-10-11 RX ORDER — LIDOCAINE HYDROCHLORIDE 10 MG/ML
INJECTION, SOLUTION EPIDURAL; INFILTRATION; INTRACAUDAL; PERINEURAL
Status: DISCONTINUED | OUTPATIENT
Start: 2022-10-11 | End: 2022-10-11

## 2022-10-11 RX ADMIN — ROCURONIUM BROMIDE 5 MG: 10 INJECTION, SOLUTION INTRAVENOUS at 02:10

## 2022-10-11 RX ADMIN — LIDOCAINE HYDROCHLORIDE 50 MG: 10 INJECTION, SOLUTION EPIDURAL; INFILTRATION; INTRACAUDAL; PERINEURAL at 02:10

## 2022-10-11 RX ADMIN — FENTANYL CITRATE 25 MCG: 50 INJECTION, SOLUTION INTRAMUSCULAR; INTRAVENOUS at 03:10

## 2022-10-11 RX ADMIN — ROCURONIUM BROMIDE 30 MG: 10 INJECTION, SOLUTION INTRAVENOUS at 02:10

## 2022-10-11 RX ADMIN — ONDANSETRON 4 MG: 2 INJECTION INTRAMUSCULAR; INTRAVENOUS at 03:10

## 2022-10-11 RX ADMIN — ONDANSETRON 4 MG: 2 INJECTION INTRAMUSCULAR; INTRAVENOUS at 02:10

## 2022-10-11 RX ADMIN — PROPOFOL 140 MG: 10 INJECTION, EMULSION INTRAVENOUS at 02:10

## 2022-10-11 RX ADMIN — FAMOTIDINE 20 MG: 10 INJECTION, SOLUTION INTRAVENOUS at 02:10

## 2022-10-11 RX ADMIN — SUGAMMADEX 200 MG: 100 INJECTION, SOLUTION INTRAVENOUS at 02:10

## 2022-10-11 RX ADMIN — SODIUM CHLORIDE, SODIUM LACTATE, POTASSIUM CHLORIDE, AND CALCIUM CHLORIDE: .6; .31; .03; .02 INJECTION, SOLUTION INTRAVENOUS at 01:10

## 2022-10-11 RX ADMIN — FENTANYL CITRATE 50 MCG: 50 INJECTION INTRAMUSCULAR; INTRAVENOUS at 02:10

## 2022-10-11 RX ADMIN — DEXAMETHASONE SODIUM PHOSPHATE 4 MG: 4 INJECTION, SOLUTION INTRAMUSCULAR; INTRAVENOUS at 02:10

## 2022-10-11 RX ADMIN — SUCCINYLCHOLINE CHLORIDE 120 MG: 20 INJECTION, SOLUTION INTRAMUSCULAR; INTRAVENOUS at 02:10

## 2022-10-11 RX ADMIN — OXYCODONE HYDROCHLORIDE 5 MG: 5 TABLET ORAL at 03:10

## 2022-10-11 NOTE — [employer]ATION PATIENT INSTRUCTIONS
Discharge Summary/Instructions after an Endoscopic Procedure  Patient Name: Mena Ovalle  Patient MRN: [id number]  Patient   YOB: 1995 Tuesday, October 11, 2022  Ruddy Jessica III, MD  RESTRICTIONS:  During your procedure today, you received medications for sedation.  These   medications may affect your judgment, balance and coordination.  Therefore,   for 24 hours, you have the following restrictions:   - DO NOT drive a car, operate machinery, make legal/financial decisions,   sign important papers or drink alcohol.    ACTIVITY:  Today: no heavy lifting, straining or running due to procedural   sedation/anesthesia.  The following day: return to full activity including work.  DIET:  Eat and drink normally unless instructed otherwise.     TREATMENT FOR COMMON SIDE EFFECTS:  - Mild abdominal pain, nausea, belching, bloating or excessive gas:  rest,   eat lightly and use a heating pad.  - Sore Throat: treat with throat lozenges and/or gargle with warm salt   water.  - Because air was used during the procedure, expelling large amounts of air   from your rectum or belching is normal.  - If a bowel prep was taken, you may not have a bowel movement for 1-3 days.    This is normal.  SYMPTOMS TO WATCH FOR AND REPORT TO YOUR PHYSICIAN:  1. Abdominal pain or bloating, other than gas cramps.  2. Chest pain.  3. Back pain.  4. Signs of infection such as: chills or fever occurring within 24 hours   after the procedure.  5. Rectal bleeding, which would show as bright red, maroon, or black stools.   (A tablespoon of blood from the rectum is not serious, especially if   hemorrhoids are present.)  6. Vomiting.  7. Weakness or dizziness.  GO DIRECTLY TO THE NEAREST EMERGENCY ROOM IF YOU HAVE ANY OF THE FOLLOWING:      Difficulty breathing              Chills and/or fever over 101 F   Persistent vomiting and/or vomiting blood   Severe abdominal pain   Severe chest pain   Black, tarry stools   Bleeding- more than  one tablespoon   Any other symptom or condition that you feel may need urgent attention  Your doctor recommends these additional instructions:  If any biopsies were taken, your doctors clinic will contact you in 1 to 2   weeks with any results.  - Advance diet as tolerated.   - Continue present medications.   - Patient has a contact number available for emergencies.  The signs and   symptoms of potential delayed complications were discussed with the   patient.  Return to normal activities tomorrow.  Written discharge   instructions were provided to the patient.   - Discharge patient to home (with escort).  For questions, problems or results please call your physician - Ruddy Jessica III, MD at Work:  [phone].  Count includes the [facility], EMERGENCY ROOM PHONE NUMBER: [phone]  IF A COMPLICATION OR EMERGENCY SITUATION ARISES AND YOU ARE UNABLE TO REACH   YOUR PHYSICIAN - GO DIRECTLY TO THE EMERGENCY ROOM.  Ruddy Jessica III, MD  10/11/2022 2:37:47 PM  This report has been verified and signed electronically.  Dear patient,  As a result of recent federal legislation (The Federal Cures Act), you may   receive lab or pathology results from your procedure in your [facility]   account before your physician is able to contact you. Your physician or   their representative will relay the results to you with their   recommendations at their soonest availability.  Thank you,  [employer]ATION

## 2022-10-11 NOTE — ANESTHESIA POSTPROCEDURE EVALUATION
Anesthesia Post Evaluation    Patient: Mena Ovalle    Procedure(s) Performed: Procedure(s) (LRB):  ERCP (ENDOSCOPIC RETROGRADE CHOLANGIOPANCREATOGRAPHY) W/ STENT REMOVAL (N/A)    Final Anesthesia Type: general      Patient location during evaluation: PACU  Patient participation: Yes- Able to Participate  Level of consciousness: awake and alert and oriented  Post-procedure vital signs: reviewed and stable  Pain management: adequate  Airway patency: patent    PONV status at discharge: No PONV  Anesthetic complications: no      Cardiovascular status: blood pressure returned to baseline and hemodynamically stable  Respiratory status: unassisted, spontaneous ventilation and room air  Hydration status: euvolemic  Follow-up not needed.          Vitals Value Taken Time   /77 10/11/22 1545   Temp 36.7 °C (98 °F) 10/11/22 1500   Pulse 79 10/11/22 1551   Resp 17 10/11/22 1551   SpO2 97 % 10/11/22 1551   Vitals shown include unvalidated device data.      No case tracking events are documented in the log.      Pain/Wei Score: Pain Rating Prior to Med Admin: 4 (10/11/2022  3:42 PM)  Wei Score: 8 (10/11/2022  3:15 PM)

## 2022-10-11 NOTE — H&P
GASTROENTEROLOGY PRE-PROCEDURE H&P NOTE  Patient Name: Mena Ovalle  Patient MRN: [id number]  Patient : 1995    Service date: 10/11/2022    PCP: Francisco Camargo III, MD    No chief complaint on file.      HPI: Patient is a 27 y.o. female with PMHx as below here for evaluation of re-evalaution of bile leak.     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     Iron deficiency anemia 2018    Pancreatitis 2022    Pneumonia 2022    Sepsis, unspecified organism 2022        Past Surgical History:  Past Surgical History:   Procedure Laterality Date    ERCP N/A 08/10/2022    Procedure: ERCP (ENDOSCOPIC RETROGRADE CHOLANGIOPANCREATOGRAPHY);  Surgeon: Ruddy Jessica III, MD;  Location: [facility] ENDO;  Service: Endoscopy;  Laterality: N/A;    LAPAROSCOPIC CHOLECYSTECTOMY N/A 2022    Procedure: CHOLECYSTECTOMY, LAPAROSCOPIC;  Surgeon: Virgil Mendoza III, MD;  Location: [facility] OR;  Service: General;  Laterality: N/A;    WISDOM TOOTH EXTRACTION  11/15/2019        Home Medications:  Medications Prior to Admission   Medication Sig Dispense Refill Last Dose    gabapentin (NEURONTIN) 100 MG capsule Take 100 mg by mouth every evening.   10/10/2022    HYDROcodone-acetaminophen (NORCO) 5-325 mg per tablet Take 1 tablet by mouth every 6 (six) hours as needed for Pain. (Patient not taking: Reported on 2022) 12 tablet 0     ibuprofen (ADVIL,MOTRIN) 600 MG tablet Take 1 tablet (600 mg total) by mouth every 8 (eight) hours as needed for Pain. (Patient not taking: Reported on 2022) 20 tablet 0     ondansetron (ZOFRAN-ODT) 8 MG TbDL Take 1 tablet (8 mg total) by mouth 3 (three) times daily as needed (nausea). (Patient not taking: Reported on 2022) 20 tablet 0        Inpatient  "Medications:        Review of patient's allergies indicates:  No Known Allergies    Social History:   Social History     Occupational History    Not on file   Tobacco Use    Smoking status: Never    Smokeless tobacco: Never   Substance and Sexual Activity    Alcohol use: Yes    Drug use: No    Sexual activity: Yes     Partners: Male       Family History:   Family History   Problem Relation Age of Onset    Breast cancer Maternal Grandmother     Lymphoma Maternal Grandmother     Cervical cancer Maternal Grandmother     Pulmonary embolism Mother         MTHFR    Glaucoma Neg Hx     Retinal detachment Neg Hx     Macular degeneration Neg Hx     Ovarian cancer Neg Hx        Review of Systems:  A 10 point review of systems was performed and was normal, except as mentioned in the HPI, including constitutional, HEENT, heme, lymph, cardiovascular, respiratory, gastrointestinal, genitourinary, neurologic, endocrine, psychiatric and musculoskeletal.      OBJECTIVE:    Physical Exam:  24 Hour Vital Sign Ranges: Temp:  [98.8 °F (37.1 °C)] 98.8 °F (37.1 °C)  Pulse:  [96] 96  Resp:  [18] 18  SpO2:  [98 %] 98 %  BP: (111)/(78) 111/78  Most recent vitals: /78 (BP Location: Left arm, Patient Position: Sitting)   Pulse 96   Temp 98.8 °F (37.1 °C) (Skin)   Resp 18   Ht 5' 7" (1.702 m)   Wt 84.4 kg (186 lb)   SpO2 98%   Breastfeeding No   BMI 29.13 kg/m²    GEN: well-developed, well-nourished, awake and alert, non-toxic appearing adult  HEENT: PERRL, sclera anicteric, oral mucosa pink and moist without lesion  NECK: trachea midline; Good ROM  CV: regular rate and rhythm, no murmurs or gallops  RESP: clear to auscultation bilaterally, no wheezes, rhonci or rales  ABD: soft, non-tender, non-distended, normal bowel sounds  EXT: no swelling or edema, 2+ pulses distally  SKIN: no rashes or jaundice  PSYCH: normal affect    Labs:   No results for input(s): WBC, MCV, PLT in the last 72 hours.    Invalid input(s): HGBAU  No results " for input(s): NA, K, CL, CO2, BUN, GLU in the last 72 hours.    Invalid input(s): CREA  No results for input(s): ALB in the last 72 hours.    Invalid input(s): ALKP, SGOT, SGPT, TBIL, DBIL, TPRO  No results for input(s): PT, INR, PTT in the last 72 hours.      IMPRESSION / RECOMMENDATIONS:  ERCP w/ stent removal, balloon sweep  with interventions as warranted.   RIsks, benefits, alternatives discussed in detail regarding upcoming procedures and sedation. Some of the more common endoscopic complications include but not limited to immediate or delayed perforation, bleeding, infections, pain, inadvertent injury to surrounding tissue / organs anabell pancreas and possible need for surgical evaluation. Patient expressed understanding, all questions answered and will proceed with procedure as planned.     Ruddy Jessica III  10/11/2022  12:44 PM

## 2022-10-11 NOTE — ANESTHESIA PREPROCEDURE EVALUATION
10/11/2022  Mena Ovalle is a 27 y.o., female.      Patient Active Problem List   Diagnosis    Iron deficiency    Transaminitis    Acute pancreatitis    Obesity (BMI 30.0-34.9)    Anemia    Thrombocytosis       Past Surgical History:   Procedure Laterality Date    ERCP N/A 08/10/2022    Procedure: ERCP (ENDOSCOPIC RETROGRADE CHOLANGIOPANCREATOGRAPHY);  Surgeon: Ruddy Jessica III, MD;  Location: [facility] ENDO;  Service: Endoscopy;  Laterality: N/A;    LAPAROSCOPIC CHOLECYSTECTOMY N/A 08/08/2022    Procedure: CHOLECYSTECTOMY, LAPAROSCOPIC;  Surgeon: Virgil Mendoza III, MD;  Location: [facility];  Service: General;  Laterality: N/A;    WISDOM TOOTH EXTRACTION  11/15/2019        Tobacco Use:  The patient  reports that she has never smoked. She has never used smokeless tobacco.     Results for orders placed or performed during the hospital encounter of 08/16/22   EKG 12-lead    Collection Time: 08/16/22  5:19 PM    Narrative    Test Reason : R00.0,    Vent. Rate : 084 BPM     Atrial Rate : 084 BPM     P-R Int : 148 ms          QRS Dur : 086 ms      QT Int : 346 ms       P-R-T Axes : 044 063 002 degrees     QTc Int : 408 ms    Normal sinus rhythm  Nonspecific T wave abnormality  Abnormal ECG  No previous ECGs available  Confirmed by Jared COPPOLA, Eric FOUNTAIN ([id number]) on 8/21/2022 1:11:26 PM    Referred By: [email or web address]   SELF           Confirmed By:Eric Coleman MD             Lab Results   Component Value Date    WBC 6.65 10/07/2022    HGB 12.1 10/07/2022    HCT 37.4 10/07/2022    MCV 85 10/07/2022     10/07/2022     BMP  Lab Results   Component Value Date     (L) 10/07/2022    K 3.8 10/07/2022     10/07/2022    CO2 24 10/07/2022    BUN 11 10/07/2022    CREATININE 0.7 10/07/2022    CALCIUM 9.2 10/07/2022    ANIONGAP 7 (L) 10/07/2022    GLU 91 10/07/2022     08/24/2022    GLU  112 (H) 08/22/2022       No results found for this or any previous visit.              Pre-op Assessment    I have reviewed the Patient Summary Reports.     I have reviewed the Nursing Notes. I have reviewed the NPO Status.   I have reviewed the Medications.     Review of Systems  Anesthesia Hx:  No problems with previous Anesthesia  Denies Family Hx of Anesthesia complications.   Denies Personal Hx of Anesthesia complications.   Social:  Non-Smoker, No Alcohol Use    Hematology/Oncology:     Oncology Normal    -- Anemia (H/H wnl on recent CBC): Hematology Comments: thrombocytosis    EENT/Dental:EENT/Dental Normal   Cardiovascular:  Cardiovascular Normal     Pulmonary:  Pulmonary Normal    Renal/:  Renal/ Normal     Hepatic/GI:   Liver Disease, (hx of elevated LFTs, no recent labs) Hx of acute cholecystitis/pancreatitis August 2022    S/p cholecystectomy with subsequent ERCP in August   Musculoskeletal:  Musculoskeletal Normal    Neurological:   Headaches (hx migraine headaches)    Endocrine:  Endocrine Normal    Psych:  Psychiatric Normal           Physical Exam  General: Well nourished, Cooperative, Alert and Oriented  Patient appears sedated.  Airway:  Mallampati: III / II  Mouth Opening: Normal  TM Distance: > 6 cm  Tongue: Normal  Neck ROM: Normal ROM    Dental:  Intact    Chest/Lungs:  Clear to auscultation, Normal Respiratory Rate    Heart:  Rate: Normal  Rhythm: Regular Rhythm  Sounds: Normal        Anesthesia Plan  Type of Anesthesia, risks & benefits discussed:    Anesthesia Type: Gen ETT  Intra-op Monitoring Plan: Standard ASA Monitors  Post Op Pain Control Plan: multimodal analgesia  Induction:  IV  Airway Plan: , Post-Induction  Informed Consent: Informed consent signed with the Patient and all parties understand the risks and agree with anesthesia plan.  All questions answered.   ASA Score: 3 Emergent  Anesthesia Plan Notes:     GETA  No acetaminophen  Antiemetics:  Zofran in holding area, Pepcid,  Decadron 4 mg    Ready For Surgery From Anesthesia Perspective.     .

## 2022-10-11 NOTE — TRANSFER OF CARE
"Anesthesia Transfer of Care Note    Patient: Mena Ovalle    Procedure(s) Performed: Procedure(s) (LRB):  ERCP (ENDOSCOPIC RETROGRADE CHOLANGIOPANCREATOGRAPHY) W/ STENT REMOVAL (N/A)    Patient location: PACU    Anesthesia Type: general    Transport from OR: Transported from OR on room air with adequate spontaneous ventilation    Post pain: adequate analgesia    Post assessment: no apparent anesthetic complications    Post vital signs: stable    Level of consciousness: awake    Nausea/Vomiting: no nausea/vomiting    Complications: none    Transfer of care protocol was followed      Last vitals:   Visit Vitals  /78 (BP Location: Left arm, Patient Position: Sitting)   Pulse 96   Temp 37.1 °C (98.8 °F) (Skin)   Resp 18   Ht 5' 7" (1.702 m)   Wt 84.4 kg (186 lb)   SpO2 98%   Breastfeeding No   BMI 29.13 kg/m²     "

## 2022-11-14 ENCOUNTER — HOSPITAL ENCOUNTER (EMERGENCY)
Facility: HOSPITAL | Age: 27
Discharge: HOME | End: 2022-11-14
Attending: EMERGENCY MEDICINE
Payer: MEDICAID

## 2022-11-14 VITALS
HEART RATE: 78 BPM | WEIGHT: 186 LBS | HEIGHT: 67 IN | BODY MASS INDEX: 29.19 KG/M2 | SYSTOLIC BLOOD PRESSURE: 126 MMHG | OXYGEN SATURATION: 99 % | DIASTOLIC BLOOD PRESSURE: 78 MMHG | RESPIRATION RATE: 18 BRPM | TEMPERATURE: 98 F

## 2022-11-14 DIAGNOSIS — B34.9 VIRAL SYNDROME: Primary | ICD-10-CM

## 2022-11-14 LAB
ALBUMIN SERPL BCP-MCNC: 3.8 G/DL (ref 3.5–5.2)
ALP SERPL-CCNC: 88 U/L (ref 55–135)
ALT SERPL W/O P-5'-P-CCNC: 24 U/L (ref 10–44)
ANION GAP SERPL CALC-SCNC: 11 MMOL/L (ref 8–16)
AST SERPL-CCNC: 18 U/L (ref 10–40)
BASOPHILS # BLD AUTO: 0.03 K/UL (ref 0–0.2)
BASOPHILS NFR BLD: 0.3 % (ref 0–1.9)
BILIRUB SERPL-MCNC: 0.4 MG/DL (ref 0.1–1)
BUN SERPL-MCNC: 9 MG/DL (ref 6–20)
CALCIUM SERPL-MCNC: 9.2 MG/DL (ref 8.7–10.5)
CHLORIDE SERPL-SCNC: 103 MMOL/L (ref 95–110)
CO2 SERPL-SCNC: 23 MMOL/L (ref 23–29)
CREAT SERPL-MCNC: 0.8 MG/DL (ref 0.5–1.4)
DIFFERENTIAL METHOD: ABNORMAL
EOSINOPHIL # BLD AUTO: 0 K/UL (ref 0–0.5)
EOSINOPHIL NFR BLD: 0.2 % (ref 0–8)
ERYTHROCYTE [DISTWIDTH] IN BLOOD BY AUTOMATED COUNT: 13.2 % (ref 11.5–14.5)
EST. GFR  (NO RACE VARIABLE): >60 ML/MIN/1.73 M^2
GLUCOSE SERPL-MCNC: 100 MG/DL (ref 70–110)
GROUP A STREP, MOLECULAR: NEGATIVE
HCT VFR BLD AUTO: 39.8 % (ref 37–48.5)
HETEROPH AB SERPL QL IA: NEGATIVE
HGB BLD-MCNC: 12.8 G/DL (ref 12–16)
IMM GRANULOCYTES # BLD AUTO: 0.03 K/UL (ref 0–0.04)
IMM GRANULOCYTES NFR BLD AUTO: 0.3 % (ref 0–0.5)
INFLUENZA A, MOLECULAR: NEGATIVE
INFLUENZA B, MOLECULAR: NEGATIVE
LACTATE SERPL-SCNC: 0.8 MMOL/L (ref 0.5–2.2)
LACTATE SERPL-SCNC: 1 MMOL/L (ref 0.5–2.2)
LYMPHOCYTES # BLD AUTO: 1.4 K/UL (ref 1–4.8)
LYMPHOCYTES NFR BLD: 15.3 % (ref 18–48)
MCH RBC QN AUTO: 27.4 PG (ref 27–31)
MCHC RBC AUTO-ENTMCNC: 32.2 G/DL (ref 32–36)
MCV RBC AUTO: 85 FL (ref 82–98)
MONOCYTES # BLD AUTO: 0.9 K/UL (ref 0.3–1)
MONOCYTES NFR BLD: 10.2 % (ref 4–15)
NEUTROPHILS # BLD AUTO: 6.7 K/UL (ref 1.8–7.7)
NEUTROPHILS NFR BLD: 73.7 % (ref 38–73)
NRBC BLD-RTO: 0 /100 WBC
PLATELET # BLD AUTO: 285 K/UL (ref 150–450)
PMV BLD AUTO: 10.8 FL (ref 9.2–12.9)
POTASSIUM SERPL-SCNC: 4.2 MMOL/L (ref 3.5–5.1)
PROT SERPL-MCNC: 8.3 G/DL (ref 6–8.4)
RBC # BLD AUTO: 4.68 M/UL (ref 4–5.4)
SARS-COV-2 RDRP RESP QL NAA+PROBE: NEGATIVE
SODIUM SERPL-SCNC: 137 MMOL/L (ref 136–145)
SPECIMEN SOURCE: NORMAL
WBC # BLD AUTO: 9.09 K/UL (ref 3.9–12.7)

## 2022-11-14 PROCEDURE — 36415 COLL VENOUS BLD VENIPUNCTURE: CPT | Performed by: EMERGENCY MEDICINE

## 2022-11-14 PROCEDURE — 87502 INFLUENZA DNA AMP PROBE: CPT | Performed by: EMERGENCY MEDICINE

## 2022-11-14 PROCEDURE — U0002 COVID-19 LAB TEST NON-CDC: HCPCS | Performed by: EMERGENCY MEDICINE

## 2022-11-14 PROCEDURE — 63600175 PHARM REV CODE 636 W HCPCS: Performed by: EMERGENCY MEDICINE

## 2022-11-14 PROCEDURE — 87651 STREP A DNA AMP PROBE: CPT | Performed by: EMERGENCY MEDICINE

## 2022-11-14 PROCEDURE — 96361 HYDRATE IV INFUSION ADD-ON: CPT

## 2022-11-14 PROCEDURE — 85025 COMPLETE CBC W/AUTO DIFF WBC: CPT | Performed by: EMERGENCY MEDICINE

## 2022-11-14 PROCEDURE — 80053 COMPREHEN METABOLIC PANEL: CPT | Performed by: EMERGENCY MEDICINE

## 2022-11-14 PROCEDURE — 99284 EMERGENCY DEPT VISIT MOD MDM: CPT | Mod: 25

## 2022-11-14 PROCEDURE — 83605 ASSAY OF LACTIC ACID: CPT | Performed by: EMERGENCY MEDICINE

## 2022-11-14 PROCEDURE — 86308 HETEROPHILE ANTIBODY SCREEN: CPT | Performed by: EMERGENCY MEDICINE

## 2022-11-14 PROCEDURE — 25000003 PHARM REV CODE 250: Performed by: EMERGENCY MEDICINE

## 2022-11-14 PROCEDURE — 87040 BLOOD CULTURE FOR BACTERIA: CPT | Performed by: EMERGENCY MEDICINE

## 2022-11-14 PROCEDURE — 96375 TX/PRO/DX INJ NEW DRUG ADDON: CPT

## 2022-11-14 PROCEDURE — 96365 THER/PROPH/DIAG IV INF INIT: CPT

## 2022-11-14 RX ORDER — DROPERIDOL 2.5 MG/ML
1.25 INJECTION, SOLUTION INTRAMUSCULAR; INTRAVENOUS
Status: COMPLETED | OUTPATIENT
Start: 2022-11-14 | End: 2022-11-14

## 2022-11-14 RX ORDER — NAPROXEN 250 MG/1
500 TABLET ORAL
Status: DISCONTINUED | OUTPATIENT
Start: 2022-11-14 | End: 2022-11-14 | Stop reason: HOSPADM

## 2022-11-14 RX ORDER — LIDOCAINE HYDROCHLORIDE 20 MG/ML
5 SOLUTION OROPHARYNGEAL
Status: COMPLETED | OUTPATIENT
Start: 2022-11-14 | End: 2022-11-14

## 2022-11-14 RX ORDER — DEXAMETHASONE SODIUM PHOSPHATE 4 MG/ML
8 INJECTION, SOLUTION INTRA-ARTICULAR; INTRALESIONAL; INTRAMUSCULAR; INTRAVENOUS; SOFT TISSUE
Status: COMPLETED | OUTPATIENT
Start: 2022-11-14 | End: 2022-11-14

## 2022-11-14 RX ORDER — ONDANSETRON HYDROCHLORIDE 8 MG/1
8 TABLET, FILM COATED ORAL EVERY 12 HOURS PRN
Qty: 8 TABLET | Refills: 1 | Status: SHIPPED | OUTPATIENT
Start: 2022-11-14 | End: 2023-05-31

## 2022-11-14 RX ORDER — AMOXICILLIN AND CLAVULANATE POTASSIUM 875; 125 MG/1; MG/1
1 TABLET, FILM COATED ORAL 2 TIMES DAILY
Qty: 14 TABLET | Refills: 0 | Status: SHIPPED | OUTPATIENT
Start: 2022-11-14 | End: 2023-05-31

## 2022-11-14 RX ORDER — LIDOCAINE HYDROCHLORIDE 20 MG/ML
SOLUTION OROPHARYNGEAL EVERY 4 HOURS PRN
Qty: 100 ML | Refills: 0 | Status: SHIPPED | OUTPATIENT
Start: 2022-11-14 | End: 2023-05-31

## 2022-11-14 RX ORDER — PREDNISONE 20 MG/1
20 TABLET ORAL 2 TIMES DAILY
Qty: 10 TABLET | Refills: 0 | Status: SHIPPED | OUTPATIENT
Start: 2022-11-14 | End: 2022-11-19

## 2022-11-14 RX ADMIN — LIDOCAINE HYDROCHLORIDE 5 ML: 20 SOLUTION ORAL; TOPICAL at 05:11

## 2022-11-14 RX ADMIN — SODIUM CHLORIDE, SODIUM LACTATE, POTASSIUM CHLORIDE, AND CALCIUM CHLORIDE 1000 ML: .6; .31; .03; .02 INJECTION, SOLUTION INTRAVENOUS at 04:11

## 2022-11-14 RX ADMIN — SODIUM CHLORIDE, SODIUM LACTATE, POTASSIUM CHLORIDE, AND CALCIUM CHLORIDE 1000 ML: .6; .31; .03; .02 INJECTION, SOLUTION INTRAVENOUS at 06:11

## 2022-11-14 RX ADMIN — CEFTRIAXONE 1 G: 1 INJECTION, SOLUTION INTRAVENOUS at 06:11

## 2022-11-14 RX ADMIN — DROPERIDOL 1.25 MG: 2.5 INJECTION, SOLUTION INTRAMUSCULAR; INTRAVENOUS at 04:11

## 2022-11-14 RX ADMIN — ACETAMINOPHEN 650 MG: 325 SUPPOSITORY RECTAL at 05:11

## 2022-11-14 RX ADMIN — DEXAMETHASONE SODIUM PHOSPHATE 8 MG: 4 INJECTION, SOLUTION INTRA-ARTICULAR; INTRALESIONAL; INTRAMUSCULAR; INTRAVENOUS; SOFT TISSUE at 05:11

## 2022-11-14 NOTE — ED PROVIDER NOTES
Encounter Date: 11/14/2022    SCRIBE #1 NOTE: I, Mena Alex, am scribing for, and in the presence of,  Gregorio Kingston III, MD.     History     Chief Complaint   Patient presents with    Fever    Chills    Dizziness    Sore Throat     Time seen by provider: 3:51 PM on 11/14/2022    Mena Ovalle is a 27 y.o. female with a PMHx of iron deficiency, bronchitis, PNA, COVID-19, sepsis (08/2022) and pancreatitis who presents to the ED for evaluation of a subjective fever that onset 2 days ago with associated chills, dizziness, decreased appetite, sore throat, HA, lightheadedness, N/V, body aches, and sinus congestion.  Caregiver notes the patient had recent contact with others that are ill.  Patient has taken Tylenol without improvement to Sx.  She expresses concern for dehydration.  The patient denies diarrhea, SOB, coughing or any other symptoms at this time.  PSHx includes laparoscopic cholecystectomy (08/08/2022), ERCP (08/10/2022 and 10/11/2022).      The history is provided by the patient and a caregiver.   Review of patient's allergies indicates:  No Known Allergies  Past Medical History:   Diagnosis Date    Abnormal Pap smear of cervix     repeat pap    Bronchitis     COVID 08/24/2022 1/18/2022    Family history of pulmonary embolism     MOTHER with MTHFR, PE found after knee replacement.     Iron deficiency anemia 05/17/2018    Pancreatitis 08/2022    Pneumonia 08/2022    Sepsis, unspecified organism 08/2022     Past Surgical History:   Procedure Laterality Date    ERCP N/A 08/10/2022    Procedure: ERCP (ENDOSCOPIC RETROGRADE CHOLANGIOPANCREATOGRAPHY);  Surgeon: Ruddy Jessica III, MD;  Location: [facility];  Service: Endoscopy;  Laterality: N/A;    ERCP N/A 10/11/2022    Procedure: ERCP (ENDOSCOPIC RETROGRADE CHOLANGIOPANCREATOGRAPHY) W/ STENT REMOVAL;  Surgeon: Ruddy Jessica III, MD;  Location: [facility];  Service: Endoscopy;  Laterality: N/A;    LAPAROSCOPIC CHOLECYSTECTOMY N/A  08/08/2022    Procedure: CHOLECYSTECTOMY, LAPAROSCOPIC;  Surgeon: Virgil Mendoza III, MD;  Location: [facility];  Service: General;  Laterality: N/A;    WISDOM TOOTH EXTRACTION  11/15/2019     Family History   Problem Relation Age of Onset    Breast cancer Maternal Grandmother     Lymphoma Maternal Grandmother     Cervical cancer Maternal Grandmother     Pulmonary embolism Mother         MTHFR    Glaucoma Neg Hx     Retinal detachment Neg Hx     Macular degeneration Neg Hx     Ovarian cancer Neg Hx      Social History     Tobacco Use    Smoking status: Never    Smokeless tobacco: Never   Substance Use Topics    Alcohol use: Yes    Drug use: No     Review of Systems   Constitutional:  Positive for appetite change (decreased), chills and fever (subjective). Negative for activity change and fatigue.   HENT:  Positive for congestion (sinus) and sore throat.    Eyes:  Negative for visual disturbance.   Respiratory:  Negative for apnea, cough and shortness of breath.    Cardiovascular:  Negative for chest pain and palpitations.   Gastrointestinal:  Positive for nausea and vomiting. Negative for abdominal distention, abdominal pain and diarrhea.   Genitourinary:  Negative for difficulty urinating.   Musculoskeletal:  Positive for myalgias. Negative for neck pain.   Skin:  Negative for pallor and rash.   Neurological:  Positive for dizziness, light-headedness and headaches.   Hematological:  Does not bruise/bleed easily.   Psychiatric/Behavioral:  Negative for agitation.      Physical Exam     Initial Vitals [11/14/22 1542]   BP Pulse Resp Temp SpO2   119/78 (!) 114 18 99.7 °F (37.6 °C) 98 %      MAP       --         Physical Exam    Nursing note and vitals reviewed.  Constitutional: She appears well-developed and well-nourished.   HENT:   Head: Normocephalic and atraumatic.   Mouth/Throat: No posterior oropharyngeal edema or posterior oropharyngeal erythema.   Posterior oropharynx with erythema, swelling and exudate    Eyes: Conjunctivae are normal.   Neck: Neck supple.   Normal range of motion.  Cardiovascular:  Regular rhythm and normal heart sounds.   Tachycardia present.   Exam reveals no gallop and no friction rub.       No murmur heard.  Pulmonary/Chest: Effort normal and breath sounds normal. No respiratory distress. She has no wheezes. She has no rhonchi. She has no rales.   Abdominal: Abdomen is soft. She exhibits no distension. There is no abdominal tenderness.   Musculoskeletal:         General: Normal range of motion.      Cervical back: Normal range of motion and neck supple.     Lymphadenopathy:     She has cervical adenopathy (anterior).   Neurological: She is alert and oriented to person, place, and time.   Skin: Skin is warm and dry. No erythema.   Psychiatric: She has a normal mood and affect.       ED Course   Procedures  Labs Reviewed   CBC W/ AUTO DIFFERENTIAL - Abnormal; Notable for the following components:       Result Value    Gran % 73.7 (*)     Lymph % 15.3 (*)     All other components within normal limits   INFLUENZA A & B BY MOLECULAR   GROUP A STREP, MOLECULAR   CULTURE, BLOOD   CULTURE, BLOOD   COMPREHENSIVE METABOLIC PANEL   LACTIC ACID, PLASMA   SARS-COV-2 RNA AMPLIFICATION, QUAL   HETEROPHILE AB SCREEN   LACTIC ACID, PLASMA   URINALYSIS, REFLEX TO URINE CULTURE          Imaging Results    None          Medications   naproxen tablet 500 mg (500 mg Oral Not Given 11/14/22 1600)   lactated ringers bolus 1,000 mL (1,000 mLs Intravenous New Bag 11/14/22 1844)   lactated ringers bolus 1,000 mL (0 mLs Intravenous Stopped 11/14/22 1815)   droperidoL injection 1.25 mg (1.25 mg Intravenous Given 11/14/22 1642)   acetaminophen suppository 650 mg (650 mg Rectal Given 11/14/22 1723)   LIDOcaine HCl 2% oral solution 5 mL (5 mLs Oral Given 11/14/22 1728)   dexAMETHasone injection 8 mg (8 mg Intravenous Given 11/14/22 1754)   cefTRIAXone (ROCEPHIN) 1 g/50 mL D5W IVPB (1 g Intravenous New Bag 11/14/22 1846)      Medical Decision Making:   History:   Old Medical Records: I decided to obtain old medical records.  Clinical Tests:   Lab Tests: Ordered and Reviewed  ED Management:  27-year-old female presents with subjective fever that onset 2 days ago with associated chills, dizziness, decreased appetite, sore throat, HA, lightheadedness, N/V, body aches, and sinus congestion.  The symptoms suggest a viral syndrome; however, influenza and COVID are negative.  Pharyngeal exam does demonstrate symmetrical swelling with erythema and exudates.  She did take amoxicillin prior to arrival which may account for a false negative strep screen; therefore, she is treated with antibiotics.  She is encouraged to return for worsening symptoms.     APC / Resident Notes:   I, Dr. Gregorio Kingston III, personally performed the services described in this documentation. All medical record entries made by the scribe were at my direction and in my presence.  I have reviewed the chart and agree that the record reflects my personal performance and is accurate and complete   Scribe Attestation:   Scribe #1: I performed the above scribed service and the documentation accurately describes the services I performed. I attest to the accuracy of the note.                   Clinical Impression:   Final diagnoses:  [B34.9] Viral syndrome (Primary)      ED Disposition Condition    Discharge Stable          ED Prescriptions       Medication Sig Dispense Start Date End Date Auth. Provider    predniSONE (DELTASONE) 20 MG tablet Take 1 tablet (20 mg total) by mouth 2 (two) times daily. for 5 days 10 tablet 11/14/2022 11/19/2022 Gregorio Kingston III, MD    amoxicillin-clavulanate 875-125mg (AUGMENTIN) 875-125 mg per tablet Take 1 tablet by mouth 2 (two) times daily. 14 tablet 11/14/2022 -- Gregorio Kingston III, MD    LIDOcaine HCl 2% (LIDOCAINE VISCOUS) 2 % Soln by Mucous Membrane route every 4 (four) hours as needed. 100 mL 11/14/2022 -- Gregorio Kingston III, MD     ondansetron (ZOFRAN) 8 MG tablet Take 1 tablet (8 mg total) by mouth every 12 (twelve) hours as needed for Nausea. 8 tablet 11/14/2022 -- Gregorio Kingston III, MD          Follow-up Information    None          Gregorio Kingston III, MD  11/14/22 2023

## 2022-11-15 NOTE — ED NOTES
D/C instructions and Rx in pt possession along with belongings. No other needs at this time. Pt AAOx4, Abc's intact. NADN. No adverse reaction to medication given.

## 2022-11-15 NOTE — ED NOTES
Blood Cultures drawn. Pt aware of need for UA. ABC's intact. NADN. No adverse reaction to medication given.

## 2022-11-20 LAB
BACTERIA BLD CULT: NORMAL
BACTERIA BLD CULT: NORMAL

## 2022-11-22 ENCOUNTER — PATIENT MESSAGE (OUTPATIENT)
Dept: FAMILY MEDICINE | Facility: CLINIC | Age: 27
End: 2022-11-22
Payer: MEDICAID

## 2022-11-22 ENCOUNTER — TELEPHONE (OUTPATIENT)
Dept: SURGERY | Facility: CLINIC | Age: 27
End: 2022-11-22
Payer: MEDICAID

## 2022-11-22 DIAGNOSIS — K64.9 HEMORRHOIDS, UNSPECIFIED HEMORRHOID TYPE: Primary | ICD-10-CM

## 2022-11-22 NOTE — TELEPHONE ENCOUNTER
Appointment scheduled with Dr Mendoza 11am 12/13/22  Per request and placed on appt wait list for earlier appt.  Confirmed appt date and time.

## 2022-11-23 ENCOUNTER — TELEPHONE (OUTPATIENT)
Dept: FAMILY MEDICINE | Facility: CLINIC | Age: 27
End: 2022-11-23
Payer: MEDICAID

## 2022-11-23 NOTE — TELEPHONE ENCOUNTER
"GenSurg referral: spoke with patient. States "someone from 's office scheduled for 12/13/2022 1100". Appointment not showing in Epic. Staff message  to review and contact patient.    "

## 2022-12-09 ENCOUNTER — TELEPHONE (OUTPATIENT)
Dept: FAMILY MEDICINE | Facility: CLINIC | Age: 27
End: 2022-12-09
Payer: MEDICAID

## 2022-12-09 NOTE — TELEPHONE ENCOUNTER
[facility] referral: spoke with patient to reschedule appointment. Says issue cleared up. Told her referral valid one year is situation changes. She verbalizes understanding and thanks for the call.

## 2023-01-18 DIAGNOSIS — R20.2 PARESTHESIA OF BOTH LEGS: Primary | ICD-10-CM

## 2023-01-18 DIAGNOSIS — Z98.890 HISTORY OF EPIDURAL ANESTHESIA: ICD-10-CM

## 2023-01-18 DIAGNOSIS — M54.50 LOW BACK PAIN, UNSPECIFIED: ICD-10-CM

## 2023-01-28 ENCOUNTER — HOSPITAL ENCOUNTER (OUTPATIENT)
Dept: RADIOLOGY | Facility: HOSPITAL | Age: 28
Discharge: HOME | End: 2023-01-28
Attending: STUDENT IN AN ORGANIZED HEALTH CARE EDUCATION/TRAINING PROGRAM
Payer: MEDICAID

## 2023-01-28 DIAGNOSIS — Z98.890 HISTORY OF EPIDURAL ANESTHESIA: ICD-10-CM

## 2023-01-28 DIAGNOSIS — R20.2 PARESTHESIA OF BOTH LEGS: ICD-10-CM

## 2023-01-28 DIAGNOSIS — M54.50 LOW BACK PAIN, UNSPECIFIED: ICD-10-CM

## 2023-01-28 PROCEDURE — 72148 MRI LUMBAR SPINE W/O DYE: CPT | Mod: TC

## 2023-01-28 PROCEDURE — 72148 MRI LUMBAR SPINE W/O DYE: CPT | Mod: 26,,, | Performed by: RADIOLOGY

## 2023-01-28 PROCEDURE — 72148 MRI LUMBAR SPINE WITHOUT CONTRAST: ICD-10-PCS | Mod: 26,,, | Performed by: RADIOLOGY

## 2023-05-31 ENCOUNTER — OFFICE VISIT (OUTPATIENT)
Dept: FAMILY MEDICINE | Facility: CLINIC | Age: 28
End: 2023-05-31
Payer: MEDICAID

## 2023-05-31 VITALS
RESPIRATION RATE: 16 BRPM | HEIGHT: 67 IN | WEIGHT: 204.81 LBS | HEART RATE: 91 BPM | SYSTOLIC BLOOD PRESSURE: 116 MMHG | BODY MASS INDEX: 32.15 KG/M2 | TEMPERATURE: 98 F | DIASTOLIC BLOOD PRESSURE: 64 MMHG | OXYGEN SATURATION: 98 %

## 2023-05-31 DIAGNOSIS — E66.9 OBESITY (BMI 30.0-34.9): ICD-10-CM

## 2023-05-31 DIAGNOSIS — M54.50 CHRONIC BILATERAL LOW BACK PAIN WITHOUT SCIATICA: ICD-10-CM

## 2023-05-31 DIAGNOSIS — Z00.00 ANNUAL PHYSICAL EXAM: Primary | ICD-10-CM

## 2023-05-31 DIAGNOSIS — G89.29 CHRONIC BILATERAL LOW BACK PAIN WITHOUT SCIATICA: ICD-10-CM

## 2023-05-31 DIAGNOSIS — G43.709 CHRONIC MIGRAINE WITHOUT AURA WITHOUT STATUS MIGRAINOSUS, NOT INTRACTABLE: ICD-10-CM

## 2023-05-31 PROCEDURE — 1160F PR REVIEW ALL MEDS BY PRESCRIBER/CLIN PHARMACIST DOCUMENTED: ICD-10-PCS | Mod: CPTII,,, | Performed by: NURSE PRACTITIONER

## 2023-05-31 PROCEDURE — 1159F MED LIST DOCD IN RCRD: CPT | Mod: CPTII,,, | Performed by: NURSE PRACTITIONER

## 2023-05-31 PROCEDURE — 3074F PR MOST RECENT SYSTOLIC BLOOD PRESSURE < 130 MM HG: ICD-10-PCS | Mod: CPTII,,, | Performed by: NURSE PRACTITIONER

## 2023-05-31 PROCEDURE — 1159F PR MEDICATION LIST DOCUMENTED IN MEDICAL RECORD: ICD-10-PCS | Mod: CPTII,,, | Performed by: NURSE PRACTITIONER

## 2023-05-31 PROCEDURE — 1160F RVW MEDS BY RX/DR IN RCRD: CPT | Mod: CPTII,,, | Performed by: NURSE PRACTITIONER

## 2023-05-31 PROCEDURE — 3078F DIAST BP <80 MM HG: CPT | Mod: CPTII,,, | Performed by: NURSE PRACTITIONER

## 2023-05-31 PROCEDURE — 3008F BODY MASS INDEX DOCD: CPT | Mod: CPTII,,, | Performed by: NURSE PRACTITIONER

## 2023-05-31 PROCEDURE — 99395 PR PREVENTIVE VISIT,EST,18-39: ICD-10-PCS | Mod: S$PBB,,, | Performed by: NURSE PRACTITIONER

## 2023-05-31 PROCEDURE — 99999 PR PBB SHADOW E&M-EST. PATIENT-LVL III: CPT | Mod: PBBFAC,,, | Performed by: NURSE PRACTITIONER

## 2023-05-31 PROCEDURE — 3074F SYST BP LT 130 MM HG: CPT | Mod: CPTII,,, | Performed by: NURSE PRACTITIONER

## 2023-05-31 PROCEDURE — 3078F PR MOST RECENT DIASTOLIC BLOOD PRESSURE < 80 MM HG: ICD-10-PCS | Mod: CPTII,,, | Performed by: NURSE PRACTITIONER

## 2023-05-31 PROCEDURE — 99999 PR PBB SHADOW E&M-EST. PATIENT-LVL III: ICD-10-PCS | Mod: PBBFAC,,, | Performed by: NURSE PRACTITIONER

## 2023-05-31 PROCEDURE — 99213 OFFICE O/P EST LOW 20 MIN: CPT | Mod: PBBFAC,PO | Performed by: NURSE PRACTITIONER

## 2023-05-31 PROCEDURE — 3008F PR BODY MASS INDEX (BMI) DOCUMENTED: ICD-10-PCS | Mod: CPTII,,, | Performed by: NURSE PRACTITIONER

## 2023-05-31 PROCEDURE — 99395 PREV VISIT EST AGE 18-39: CPT | Mod: S$PBB,,, | Performed by: NURSE PRACTITIONER

## 2023-05-31 RX ORDER — GABAPENTIN 300 MG/1
300 CAPSULE ORAL NIGHTLY
Qty: 30 CAPSULE | Refills: 11 | Status: SHIPPED | OUTPATIENT
Start: 2023-05-31 | End: 2024-05-30

## 2023-05-31 NOTE — PROGRESS NOTES
Subjective:       Patient ID: Mena Ovalle is a 27 y.o. female.    Chief Complaint: No chief complaint on file.    HPI    Patient presents today for annual visit. She is due for labs. Patient has no complaints at today's visit.  Past Medical History:   Diagnosis Date    Abnormal Pap smear of cervix     repeat pap    Bronchitis     COVID 08/24/2022 1/18/2022    Family history of pulmonary embolism     MOTHER with MTHFR, PE found after knee replacement.     Iron deficiency anemia 05/17/2018    Pancreatitis 08/2022    Pneumonia 08/2022    Sepsis, unspecified organism 08/2022       Review of patient's allergies indicates:  No Known Allergies      Current Outpatient Medications:     amoxicillin-clavulanate 875-125mg (AUGMENTIN) 875-125 mg per tablet, Take 1 tablet by mouth 2 (two) times daily., Disp: 14 tablet, Rfl: 0    gabapentin (NEURONTIN) 300 MG capsule, Take 1 capsule (300 mg total) by mouth every evening., Disp: 30 capsule, Rfl: 11    HYDROcodone-acetaminophen (NORCO) 5-325 mg per tablet, Take 1 tablet by mouth every 6 (six) hours as needed for Pain., Disp: 12 tablet, Rfl: 0    ibuprofen (ADVIL,MOTRIN) 600 MG tablet, Take 1 tablet (600 mg total) by mouth every 8 (eight) hours as needed for Pain., Disp: 20 tablet, Rfl: 0    LIDOcaine HCl 2% (LIDOCAINE VISCOUS) 2 % Soln, by Mucous Membrane route every 4 (four) hours as needed., Disp: 100 mL, Rfl: 0    ondansetron (ZOFRAN) 8 MG tablet, Take 1 tablet (8 mg total) by mouth every 12 (twelve) hours as needed for Nausea., Disp: 8 tablet, Rfl: 1    ondansetron (ZOFRAN-ODT) 8 MG TbDL, Take 1 tablet (8 mg total) by mouth 3 (three) times daily as needed (nausea)., Disp: 20 tablet, Rfl: 0    Review of Systems   Constitutional:  Negative for unexpected weight change.   HENT:  Negative for trouble swallowing.    Eyes:  Negative for visual disturbance.   Respiratory:  Negative for shortness of breath.    Cardiovascular:  Negative for chest pain, palpitations and leg  "swelling.   Gastrointestinal:  Negative for blood in stool.   Genitourinary:  Negative for hematuria.   Skin:  Negative for rash.   Allergic/Immunologic: Negative for immunocompromised state.   Neurological:  Negative for headaches.   Hematological:  Does not bruise/bleed easily.   Psychiatric/Behavioral:  Negative for agitation. The patient is not nervous/anxious.      Objective:      /64 (BP Location: Left arm, Patient Position: Sitting, BP Method: Medium (Manual))   Pulse 91   Temp 98.4 °F (36.9 °C) (Oral)   Resp 16   Ht 5' 7" (1.702 m)   Wt 92.9 kg (204 lb 12.9 oz)   SpO2 98%   Breastfeeding No   BMI 32.08 kg/m²   Physical Exam  Constitutional:       Appearance: She is well-developed.   Eyes:      Pupils: Pupils are equal, round, and reactive to light.   Cardiovascular:      Rate and Rhythm: Normal rate and regular rhythm.      Heart sounds: Normal heart sounds.   Pulmonary:      Effort: Pulmonary effort is normal.      Breath sounds: Normal breath sounds.   Abdominal:      General: Bowel sounds are normal.      Palpations: Abdomen is soft.   Musculoskeletal:         General: Normal range of motion.      Cervical back: Normal range of motion.   Skin:     General: Skin is warm and dry.   Neurological:      Mental Status: She is alert and oriented to person, place, and time.   Psychiatric:         Behavior: Behavior normal.         Thought Content: Thought content normal.         Judgment: Judgment normal.       Assessment:       1. Annual physical exam    2. Chronic bilateral low back pain without sciatica    3. Chronic migraine without aura without status migrainosus, not intractable    4. Obesity (BMI 30.0-34.9)        Plan:       Annual physical exam  -     Lipid Panel; Future; Expected date: 05/31/2023  -     CBC W/ AUTO DIFFERENTIAL; Future; Expected date: 05/31/2023  -     COMPREHENSIVE METABOLIC PANEL; Future; Expected date: 05/31/2023  -     TSH; Future; Expected date: 05/31/2023    Chronic " "bilateral low back pain without sciatica  -     gabapentin (NEURONTIN) 300 MG capsule; Take 1 capsule (300 mg total) by mouth every evening.  Dispense: 30 capsule; Refill: 11    Chronic migraine without aura without status migrainosus, not intractable  Stable, continue management  Obesity (BMI 30.0-34.9)  Counseled patient on his ideal body weight, health consequences of being obese and current recommendations including weekly exercise and a heart healthy diet.  Current BMI is:Estimated body mass index is 32.08 kg/m² as calculated from the following:    Height as of this encounter: 5' 7" (1.702 m).    Weight as of this encounter: 92.9 kg (204 lb 12.9 oz)..  Patient is aware that ideal BMI < 25 or Weight in (lb) to have BMI = 25: 159.3.           Patient readiness: acceptance and barriers:none    During the course of the visit the patient was educated and counseled about the following:     Obesity:   General weight loss/lifestyle modification strategies discussed (elicit support from others; identify saboteurs; non-food rewards, etc).  Regular aerobic exercise program discussed.    Goals: Obesity: Reduce calorie intake and BMI    Did patient meet goals/outcomes: No    The following self management tools provided: declined    Patient Instructions (the written plan) was given to the patient/family.     Time spent with patient: 15 minutes    Barriers to medications present (no )    Adverse reactions to current medications (no)    Over the counter medications reviewed (Yes)          "

## 2023-06-13 ENCOUNTER — PATIENT MESSAGE (OUTPATIENT)
Dept: FAMILY MEDICINE | Facility: CLINIC | Age: 28
End: 2023-06-13
Payer: MEDICAID

## 2023-06-13 DIAGNOSIS — J02.9 SORE THROAT: Primary | ICD-10-CM

## 2023-06-13 NOTE — TELEPHONE ENCOUNTER
Hi patient hasnt been physically assessed by us in a year. Provider rafita has assessed patient since her visit here. Pt will need appointment with linda/murray/raheel  if you want us to handle it(: please let me know

## 2023-06-14 ENCOUNTER — PATIENT MESSAGE (OUTPATIENT)
Dept: FAMILY MEDICINE | Facility: CLINIC | Age: 28
End: 2023-06-14
Payer: MEDICAID

## 2023-06-14 DIAGNOSIS — J02.9 SORE THROAT: Primary | ICD-10-CM

## 2023-07-10 ENCOUNTER — PATIENT MESSAGE (OUTPATIENT)
Dept: FAMILY MEDICINE | Facility: CLINIC | Age: 28
End: 2023-07-10
Payer: MEDICAID

## 2023-07-10 DIAGNOSIS — M79.642 BILATERAL HAND PAIN: Primary | ICD-10-CM

## 2023-07-10 DIAGNOSIS — M79.641 BILATERAL HAND PAIN: Primary | ICD-10-CM

## 2023-07-12 ENCOUNTER — PATIENT MESSAGE (OUTPATIENT)
Dept: FAMILY MEDICINE | Facility: CLINIC | Age: 28
End: 2023-07-12
Payer: MEDICAID

## 2023-11-20 ENCOUNTER — PATIENT MESSAGE (OUTPATIENT)
Dept: FAMILY MEDICINE | Facility: CLINIC | Age: 28
End: 2023-11-20
Payer: MEDICAID

## 2024-04-04 ENCOUNTER — PATIENT MESSAGE (OUTPATIENT)
Dept: ADMINISTRATIVE | Facility: HOSPITAL | Age: 29
End: 2024-04-04
Payer: MEDICAID

## 2024-05-28 ENCOUNTER — PATIENT MESSAGE (OUTPATIENT)
Dept: FAMILY MEDICINE | Facility: CLINIC | Age: 29
End: 2024-05-28
Payer: MEDICAID

## 2024-06-08 ENCOUNTER — PATIENT MESSAGE (OUTPATIENT)
Dept: FAMILY MEDICINE | Facility: CLINIC | Age: 29
End: 2024-06-08
Payer: MEDICAID

## 2024-06-12 ENCOUNTER — OFFICE VISIT (OUTPATIENT)
Dept: FAMILY MEDICINE | Facility: CLINIC | Age: 29
End: 2024-06-12
Payer: MEDICAID

## 2024-06-12 VITALS
DIASTOLIC BLOOD PRESSURE: 82 MMHG | TEMPERATURE: 98 F | OXYGEN SATURATION: 97 % | HEIGHT: 67 IN | SYSTOLIC BLOOD PRESSURE: 126 MMHG | WEIGHT: 202.94 LBS | HEART RATE: 76 BPM | BODY MASS INDEX: 31.85 KG/M2

## 2024-06-12 DIAGNOSIS — G89.29 CHRONIC BILATERAL LOW BACK PAIN WITHOUT SCIATICA: ICD-10-CM

## 2024-06-12 DIAGNOSIS — Z00.00 ANNUAL PHYSICAL EXAM: ICD-10-CM

## 2024-06-12 DIAGNOSIS — G47.00 INSOMNIA, UNSPECIFIED TYPE: Primary | ICD-10-CM

## 2024-06-12 DIAGNOSIS — M54.50 CHRONIC BILATERAL LOW BACK PAIN WITHOUT SCIATICA: ICD-10-CM

## 2024-06-12 DIAGNOSIS — R53.83 FATIGUE, UNSPECIFIED TYPE: ICD-10-CM

## 2024-06-12 PROBLEM — K85.90 ACUTE PANCREATITIS: Status: RESOLVED | Noted: 2022-08-16 | Resolved: 2024-06-12

## 2024-06-12 PROCEDURE — 3008F BODY MASS INDEX DOCD: CPT | Mod: CPTII,,, | Performed by: NURSE PRACTITIONER

## 2024-06-12 PROCEDURE — 99395 PREV VISIT EST AGE 18-39: CPT | Mod: S$PBB,,, | Performed by: NURSE PRACTITIONER

## 2024-06-12 PROCEDURE — 99213 OFFICE O/P EST LOW 20 MIN: CPT | Mod: S$PBB,25,, | Performed by: NURSE PRACTITIONER

## 2024-06-12 PROCEDURE — 1159F MED LIST DOCD IN RCRD: CPT | Mod: CPTII,,, | Performed by: NURSE PRACTITIONER

## 2024-06-12 PROCEDURE — 3079F DIAST BP 80-89 MM HG: CPT | Mod: CPTII,,, | Performed by: NURSE PRACTITIONER

## 2024-06-12 PROCEDURE — 1160F RVW MEDS BY RX/DR IN RCRD: CPT | Mod: CPTII,,, | Performed by: NURSE PRACTITIONER

## 2024-06-12 PROCEDURE — 99213 OFFICE O/P EST LOW 20 MIN: CPT | Mod: PBBFAC,PN | Performed by: NURSE PRACTITIONER

## 2024-06-12 PROCEDURE — 99999 PR PBB SHADOW E&M-EST. PATIENT-LVL III: CPT | Mod: PBBFAC,,, | Performed by: NURSE PRACTITIONER

## 2024-06-12 PROCEDURE — 3074F SYST BP LT 130 MM HG: CPT | Mod: CPTII,,, | Performed by: NURSE PRACTITIONER

## 2024-06-12 RX ORDER — ZOLPIDEM TARTRATE 5 MG/1
5 TABLET ORAL NIGHTLY PRN
Qty: 30 TABLET | Refills: 5 | Status: SHIPPED | OUTPATIENT
Start: 2024-06-12 | End: 2024-12-11

## 2024-06-12 RX ORDER — GABAPENTIN 300 MG/1
300 CAPSULE ORAL NIGHTLY
Qty: 30 CAPSULE | Refills: 5 | Status: SHIPPED | OUTPATIENT
Start: 2024-06-12 | End: 2025-06-12

## 2024-06-12 NOTE — PROGRESS NOTES
SUBJECTIVE:      Patient ID: Mena Ovalle is a 28 y.o. female.    Chief Complaint: Follow-up (rxs) and Insomnia    HPI  Is here for follow up gabapentin for back spasms  And has insomnia-  checked  Nonsuicidal  Will try ambien and refill gabapentin  And order blood work to be done  Vss; bp is well controlled  Denies chest pain  Denies sob  Denies fever  Denies chills      Past Surgical History:   Procedure Laterality Date    ERCP N/A 08/10/2022    Procedure: ERCP (ENDOSCOPIC RETROGRADE CHOLANGIOPANCREATOGRAPHY);  Surgeon: Ruddy Jessica III, MD;  Location: [facility];  Service: Endoscopy;  Laterality: N/A;    ERCP N/A 10/11/2022    Procedure: ERCP (ENDOSCOPIC RETROGRADE CHOLANGIOPANCREATOGRAPHY) W/ STENT REMOVAL;  Surgeon: Ruddy Jessica III, MD;  Location: [facility];  Service: Endoscopy;  Laterality: N/A;    LAPAROSCOPIC CHOLECYSTECTOMY N/A 08/08/2022    Procedure: CHOLECYSTECTOMY, LAPAROSCOPIC;  Surgeon: Virgil Mendoza III, MD;  Location: [facility];  Service: General;  Laterality: N/A;    WISDOM TOOTH EXTRACTION  11/15/2019     Family History   Problem Relation Name Age of Onset    Breast cancer Maternal Grandmother      Lymphoma Maternal Grandmother      Cervical cancer Maternal Grandmother      Pulmonary embolism Mother          MTHFR    Glaucoma Neg Hx      Retinal detachment Neg Hx      Macular degeneration Neg Hx      Ovarian cancer Neg Hx        Social History     Socioeconomic History    Marital status:    Tobacco Use    Smoking status: Never    Smokeless tobacco: Never   Substance and Sexual Activity    Alcohol use: Yes    Drug use: No    Sexual activity: Yes     Partners: Male     Social Determinants of Health     Financial Resource Strain: Low Risk  (8/25/2022)    Overall Financial Resource Strain (CARDIA)     Difficulty of Paying Living Expenses: Not hard at all   Food Insecurity: No Food Insecurity (8/25/2022)    Hunger Vital Sign     Worried  About Running Out of Food in the Last Year: Never true     Ran Out of Food in the Last Year: Never true   Transportation Needs: No Transportation Needs (8/25/2022)    PRAPARE - Transportation     Lack of Transportation (Medical): No     Lack of Transportation (Non-Medical): No   Physical Activity: Inactive (8/7/2022)    Exercise Vital Sign     Days of Exercise per Week: 0 days     Minutes of Exercise per Session: 0 min   Stress: No Stress Concern Present (8/25/2022)    [employer] [place] [employer] - Occupational Stress Questionnaire     Feeling of Stress : Not at all   Housing Stability: Unknown (8/25/2022)    Housing Stability Vital Sign     Unable to Pay for Housing in the Last Year: No     Unstable Housing in the Last Year: No     Current Outpatient Medications   Medication Sig Dispense Refill    gabapentin (NEURONTIN) 300 MG capsule Take 1 capsule (300 mg total) by mouth every evening. 30 capsule 5    zolpidem (AMBIEN) 5 MG Tab Take 1 tablet (5 mg total) by mouth nightly as needed (insomnia). 30 tablet 5     No current facility-administered medications for this visit.     Review of patient's allergies indicates:  No Known Allergies   Past Medical History:   Diagnosis Date    Abnormal Pap smear of cervix     repeat pap    Bronchitis     COVID 08/24/2022 1/18/2022    Family history of pulmonary embolism     MOTHER with MTHFR, PE found after knee replacement.     Iron deficiency anemia 05/17/2018    Pancreatitis 08/2022    Pneumonia 08/2022    Sepsis, unspecified organism 08/2022     Past Surgical History:   Procedure Laterality Date    ERCP N/A 08/10/2022    Procedure: ERCP (ENDOSCOPIC RETROGRADE CHOLANGIOPANCREATOGRAPHY);  Surgeon: Ruddy Jessica III, MD;  Location: [facility];  Service: Endoscopy;  Laterality: N/A;    ERCP N/A 10/11/2022    Procedure: ERCP (ENDOSCOPIC RETROGRADE CHOLANGIOPANCREATOGRAPHY) W/ STENT REMOVAL;  Surgeon: Ruddy Jessica III, MD;  Location:  [facility];  Service: Endoscopy;  Laterality: N/A;    LAPAROSCOPIC CHOLECYSTECTOMY N/A 08/08/2022    Procedure: CHOLECYSTECTOMY, LAPAROSCOPIC;  Surgeon: Virgil Mendoza III, MD;  Location: [facility];  Service: General;  Laterality: N/A;    WISDOM TOOTH EXTRACTION  11/15/2019       Review of Systems   All other systems reviewed and are negative.   OBJECTIVE:      Vitals:    06/12/24 1254   BP: 126/82   Pulse: 76   Temp: 98.4 °F (36.9 °C)   SpO2: 97%   Weight: 92 kg (202 lb 14.9 oz)   Height: 5' 7" (1.702 m)     Physical Exam  Vitals and nursing note reviewed.   Constitutional:       Appearance: Normal appearance. She is obese.   HENT:      Head: Normocephalic and atraumatic.   Eyes:      Pupils: Pupils are equal, round, and reactive to light.   Neck:      Comments: No thrills no bruits  No abnormal neck masses felt  Cardiovascular:      Rate and Rhythm: Normal rate and regular rhythm.      Pulses: Normal pulses.      Heart sounds: Normal heart sounds.      Comments: S1 s2 nsr  No edema noted on bilateral lower ext  Pulmonary:      Effort: Pulmonary effort is normal.      Breath sounds: Normal breath sounds.   Musculoskeletal:      Cervical back: Normal range of motion.   Skin:     General: Skin is warm and dry.   Neurological:      General: No focal deficit present.      Mental Status: She is alert and oriented to person, place, and time. Mental status is at baseline.   Psychiatric:         Mood and Affect: Mood normal.         Behavior: Behavior normal.         Thought Content: Thought content normal.         Judgment: Judgment normal.      Comments: nonsuicidal      Assessment:       1. Insomnia, unspecified type    2. Chronic bilateral low back pain without sciatica        Plan:       Insomnia, unspecified type  -     zolpidem (AMBIEN) 5 MG Tab; Take 1 tablet (5 mg total) by mouth nightly as needed (insomnia).  Dispense: 30 tablet; Refill: 5    Chronic bilateral low back pain without sciatica  -     gabapentin " (NEURONTIN) 300 MG capsule; Take 1 capsule (300 mg total) by mouth every evening.  Dispense: 30 capsule; Refill: 5    Take medications   Get pap smear ordered  Consider t dap  Follow up in 6 months or sooner if needed  Get fasting blood work as well    No follow-ups on file.      6/12/2024 BROOKLYN Nunes, FNP

## 2024-06-18 ENCOUNTER — TELEPHONE (OUTPATIENT)
Dept: FAMILY MEDICINE | Facility: CLINIC | Age: 29
End: 2024-06-18
Payer: MEDICAID

## 2024-06-20 ENCOUNTER — PATIENT OUTREACH (OUTPATIENT)
Dept: ADMINISTRATIVE | Facility: HOSPITAL | Age: 29
End: 2024-06-20
Payer: MEDICAID

## 2024-06-20 NOTE — PROGRESS NOTES
Population Health Chart Review & Patient Outreach Details      Additional Pop Health Notes:               Updates Requested / Reviewed:      Updated Care Coordination Note, Care Everywhere, , and External Sources: [facility] and [facility]         Health Maintenance Topics Overdue:      VB Score: 1     Cervical Cancer Screening                       Health Maintenance Topic(s) Outreach Outcomes & Actions Taken:    Cervical Cancer Screening - Outreach Outcomes & Actions Taken  : External Records Uploaded & Care Team Updated if Applicable and msg

## 2024-12-18 ENCOUNTER — PATIENT MESSAGE (OUTPATIENT)
Dept: FAMILY MEDICINE | Facility: CLINIC | Age: 29
End: 2024-12-18
Payer: MEDICAID

## 2024-12-20 ENCOUNTER — OFFICE VISIT (OUTPATIENT)
Dept: FAMILY MEDICINE | Facility: CLINIC | Age: 29
End: 2024-12-20
Payer: MEDICAID

## 2024-12-20 VITALS
TEMPERATURE: 97 F | HEART RATE: 86 BPM | SYSTOLIC BLOOD PRESSURE: 110 MMHG | BODY MASS INDEX: 32.3 KG/M2 | WEIGHT: 205.81 LBS | DIASTOLIC BLOOD PRESSURE: 70 MMHG | OXYGEN SATURATION: 97 % | HEIGHT: 67 IN

## 2024-12-20 DIAGNOSIS — Z80.3 FAMILY HISTORY OF BREAST CANCER: Primary | ICD-10-CM

## 2024-12-20 DIAGNOSIS — G89.29 CHRONIC BILATERAL LOW BACK PAIN WITHOUT SCIATICA: ICD-10-CM

## 2024-12-20 DIAGNOSIS — M54.50 CHRONIC BILATERAL LOW BACK PAIN WITHOUT SCIATICA: ICD-10-CM

## 2024-12-20 DIAGNOSIS — G47.00 INSOMNIA, UNSPECIFIED TYPE: ICD-10-CM

## 2024-12-20 PROCEDURE — 99213 OFFICE O/P EST LOW 20 MIN: CPT | Mod: PBBFAC,PN

## 2024-12-20 PROCEDURE — 99999 PR PBB SHADOW E&M-EST. PATIENT-LVL III: CPT | Mod: PBBFAC,,,

## 2024-12-20 RX ORDER — ZOLPIDEM TARTRATE 5 MG/1
5 TABLET ORAL NIGHTLY PRN
Qty: 30 TABLET | Refills: 5 | Status: SHIPPED | OUTPATIENT
Start: 2024-12-20 | End: 2025-06-20

## 2024-12-20 RX ORDER — GABAPENTIN 300 MG/1
300 CAPSULE ORAL NIGHTLY
Qty: 30 CAPSULE | Refills: 5 | Status: SHIPPED | OUTPATIENT
Start: 2024-12-20 | End: 2025-12-20

## 2024-12-20 NOTE — PROGRESS NOTES
Subjective:       Patient ID: Mena Ovalle is a 29 y.o. female.    Chief Complaint: Medication Refill    Mena Ovalle is a 29-year-old female patient who presents to clinic today for medication refills.  She has a history of insomnia in his doing well with Ambien.  She is sleeping well through the night and able to stay asleep.  History of chronic lower back pain since her 3rd epidural after childbirth.  She is taking gabapentin at night which helps with this.  She is needing refills.  Patient states she has a family history of breast cancer.  Her grandmother had breast cancer and non-Hodgkin's lymphoma in her 60s and also cervical cancer in her 20s.  Her sister recently had a lump in her breast that had to be removed which was ultimately benign.  Patient is wanting a screening mammogram.  She recently had a rash on her breast that was itchy that has resolved.  She is due for her flu vaccine states made her sick in the past.  Also due for Pap smear and sees Dr. Mendoza.  She has lab work ordered previously that has not been done.        Review of patient's allergies indicates:  No Known Allergies  Social Drivers of Health     Tobacco Use: Low Risk  (12/20/2024)    Patient History     Smoking Tobacco Use: Never     Smokeless Tobacco Use: Never     Passive Exposure: Not on file   Alcohol Use: Not At Risk (8/25/2022)    AUDIT-C     Frequency of Alcohol Consumption: Monthly or less     Average Number of Drinks: 1 or 2     Frequency of Binge Drinking: Never   Financial Resource Strain: Low Risk  (8/25/2022)    Overall Financial Resource Strain (CARDIA)     Difficulty of Paying Living Expenses: Not hard at all   Food Insecurity: No Food Insecurity (8/25/2022)    Hunger Vital Sign     Worried About Running Out of Food in the Last Year: Never true     Ran Out of Food in the Last Year: Never true   Transportation Needs: No Transportation Needs (8/25/2022)    PRAPARE - Transportation     Lack of  Transportation (Medical): No     Lack of Transportation (Non-Medical): No   Physical Activity: Inactive (8/7/2022)    Exercise Vital Sign     Days of Exercise per Week: 0 days     Minutes of Exercise per Session: 0 min   Stress: No Stress Concern Present (8/25/2022)    [employer] - Occupational Stress Questionnaire     Feeling of Stress : Not at all   Housing Stability: Unknown (8/25/2022)    Housing Stability Vital Sign     Unable to Pay for Housing in the Last Year: No     Number of Places Lived in the Last Year: Not on file     Unstable Housing in the Last Year: No   Depression: Low Risk  (6/12/2024)    Depression     Last PHQ-4: Flowsheet Data: 0   Utilities: Not on file   Health Literacy: Not on file   Social Isolation: Not on file      Past Medical History:   Diagnosis Date    Abnormal Pap smear of cervix     repeat pap    Bronchitis     COVID 08/24/2022 1/18/2022    Family history of pulmonary embolism     MOTHER with MTHFR, PE found after knee replacement.     Iron deficiency anemia 05/17/2018    Pancreatitis 08/2022    Pneumonia 08/2022    Sepsis, unspecified organism 08/2022      Past Surgical History:   Procedure Laterality Date    ERCP N/A 08/10/2022    Procedure: ERCP (ENDOSCOPIC RETROGRADE CHOLANGIOPANCREATOGRAPHY);  Surgeon: Ruddy Jessica III, MD;  Location: [facility];  Service: Endoscopy;  Laterality: N/A;    ERCP N/A 10/11/2022    Procedure: ERCP (ENDOSCOPIC RETROGRADE CHOLANGIOPANCREATOGRAPHY) W/ STENT REMOVAL;  Surgeon: Ruddy Jessica III, MD;  Location: [facility];  Service: Endoscopy;  Laterality: N/A;    LAPAROSCOPIC CHOLECYSTECTOMY N/A 08/08/2022    Procedure: CHOLECYSTECTOMY, LAPAROSCOPIC;  Surgeon: Virgil Mendoza III, MD;  Location: [facility];  Service: General;  Laterality: N/A;    WISDOM TOOTH EXTRACTION  11/15/2019      Social History     Socioeconomic History    Marital status:          Current Outpatient Medications:     gabapentin  (NEURONTIN) 300 MG capsule, Take 1 capsule (300 mg total) by mouth every evening., Disp: 30 capsule, Rfl: 5    zolpidem (AMBIEN) 5 MG Tab, Take 1 tablet (5 mg total) by mouth nightly as needed (insomnia)., Disp: 30 tablet, Rfl: 5    Lab Results   Component Value Date    WBC 9.09 11/14/2022    HGB 12.8 11/14/2022    HCT 39.8 11/14/2022     11/14/2022    ALT 24 11/14/2022    AST 18 11/14/2022     11/14/2022    K 4.2 11/14/2022     11/14/2022    CREATININE 0.8 11/14/2022    BUN 9 11/14/2022    CO2 23 11/14/2022    TSH 0.803 05/09/2022    INR 1.0 08/08/2022    HGBA1C 5.0 05/09/2022       Review of Systems   Constitutional: Negative.    Respiratory: Negative.     Cardiovascular: Negative.    Gastrointestinal: Negative.    Genitourinary: Negative.    Musculoskeletal:  Positive for back pain.   Integumentary:  Positive for rash.   Psychiatric/Behavioral:  Positive for sleep disturbance.        Objective:      Physical Exam  Vitals reviewed.   Constitutional:       Appearance: Normal appearance.   HENT:      Right Ear: Tympanic membrane normal.      Left Ear: Tympanic membrane normal.      Nose: Nose normal.      Mouth/Throat:      Mouth: Mucous membranes are moist.      Pharynx: Oropharynx is clear.   Eyes:      Conjunctiva/sclera: Conjunctivae normal.      Pupils: Pupils are equal, round, and reactive to light.   Neck:      Thyroid: No thyromegaly.   Cardiovascular:      Rate and Rhythm: Normal rate and regular rhythm.      Pulses: Normal pulses.      Heart sounds: Normal heart sounds.   Pulmonary:      Effort: Pulmonary effort is normal.      Breath sounds: Normal breath sounds.   Lymphadenopathy:      Cervical: No cervical adenopathy.   Skin:     General: Skin is warm and dry.      Capillary Refill: Capillary refill takes less than 2 seconds.   Neurological:      General: No focal deficit present.      Mental Status: She is alert.   Psychiatric:         Mood and Affect: Mood normal.         Behavior:  Behavior normal.         Thought Content: Thought content normal.         Judgment: Judgment normal.         Assessment:       1. Family history of breast cancer    2. Insomnia, unspecified type    3. Chronic bilateral low back pain without sciatica        Plan:       Mena was seen today for medication refill.    Diagnoses and all orders for this visit:    Family history of breast cancer  -     Mammo Digital Screening Bilat w/ [facility]; Future    Insomnia, unspecified type  -     zolpidem (AMBIEN) 5 MG Tab; Take 1 tablet (5 mg total) by mouth nightly as needed (insomnia).    Chronic bilateral low back pain without sciatica  -     gabapentin (NEURONTIN) 300 MG capsule; Take 1 capsule (300 mg total) by mouth every evening.    We will get screening mammogram for baseline evaluation as she is at higher risk for breast cancer.    Insomnia   - Ambien refilled x six-months.   checked    Chronic bilateral lower back pain without sciatica  - gabapentin refilled    Patient declines flu vaccination today.  Recommend scheduling her Pap smear.  Have fasting labs that were previously ordered.  Follow-up in 6 months or sooner if needed.

## 2025-01-07 ENCOUNTER — HOSPITAL ENCOUNTER (OUTPATIENT)
Dept: RADIOLOGY | Facility: HOSPITAL | Age: 30
Discharge: HOME | End: 2025-01-07
Payer: MEDICAID

## 2025-01-07 DIAGNOSIS — Z80.3 FAMILY HISTORY OF BREAST CANCER: ICD-10-CM

## 2025-01-07 PROCEDURE — 77067 SCR MAMMO BI INCL CAD: CPT | Mod: TC

## 2025-03-09 ENCOUNTER — PATIENT MESSAGE (OUTPATIENT)
Dept: FAMILY MEDICINE | Facility: CLINIC | Age: 30
End: 2025-03-09
Payer: COMMERCIAL

## 2025-03-09 DIAGNOSIS — Z86.69 HISTORY OF EAR INFECTIONS: Primary | ICD-10-CM

## (undated) DEVICE — DISSECTOR 5MM ENDOPATH

## (undated) DEVICE — APPLIER CLIP EPIX UNIV 5X34

## (undated) DEVICE — UNDERGLOVE BIOGEL PI SZ 6.5 LF

## (undated) DEVICE — GOWN POLY REINF BRTH SLV XL

## (undated) DEVICE — GLOVE SURG ULTRA TOUCH 7

## (undated) DEVICE — TOWEL OR DISP STRL BLUE 4/PK

## (undated) DEVICE — SLEEVE SCD EXPRESS KNEE MEDIUM

## (undated) DEVICE — BLADE SURG CARBON STEEL SZ11

## (undated) DEVICE — GLOVE SURGEONS ULTRA TOUCH 6.5

## (undated) DEVICE — TROCAR KII FIOS 5MM X 100MM

## (undated) DEVICE — PACK CUSTOM ENDO CHOLO SLI

## (undated) DEVICE — SET TUBE PNEUMOCLEAR SE HI FLO

## (undated) DEVICE — NDL PNEUMO INSUFFLATI 120MM

## (undated) DEVICE — KIT ANTIFOG W/SPONG & FLUID

## (undated) DEVICE — BAG TISS RETRV MONARCH 10MM

## (undated) DEVICE — TROCAR KII FIOS 12MM X 100MM

## (undated) DEVICE — GOWN POLY REINF BRTH SLV LG

## (undated) DEVICE — SCISSOR 5MMX35CM DIRECT DRIVE

## (undated) DEVICE — STRAP OR TABLE 5IN X 72IN

## (undated) DEVICE — SOL WATER STRL IRR 1000ML

## (undated) DEVICE — UNDERGLOVES BIOGEL PI SIZE 8

## (undated) DEVICE — CLOSURE SKIN STERI STRIP 1/2X4

## (undated) DEVICE — SYR 10CC LUER LOCK

## (undated) DEVICE — ELECTRODE REM PLYHSV RETURN 9

## (undated) DEVICE — KIT PROCEDURE STER INLET CLOSU

## (undated) DEVICE — NDL SAFETY 21G X 1 1/2 ECLPSE

## (undated) DEVICE — SUT MONOCRYL 4-0 PS-2

## (undated) DEVICE — GLOVE SURG ULTRA TOUCH 7.5

## (undated) DEVICE — IRRIGATOR SUCTION W/TIP

## (undated) DEVICE — SOL 9P NACL IRR PIC IL

## (undated) DEVICE — APPLICATOR CHLORAPREP ORN 26ML

## (undated) DEVICE — CANNULA LAP SEAL Z THRD 5X100

## (undated) DEVICE — ADHESIVE DERMABOND ADVANCED

## (undated) DEVICE — SYR ONLY LUER LOCK 20CC